# Patient Record
Sex: MALE | Race: WHITE | Employment: FULL TIME | URBAN - METROPOLITAN AREA
[De-identification: names, ages, dates, MRNs, and addresses within clinical notes are randomized per-mention and may not be internally consistent; named-entity substitution may affect disease eponyms.]

---

## 2019-04-13 ENCOUNTER — OFFICE VISIT (OUTPATIENT)
Dept: FAMILY MEDICINE CLINIC | Facility: CLINIC | Age: 49
End: 2019-04-13
Payer: COMMERCIAL

## 2019-04-13 VITALS
HEART RATE: 70 BPM | WEIGHT: 160 LBS | TEMPERATURE: 98 F | HEIGHT: 69.41 IN | SYSTOLIC BLOOD PRESSURE: 118 MMHG | DIASTOLIC BLOOD PRESSURE: 70 MMHG | BODY MASS INDEX: 23.43 KG/M2

## 2019-04-13 DIAGNOSIS — Z12.12 SCREENING FOR COLORECTAL CANCER: ICD-10-CM

## 2019-04-13 DIAGNOSIS — R06.81 APNEA: ICD-10-CM

## 2019-04-13 DIAGNOSIS — Z13.89 SCREENING FOR GENITOURINARY CONDITION: ICD-10-CM

## 2019-04-13 DIAGNOSIS — Z00.00 ROUTINE GENERAL MEDICAL EXAMINATION AT A HEALTH CARE FACILITY: Primary | ICD-10-CM

## 2019-04-13 DIAGNOSIS — R06.83 SNORING: ICD-10-CM

## 2019-04-13 DIAGNOSIS — Z12.11 SCREENING FOR COLORECTAL CANCER: ICD-10-CM

## 2019-04-13 DIAGNOSIS — Z00.00 BLOOD TESTS FOR ROUTINE GENERAL PHYSICAL EXAMINATION: ICD-10-CM

## 2019-04-13 PROCEDURE — 99386 PREV VISIT NEW AGE 40-64: CPT | Performed by: FAMILY MEDICINE

## 2019-04-13 NOTE — PROGRESS NOTES
CHIEF COMPLAINT   Leah Morris is a 57year old male who presents for a complete physical exam.   HPI:   Here for physical.  New patient. Working out twice per week. Snoring with some apnea per wife. Naps at lunch. No hx sleep study.     Wt Reading tender,FROM of the back.   EXTREMITIES: no cyanosis, clubbing or edema  NEURO: Oriented times three,cranial nerves are grossly intact,motor and sensory are grossly intact    ASSESSMENT AND PLAN:   Shadi Arnold is a 57year old male who presents for a comp

## 2019-05-24 ENCOUNTER — TELEPHONE (OUTPATIENT)
Dept: FAMILY MEDICINE CLINIC | Facility: CLINIC | Age: 49
End: 2019-05-24

## 2019-05-24 NOTE — TELEPHONE ENCOUNTER
Pt's spouse came to the office and dropped a form given by his employer in regards to his physical that he completed in April. Pt's spouse states that pt has blood work in the system and he plans on completing this weekend.     She wants to make sure kiana

## 2019-06-06 ENCOUNTER — LABORATORY ENCOUNTER (OUTPATIENT)
Dept: LAB | Age: 49
End: 2019-06-06
Attending: FAMILY MEDICINE
Payer: COMMERCIAL

## 2019-06-06 DIAGNOSIS — Z00.00 BLOOD TESTS FOR ROUTINE GENERAL PHYSICAL EXAMINATION: ICD-10-CM

## 2019-06-06 DIAGNOSIS — E78.00 ELEVATED CHOLESTEROL: ICD-10-CM

## 2019-06-06 DIAGNOSIS — Z13.89 SCREENING FOR GENITOURINARY CONDITION: ICD-10-CM

## 2019-06-06 DIAGNOSIS — D69.1 ABNORMAL PLATELETS (HCC): Primary | ICD-10-CM

## 2019-06-06 PROCEDURE — 81001 URINALYSIS AUTO W/SCOPE: CPT | Performed by: FAMILY MEDICINE

## 2019-06-06 PROCEDURE — 80050 GENERAL HEALTH PANEL: CPT | Performed by: FAMILY MEDICINE

## 2019-06-06 PROCEDURE — 36415 COLL VENOUS BLD VENIPUNCTURE: CPT | Performed by: FAMILY MEDICINE

## 2019-06-06 PROCEDURE — 84153 ASSAY OF PSA TOTAL: CPT | Performed by: FAMILY MEDICINE

## 2019-06-06 PROCEDURE — 87086 URINE CULTURE/COLONY COUNT: CPT | Performed by: FAMILY MEDICINE

## 2019-06-06 PROCEDURE — 80061 LIPID PANEL: CPT | Performed by: FAMILY MEDICINE

## 2019-06-07 ENCOUNTER — TELEPHONE (OUTPATIENT)
Dept: FAMILY MEDICINE CLINIC | Facility: CLINIC | Age: 49
End: 2019-06-07

## 2019-06-07 DIAGNOSIS — R82.90 ABNORMAL URINALYSIS: Primary | ICD-10-CM

## 2019-06-16 ENCOUNTER — HOSPITAL ENCOUNTER (EMERGENCY)
Facility: HOSPITAL | Age: 49
Discharge: HOME | End: 2019-06-16
Attending: EMERGENCY MEDICINE
Payer: COMMERCIAL

## 2019-06-16 VITALS
HEART RATE: 66 BPM | WEIGHT: 170 LBS | DIASTOLIC BLOOD PRESSURE: 82 MMHG | RESPIRATION RATE: 18 BRPM | OXYGEN SATURATION: 97 % | HEIGHT: 70.5 IN | TEMPERATURE: 97 F | SYSTOLIC BLOOD PRESSURE: 121 MMHG | BODY MASS INDEX: 24.07 KG/M2

## 2019-06-16 DIAGNOSIS — S61.212A LACERATION OF RIGHT MIDDLE FINGER WITHOUT FOREIGN BODY WITHOUT DAMAGE TO NAIL, INITIAL ENCOUNTER: Primary | ICD-10-CM

## 2019-06-16 PROCEDURE — 12002 RPR S/N/AX/GEN/TRNK2.6-7.5CM: CPT

## 2019-06-16 PROCEDURE — 99283 EMERGENCY DEPT VISIT LOW MDM: CPT

## 2019-06-16 RX ORDER — AMOXICILLIN AND CLAVULANATE POTASSIUM 875; 125 MG/1; MG/1
1 TABLET, FILM COATED ORAL 2 TIMES DAILY
Qty: 20 TABLET | Refills: 0 | Status: SHIPPED | OUTPATIENT
Start: 2019-06-16 | End: 2019-06-26

## 2019-06-16 NOTE — ED PROVIDER NOTES
Patient Seen in: [facility] Emergency Department    History   Patient presents with:  Laceration Abrasion (integumentary)    Stated Complaint: lac    HPI    Patient is a 62-year-old right-hand-dominant male presenting to the emergency department due that is superficial along the dorsum of the right middle finger no involvement of the nail plate, joints or tendons. There is a slow venous ooze, that is fairly difficult to stop bleeding. No pulsatile bleeding. No foreign body or contamination.   No mas 875-125 MG Oral Tab  Take 1 tablet by mouth 2 (two) times daily for 10 days. , Print Script, Disp-20 tablet, R-0

## 2019-06-28 ENCOUNTER — OFFICE VISIT (OUTPATIENT)
Dept: SLEEP CENTER | Facility: HOSPITAL | Age: 49
End: 2019-06-28
Attending: FAMILY MEDICINE
Payer: COMMERCIAL

## 2019-06-28 DIAGNOSIS — R06.83 SNORING: ICD-10-CM

## 2019-06-28 DIAGNOSIS — R06.81 APNEA: ICD-10-CM

## 2019-06-28 PROCEDURE — 95810 POLYSOM 6/> YRS 4/> PARAM: CPT

## 2019-07-02 NOTE — PROCEDURES
[address]       Accredited by the [facility] ([facility])    PATIENT'S NAME:        Shalini Worley  ATTENDING PHYSICIAN:   Sari Delgado M.D.   REFERRING PHYSICIAN:   Tana Bello awakenings. Sleep-disordered breathing was seen during the study; 41 apneas and hypopneas were tallied. The overall AHI was 6.5 events per hour of sleep. There was a strong supine dominance.   The supine index was 11.8 compared to a non-supine index of for your confidence in the [facility]. If you have any questions, please feel free to contact us at [phone].     Dictated By Aimee Higgins M.D.  d: 07/02/2019 09:41:48  t: 07/02/2019 09:48:26  Job [id number]  NF/    cc: Familia Kumar

## 2019-08-21 ENCOUNTER — MED REC SCAN ONLY (OUTPATIENT)
Dept: FAMILY MEDICINE CLINIC | Facility: CLINIC | Age: 49
End: 2019-08-21

## 2019-10-30 ENCOUNTER — MED REC SCAN ONLY (OUTPATIENT)
Dept: FAMILY MEDICINE CLINIC | Facility: CLINIC | Age: 49
End: 2019-10-30

## 2020-03-20 ENCOUNTER — OFFICE VISIT (OUTPATIENT)
Dept: FAMILY MEDICINE CLINIC | Facility: CLINIC | Age: 50
End: 2020-03-20
Payer: COMMERCIAL

## 2020-03-20 VITALS
DIASTOLIC BLOOD PRESSURE: 80 MMHG | SYSTOLIC BLOOD PRESSURE: 130 MMHG | RESPIRATION RATE: 12 BRPM | BODY MASS INDEX: 23.5 KG/M2 | WEIGHT: 166 LBS | HEIGHT: 70.5 IN | HEART RATE: 72 BPM | TEMPERATURE: 97 F

## 2020-03-20 DIAGNOSIS — G89.29 CHRONIC BILATERAL LOW BACK PAIN WITH BILATERAL SCIATICA: Primary | ICD-10-CM

## 2020-03-20 DIAGNOSIS — M54.41 CHRONIC BILATERAL LOW BACK PAIN WITH BILATERAL SCIATICA: Primary | ICD-10-CM

## 2020-03-20 DIAGNOSIS — M54.42 CHRONIC BILATERAL LOW BACK PAIN WITH BILATERAL SCIATICA: Primary | ICD-10-CM

## 2020-03-20 PROCEDURE — 99214 OFFICE O/P EST MOD 30 MIN: CPT | Performed by: FAMILY MEDICINE

## 2020-03-20 RX ORDER — MELOXICAM 15 MG/1
15 TABLET ORAL DAILY
Qty: 90 TABLET | Refills: 0 | Status: ON HOLD | OUTPATIENT
Start: 2020-03-20 | End: 2020-06-03

## 2020-03-20 NOTE — PROGRESS NOTES
Leah Morris is a 54year old male. CHIEF COMPLAINT   Low back pain  HPI:   About this time last year, was carrying something heavy and felt pain and pop in back. Lasted for awhile and then slowly got better.   Since then has had consistent pain in both (primary encounter diagnosis)    Meds & Refills for this Visit:  Requested Prescriptions     Signed Prescriptions Disp Refills   • Meloxicam 15 MG Oral Tab 90 tablet 0     Sig: Take 1 tablet (15 mg total) by mouth daily.        Imaging & Consults:  MRI SPIN

## 2020-03-25 ENCOUNTER — TELEPHONE (OUTPATIENT)
Dept: FAMILY MEDICINE CLINIC | Facility: CLINIC | Age: 50
End: 2020-03-25

## 2020-03-25 NOTE — TELEPHONE ENCOUNTER
Shanda Holloway  P Emg 11 Front Office             Good afternoon! According to [facility]:     All information pertaining to your request was received and reviewed by a Medical Director.      Reason:   Based on [employer] [facility] Guidelines Secti

## 2020-03-26 NOTE — TELEPHONE ENCOUNTER
MRI being denied. He had PT but because it was more than 3 months ago, it appears insurance won't approve. Please call [facility] PT to see if they are even doing PT right now.   If they aren't doing PT currently due to Covid, I could try him on a prescriptio

## 2020-03-27 NOTE — TELEPHONE ENCOUNTER
Sandie from [place] transferred to me on this. Reports pt called them to inquire and they did not even show record of the MRI order? ?  I advised the rep to contact our referral dept as they are the ones that process any authorizations and offered to s/w pt d

## 2020-03-27 NOTE — TELEPHONE ENCOUNTER
If he takes the meloxicam for 6 weeks, then calls us back with an update (appears it can be by phone rather than an in person visit) and isn't better, I think that we could get insurance to approve.   If they have a high deductible plan, it may be better to

## 2020-03-27 NOTE — TELEPHONE ENCOUNTER
Informed pt's wife, of new instructions, voices understanding. Sophie,pt's wife states insurance will be calling to speak with you about a peer to peer review with Willy.   She wanted you to be prepared to speak with them and mention maybe therapeutic

## 2020-03-27 NOTE — TELEPHONE ENCOUNTER
I am out of the office until Wednesday. Just FYI in the event that they try to schedule a peer to peer before that.

## 2020-03-30 NOTE — TELEPHONE ENCOUNTER
Spoke with Amanda Kitchen at [facility]. [id number] with Dr. Dhiraj Mccoy is set for Wednesday, Apirl 1 at 8:30 am with Adrianna Segura. Dr. Juancarlos London will call our office and ask for [place].

## 2020-03-30 NOTE — TELEPHONE ENCOUNTER
Noted. Copy to lena/PSR/referrals and updated dr stack/on call. He suggests we call shlomo and schedule peer to peer with donte PLATT for 4/1/wednesday, when she returns to office. Rubio Bowens updated-will contact shlomo to schedule.

## 2020-04-01 NOTE — TELEPHONE ENCOUNTER
Called and spoke to Dr. Chani Rivers. He advised that needs to have 6 weeks of provider directed conservative therapy  - anti inflammatory would meet this requirement after which time the patient will need to call us with an update on his symptoms.   If will with

## 2020-04-01 NOTE — TELEPHONE ENCOUNTER
Call to pt's cell reaches identified voice mail. Left vmm req call back to triage nurse tomorrow for outcome of donte's peer to peer review w  from his [employer] today. Provided ofc phone hours.

## 2020-04-03 NOTE — TELEPHONE ENCOUNTER
Call to pt-advised of donte PLATT info below from peer to peer review. Pt confirms started meloxicam 3/20/20 so will call update 5/1-will call sooner is any new/worsening symptoms.   Updates that since starting meloxicam 3/20/2020 for short time after dose

## 2020-05-04 ENCOUNTER — PATIENT MESSAGE (OUTPATIENT)
Dept: FAMILY MEDICINE CLINIC | Facility: CLINIC | Age: 50
End: 2020-05-04

## 2020-05-04 NOTE — TELEPHONE ENCOUNTER
Please send the patient contact information for Dr. Sharri Barillas (pain management) and Dr. Megan Bernstein (physiatry - physical medicine/rehab). I'm not sure which of these are currently seeing new patients during Covid but he can call to check.

## 2020-06-01 ENCOUNTER — APPOINTMENT (OUTPATIENT)
Dept: MRI IMAGING | Facility: HOSPITAL | Age: 50
End: 2020-06-01
Attending: EMERGENCY MEDICINE
Payer: COMMERCIAL

## 2020-06-01 ENCOUNTER — HOSPITAL ENCOUNTER (OUTPATIENT)
Facility: HOSPITAL | Age: 50
Setting detail: OBSERVATION
Discharge: HOME | End: 2020-06-03
Attending: EMERGENCY MEDICINE | Admitting: INTERNAL MEDICINE
Payer: COMMERCIAL

## 2020-06-01 DIAGNOSIS — M54.16 LUMBAR RADICULITIS: ICD-10-CM

## 2020-06-01 DIAGNOSIS — M54.16 LUMBAR RADICULOPATHY: Primary | ICD-10-CM

## 2020-06-01 PROBLEM — M51.16 LUMBAR DISC HERNIATION WITH RADICULOPATHY: Status: ACTIVE | Noted: 2020-06-01

## 2020-06-01 PROCEDURE — 72148 MRI LUMBAR SPINE W/O DYE: CPT | Performed by: EMERGENCY MEDICINE

## 2020-06-01 PROCEDURE — 99220 INITIAL OBSERVATION CARE,LEVL III: CPT | Performed by: INTERNAL MEDICINE

## 2020-06-01 RX ORDER — ACETAMINOPHEN 325 MG/1
650 TABLET ORAL EVERY 4 HOURS PRN
Status: DISCONTINUED | OUTPATIENT
Start: 2020-06-01 | End: 2020-06-02

## 2020-06-01 RX ORDER — ONDANSETRON 2 MG/ML
4 INJECTION INTRAMUSCULAR; INTRAVENOUS EVERY 6 HOURS PRN
Status: DISCONTINUED | OUTPATIENT
Start: 2020-06-01 | End: 2020-06-02

## 2020-06-01 RX ORDER — HYDROCODONE BITARTRATE AND ACETAMINOPHEN 5; 325 MG/1; MG/1
1 TABLET ORAL EVERY 4 HOURS PRN
Status: DISCONTINUED | OUTPATIENT
Start: 2020-06-01 | End: 2020-06-02

## 2020-06-01 RX ORDER — DEXAMETHASONE SODIUM PHOSPHATE 4 MG/ML
4 VIAL (ML) INJECTION EVERY 6 HOURS
Status: DISCONTINUED | OUTPATIENT
Start: 2020-06-02 | End: 2020-06-03

## 2020-06-01 RX ORDER — HYDROMORPHONE HYDROCHLORIDE 1 MG/ML
0.8 INJECTION, SOLUTION INTRAMUSCULAR; INTRAVENOUS; SUBCUTANEOUS EVERY 2 HOUR PRN
Status: DISCONTINUED | OUTPATIENT
Start: 2020-06-01 | End: 2020-06-02

## 2020-06-01 RX ORDER — ONDANSETRON 2 MG/ML
4 INJECTION INTRAMUSCULAR; INTRAVENOUS ONCE
Status: COMPLETED | OUTPATIENT
Start: 2020-06-01 | End: 2020-06-01

## 2020-06-01 RX ORDER — SODIUM CHLORIDE 9 MG/ML
INJECTION, SOLUTION INTRAVENOUS CONTINUOUS
Status: DISCONTINUED | OUTPATIENT
Start: 2020-06-02 | End: 2020-06-03

## 2020-06-01 RX ORDER — HYDROMORPHONE HYDROCHLORIDE 1 MG/ML
0.4 INJECTION, SOLUTION INTRAMUSCULAR; INTRAVENOUS; SUBCUTANEOUS EVERY 2 HOUR PRN
Status: DISCONTINUED | OUTPATIENT
Start: 2020-06-01 | End: 2020-06-02

## 2020-06-01 RX ORDER — MORPHINE SULFATE 4 MG/ML
4 INJECTION, SOLUTION INTRAMUSCULAR; INTRAVENOUS EVERY 30 MIN PRN
Status: DISCONTINUED | OUTPATIENT
Start: 2020-06-01 | End: 2020-06-02

## 2020-06-01 RX ORDER — HYDROMORPHONE HYDROCHLORIDE 1 MG/ML
1 INJECTION, SOLUTION INTRAMUSCULAR; INTRAVENOUS; SUBCUTANEOUS EVERY 30 MIN PRN
Status: DISCONTINUED | OUTPATIENT
Start: 2020-06-01 | End: 2020-06-01

## 2020-06-01 RX ORDER — HYDROCODONE BITARTRATE AND ACETAMINOPHEN 5; 325 MG/1; MG/1
2 TABLET ORAL EVERY 4 HOURS PRN
Status: DISCONTINUED | OUTPATIENT
Start: 2020-06-01 | End: 2020-06-02

## 2020-06-01 RX ORDER — HYDROMORPHONE HYDROCHLORIDE 1 MG/ML
0.2 INJECTION, SOLUTION INTRAMUSCULAR; INTRAVENOUS; SUBCUTANEOUS EVERY 2 HOUR PRN
Status: DISCONTINUED | OUTPATIENT
Start: 2020-06-01 | End: 2020-06-02

## 2020-06-01 NOTE — ED PROVIDER NOTES
Patient Seen in: [facility] Emergency Department      History   Patient presents with:  Back Pain    Stated Complaint: back pain    HPI    this is a pleasant 53-year-old male coming complaints of back pain.   Patient says he was getting up from a sea palpation skin was slightly diaphoretic no focal deficits on neurologic exam.       ED Course     Labs Reviewed   CBC W/ DIFFERENTIAL - Abnormal; Notable for the following components:       Result Value    WBC 16.5 (*)     .0 (*)     Neutrophil Abso

## 2020-06-01 NOTE — ED INITIAL ASSESSMENT (HPI)
Pt states has chronic back pain and sciatica x months states he squatted today and felt a \"pop\"lower left back and felt pain and numbness down to leg, awaiting approval for MRI

## 2020-06-02 ENCOUNTER — TELEPHONE (OUTPATIENT)
Dept: SURGERY | Facility: CLINIC | Age: 50
End: 2020-06-02

## 2020-06-02 PROCEDURE — 99243 OFF/OP CNSLTJ NEW/EST LOW 30: CPT | Performed by: ANESTHESIOLOGY

## 2020-06-02 PROCEDURE — 99225 SUBSEQUENT OBSERVATION CARE: CPT | Performed by: HOSPITALIST

## 2020-06-02 RX ORDER — NAPROXEN 250 MG/1
250 TABLET ORAL 2 TIMES DAILY WITH MEALS
Status: ON HOLD | COMMUNITY
End: 2020-06-03

## 2020-06-02 RX ORDER — MORPHINE SULFATE 4 MG/ML
2 INJECTION, SOLUTION INTRAMUSCULAR; INTRAVENOUS EVERY 2 HOUR PRN
Status: DISCONTINUED | OUTPATIENT
Start: 2020-06-02 | End: 2020-06-02

## 2020-06-02 RX ORDER — HYDROMORPHONE HYDROCHLORIDE 1 MG/ML
0.5 INJECTION, SOLUTION INTRAMUSCULAR; INTRAVENOUS; SUBCUTANEOUS EVERY 2 HOUR PRN
Status: DISCONTINUED | OUTPATIENT
Start: 2020-06-02 | End: 2020-06-02

## 2020-06-02 RX ORDER — HYDROMORPHONE HYDROCHLORIDE 1 MG/ML
1 INJECTION, SOLUTION INTRAMUSCULAR; INTRAVENOUS; SUBCUTANEOUS EVERY 2 HOUR PRN
Status: DISCONTINUED | OUTPATIENT
Start: 2020-06-02 | End: 2020-06-02

## 2020-06-02 RX ORDER — IBUPROFEN 200 MG
400 TABLET ORAL EVERY 6 HOURS PRN
Status: ON HOLD | COMMUNITY
End: 2020-06-03

## 2020-06-02 RX ORDER — GABAPENTIN 300 MG/1
300 CAPSULE ORAL 3 TIMES DAILY
Status: DISCONTINUED | OUTPATIENT
Start: 2020-06-02 | End: 2020-06-03

## 2020-06-02 RX ORDER — KETOROLAC TROMETHAMINE 30 MG/ML
30 INJECTION, SOLUTION INTRAMUSCULAR; INTRAVENOUS EVERY 6 HOURS PRN
Status: DISCONTINUED | OUTPATIENT
Start: 2020-06-02 | End: 2020-06-02

## 2020-06-02 RX ORDER — HYDROCODONE BITARTRATE AND ACETAMINOPHEN 10; 325 MG/1; MG/1
1 TABLET ORAL EVERY 4 HOURS PRN
Status: DISCONTINUED | OUTPATIENT
Start: 2020-06-02 | End: 2020-06-03

## 2020-06-02 RX ORDER — DOCUSATE SODIUM 100 MG/1
100 CAPSULE, LIQUID FILLED ORAL 2 TIMES DAILY
Status: DISCONTINUED | OUTPATIENT
Start: 2020-06-02 | End: 2020-06-03

## 2020-06-02 RX ORDER — MORPHINE SULFATE 4 MG/ML
4 INJECTION, SOLUTION INTRAMUSCULAR; INTRAVENOUS EVERY 2 HOUR PRN
Status: DISCONTINUED | OUTPATIENT
Start: 2020-06-02 | End: 2020-06-02

## 2020-06-02 RX ORDER — ONDANSETRON 2 MG/ML
4 INJECTION INTRAMUSCULAR; INTRAVENOUS EVERY 4 HOURS PRN
Status: DISCONTINUED | OUTPATIENT
Start: 2020-06-02 | End: 2020-06-03

## 2020-06-02 RX ORDER — MORPHINE SULFATE 4 MG/ML
1 INJECTION, SOLUTION INTRAMUSCULAR; INTRAVENOUS EVERY 2 HOUR PRN
Status: DISCONTINUED | OUTPATIENT
Start: 2020-06-02 | End: 2020-06-03

## 2020-06-02 RX ORDER — ACETAMINOPHEN 325 MG/1
325 TABLET ORAL EVERY 6 HOURS PRN
Status: ON HOLD | COMMUNITY
End: 2020-06-03

## 2020-06-02 RX ORDER — MORPHINE SULFATE 4 MG/ML
2 INJECTION, SOLUTION INTRAMUSCULAR; INTRAVENOUS EVERY 2 HOUR PRN
Status: DISCONTINUED | OUTPATIENT
Start: 2020-06-02 | End: 2020-06-03

## 2020-06-02 RX ORDER — CYCLOBENZAPRINE HCL 5 MG
5 TABLET ORAL 3 TIMES DAILY
Status: DISCONTINUED | OUTPATIENT
Start: 2020-06-02 | End: 2020-06-03

## 2020-06-02 RX ORDER — HYDROCODONE BITARTRATE AND ACETAMINOPHEN 10; 325 MG/1; MG/1
2 TABLET ORAL EVERY 4 HOURS PRN
Status: DISCONTINUED | OUTPATIENT
Start: 2020-06-02 | End: 2020-06-03

## 2020-06-02 RX ORDER — KETOROLAC TROMETHAMINE 15 MG/ML
15 INJECTION, SOLUTION INTRAMUSCULAR; INTRAVENOUS EVERY 6 HOURS PRN
Status: DISCONTINUED | OUTPATIENT
Start: 2020-06-02 | End: 2020-06-02

## 2020-06-02 RX ORDER — MORPHINE SULFATE 4 MG/ML
4 INJECTION, SOLUTION INTRAMUSCULAR; INTRAVENOUS EVERY 2 HOUR PRN
Status: DISCONTINUED | OUTPATIENT
Start: 2020-06-02 | End: 2020-06-03

## 2020-06-02 RX ORDER — HYDROMORPHONE HYDROCHLORIDE 1 MG/ML
0.8 INJECTION, SOLUTION INTRAMUSCULAR; INTRAVENOUS; SUBCUTANEOUS EVERY 2 HOUR PRN
Status: DISCONTINUED | OUTPATIENT
Start: 2020-06-02 | End: 2020-06-02

## 2020-06-02 RX ORDER — MORPHINE SULFATE 4 MG/ML
1 INJECTION, SOLUTION INTRAMUSCULAR; INTRAVENOUS EVERY 2 HOUR PRN
Status: DISCONTINUED | OUTPATIENT
Start: 2020-06-02 | End: 2020-06-02

## 2020-06-02 NOTE — PHYSICAL THERAPY NOTE
PT order received for an eval, however pt has significant MRI results and awaiting ortho sx consult. Will assess when medically cleared for activity.

## 2020-06-02 NOTE — PLAN OF CARE
Pt is A&O x4. VSS and afebrile. Sinus rhythm on tele, rate in 70s. O2 sats WNL on room air, lung sounds clear bilaterally.  maintained. C/o left lower back pain rated about 5-6 on 0 to 10 scale. Providing pain meds PRN per [facility] with good relief.  Reports pain and request assistance  - Assess pain using appropriate pain scale  - Administer analgesics based on type and severity of pain and evaluate response  - Implement non-pharmacological measures as appropriate and evaluate response  - Consider cultural an

## 2020-06-02 NOTE — PLAN OF CARE
NURSING ADMISSION NOTE      Patient admitted via Cart  Oriented to room. Safety precautions initiated. Bed in low position. Call light in reach. Pt arrived to unit in stable condition from ED at 2345. A&O x4. VSS and afebrile.  Unable to [place]

## 2020-06-02 NOTE — CONSULTS
Name: Kenney Jiang   : 1970   DOS: 2020     Chief complaint: Low back pain    History of present illness: Kenney Jiang is a 54year old male with a history of low back pain, admitted for acute lumbar radiculopathy.   The patient was wor The patient ambulates with normal gait. HEENT: No gross lesion noted. PEERL. No icterus. Neck and Upper Extremity: Supple. No thyromegaly or lymphadenopathy. Severe tenderness over the cervical paravertebral and trapezius muscles.  Flexion of the neck pauline left lumbar 4–5 transforaminal epidural steroid injection. The risk and benefits were discussed in detail and all questions addressed. Patient will be added on to Dr. Lauralyn Spatz schedule in the afternoon tomorrow.   He can follow-up as an outpatient after epi

## 2020-06-02 NOTE — PROGRESS NOTES
[facility] H[facility]PITAL[facility]  Progress Note     Lorejuma Lazoquincy Patient Status:  Observation    1970 MRN [id number]   [facility] 3NW-A Attending Sarah Hernandez MD   Hosp Day # 0 PCP Pankaj Garcia MD     Chief Complaint: Back pain    S: Pa reviewed in Epic.     Medications:   • gabapentin  300 mg Oral TID   • cyclobenzaprine  5 mg Oral TID   • docusate sodium  100 mg Oral BID   • lidocaine-menthol  1 patch Transdermal Daily   • dexamethasone Sodium Phosphate  4 mg Intravenous Q6H       ASSESS

## 2020-06-02 NOTE — PROGRESS NOTES
Pain Service  Spoke with RN  Notified [facility] regarding consult for left L4 SNRB   Patient received Toradol at [facility] 5 am  Deric Quiroz RN

## 2020-06-02 NOTE — CONSULTS
Patient: Stephan Griffith  Medical Record Number: [id number]  Referring Physician:  No ref.  provider found  PCP: Azar Vega MD      HISTORY OF CHIEF COMPLAINT:    Stephan Griffith is a 54year old male, who comes to ED for worsening back and lef A comprehensive 10 point review of systems was completed. Pertinent positives and negatives noted in the the HPI.      PHYSICAL EXAMIMATION   PHYSICAL EXAMINATION: Shaquille Flores is a 54year old male who is observed sitting in the exam room alert and or MEDICAL DECISION MAKING:     Plan:   [unfilled]      Stephan Nacho is a 54year old with L4 radiculopathy with elements of L5 and S1 also.  L4 hes most significant p

## 2020-06-02 NOTE — H&P (VIEW-ONLY)
Name: Imelda Borrego   : 1970   DOS: 2020     Chief complaint: Low back pain    History of present illness: Imelda Borrego is a 54year old male with a history of low back pain, admitted for acute lumbar radiculopathy.   The patient was wor The patient ambulates with normal gait. HEENT: No gross lesion noted. PEERL. No icterus. Neck and Upper Extremity: Supple. No thyromegaly or lymphadenopathy. Severe tenderness over the cervical paravertebral and trapezius muscles.  Flexion of the neck pauline left lumbar 4–5 transforaminal epidural steroid injection. The risk and benefits were discussed in detail and all questions addressed. Patient will be added on to Dr. Kaylan Almonte schedule in the afternoon tomorrow.   He can follow-up as an outpatient after epi

## 2020-06-02 NOTE — ED NOTES
Attempted to walk patient per MD request. Pt able to get up and Elwyn Guitar slowly to door of room. Pt stated with every step  Pain became worse. Returned back to bed. MD Soha Naranjo notified.

## 2020-06-02 NOTE — H&P
[facility]                                                               History & Physical         Gali Armas Patient Status:  Emergency    1970 MRN [id number]   Location [facility] Attending Tricia Lin lymphadenopathy. No JVD. No carotid bruits. Respiratory: Clear to auscultation bilaterally. No wheezes. No rhonchi. Cardiovascular: S1, S2.  Regular rate and rhythm. No murmurs. Equal pulses   Abdomen: Soft, nontender, nondistended.   Positive bowel so normal 12 mm. L5-S1:  Minimal degenerative disc bulge without significant central or foraminal stenosis. The facet joints unremarkable. PARASPINAL AREA:  Normal with no visible mass.     BONY STRUCTURES:  No fracture, pars defect, significant scolios

## 2020-06-02 NOTE — PLAN OF CARE
Patient is alert and oriented x3  Up to dangle only. Patient is not able to ambulate to bathroom d/t back pain. Uses urinal at bedside.  Neuro checks WNL  Voids in urinal  Patient reports he is not having spikes in pain like he was at admission but still ha Notify MD/LIP if interventions unsuccessful or patient reports new pain  - Anticipate increased pain with activity and pre-medicate as appropriate  Outcome: Progressing

## 2020-06-03 ENCOUNTER — TELEPHONE (OUTPATIENT)
Dept: PAIN CLINIC | Facility: CLINIC | Age: 50
End: 2020-06-03

## 2020-06-03 ENCOUNTER — APPOINTMENT (OUTPATIENT)
Dept: GENERAL RADIOLOGY | Facility: HOSPITAL | Age: 50
End: 2020-06-03
Attending: ANESTHESIOLOGY
Payer: COMMERCIAL

## 2020-06-03 VITALS
RESPIRATION RATE: 15 BRPM | TEMPERATURE: 98 F | DIASTOLIC BLOOD PRESSURE: 90 MMHG | SYSTOLIC BLOOD PRESSURE: 115 MMHG | OXYGEN SATURATION: 98 % | HEART RATE: 85 BPM | BODY MASS INDEX: 22.9 KG/M2 | WEIGHT: 160 LBS | HEIGHT: 70 IN

## 2020-06-03 DIAGNOSIS — M54.16 LUMBAR RADICULITIS: Primary | ICD-10-CM

## 2020-06-03 PROCEDURE — 99217 OBSERVATION CARE DISCHARGE: CPT | Performed by: HOSPITALIST

## 2020-06-03 PROCEDURE — 3E0R3BZ INTRODUCTION OF ANESTHETIC AGENT INTO SPINAL CANAL, PERCUTANEOUS APPROACH: ICD-10-PCS | Performed by: ANESTHESIOLOGY

## 2020-06-03 PROCEDURE — 3E0R33Z INTRODUCTION OF ANTI-INFLAMMATORY INTO SPINAL CANAL, PERCUTANEOUS APPROACH: ICD-10-PCS | Performed by: ANESTHESIOLOGY

## 2020-06-03 RX ORDER — ONDANSETRON 2 MG/ML
4 INJECTION INTRAMUSCULAR; INTRAVENOUS ONCE AS NEEDED
Status: DISCONTINUED | OUTPATIENT
Start: 2020-06-03 | End: 2020-06-03

## 2020-06-03 RX ORDER — SODIUM CHLORIDE, SODIUM LACTATE, POTASSIUM CHLORIDE, CALCIUM CHLORIDE 600; 310; 30; 20 MG/100ML; MG/100ML; MG/100ML; MG/100ML
100 INJECTION, SOLUTION INTRAVENOUS CONTINUOUS
Status: DISCONTINUED | OUTPATIENT
Start: 2020-06-03 | End: 2020-06-03

## 2020-06-03 RX ORDER — ONDANSETRON 2 MG/ML
4 INJECTION INTRAMUSCULAR; INTRAVENOUS ONCE AS NEEDED
Status: DISCONTINUED | OUTPATIENT
Start: 2020-06-03 | End: 2020-06-03 | Stop reason: HOSPADM

## 2020-06-03 RX ORDER — HYDROCODONE BITARTRATE AND ACETAMINOPHEN 10; 325 MG/1; MG/1
1 TABLET ORAL EVERY 4 HOURS PRN
Qty: 30 TABLET | Refills: 0 | Status: SHIPPED | OUTPATIENT
Start: 2020-06-03 | End: 2020-08-27 | Stop reason: ALTCHOICE

## 2020-06-03 RX ORDER — METHYLPREDNISOLONE 4 MG/1
TABLET ORAL
Qty: 21 TABLET | Refills: 0 | Status: SHIPPED | OUTPATIENT
Start: 2020-06-03 | End: 2020-06-15 | Stop reason: ALTCHOICE

## 2020-06-03 RX ORDER — DEXAMETHASONE SODIUM PHOSPHATE 10 MG/ML
INJECTION, SOLUTION INTRAMUSCULAR; INTRAVENOUS AS NEEDED
Status: DISCONTINUED | OUTPATIENT
Start: 2020-06-03 | End: 2020-06-03 | Stop reason: HOSPADM

## 2020-06-03 RX ORDER — LIDOCAINE HYDROCHLORIDE 10 MG/ML
INJECTION, SOLUTION EPIDURAL; INFILTRATION; INTRACAUDAL; PERINEURAL AS NEEDED
Status: DISCONTINUED | OUTPATIENT
Start: 2020-06-03 | End: 2020-06-03 | Stop reason: HOSPADM

## 2020-06-03 RX ORDER — PSEUDOEPHEDRINE HCL 30 MG
100 TABLET ORAL 2 TIMES DAILY
Qty: 60 CAPSULE | Refills: 0 | Status: SHIPPED | OUTPATIENT
Start: 2020-06-03 | End: 2020-08-27 | Stop reason: ALTCHOICE

## 2020-06-03 RX ORDER — GABAPENTIN 300 MG/1
300 CAPSULE ORAL 3 TIMES DAILY
Qty: 90 CAPSULE | Refills: 0 | Status: SHIPPED | OUTPATIENT
Start: 2020-06-03 | End: 2020-07-10

## 2020-06-03 RX ORDER — DIPHENHYDRAMINE HYDROCHLORIDE 50 MG/ML
50 INJECTION INTRAMUSCULAR; INTRAVENOUS ONCE AS NEEDED
Status: DISCONTINUED | OUTPATIENT
Start: 2020-06-03 | End: 2020-06-03

## 2020-06-03 RX ORDER — CYCLOBENZAPRINE HCL 5 MG
5 TABLET ORAL 3 TIMES DAILY PRN
Qty: 30 TABLET | Refills: 0 | Status: SHIPPED | OUTPATIENT
Start: 2020-06-03 | End: 2020-06-15 | Stop reason: ALTCHOICE

## 2020-06-03 NOTE — INTERVAL H&P NOTE
Pre-op Diagnosis: Lumbar radiculitis [M54.16]    The above referenced H&P was reviewed by El Jones MD on 6/3/2020, the patient was examined and no significant changes have occurred in the patient's condition since the H&P was performed.   I discusse

## 2020-06-03 NOTE — PLAN OF CARE
Patient states his pain is mild when he is in bed and resting ,pain is severe when he try to stand on his feet ,most of the pain is in the left leg ,also has numbness and tingling to left leg from calf down to feet and toes ,pain is controlled with norco s

## 2020-06-03 NOTE — PROGRESS NOTES
Chart reviewed  Per spine note, request for selective L4 nerve root block only to delineate symptoms L5 vs S1. I was present for discussion between pain service and patient yesterday.     Initially nerve root block discussed by pain service, but patient

## 2020-06-03 NOTE — DISCHARGE SUMMARY
[facility]  DISCHARGE SUMMARY     Alton Bautista Patient Status:  Observation    1970 MRN [id number]   [facility] 3NW-A Attending No att. providers found   Hosp Day # 0 PCP Muna Salcedo MD     Date of Admission:  Recommendation:  LACE < 29: Low Risk of readmission after discharge from the hospital; Still recommend for TCM follow-up. Procedures during hospitalization:   Left L4 selective nerve root block via L4-L5 transforaminal approach under fluoroscopy.     Dis Ptch  · methylPREDNISolone 4 MG Tbpk         [place]    Follow-up appointment:   Tim Kessler MD   [facility] [id number]    In 2 weeks  As directed    Marshall Parks MD  [facility] [address]

## 2020-06-03 NOTE — OPERATIVE REPORT
[facility]  Operative Report  6/3/2020     Kentrell Bell Patient Status:  Observation    1970 MRN [id number]   [facility] 3NW-A Attending Wade Whitlock MD   Hosp Day # 0 PCP Petra Suárez MD     Indication: Vineet Bullockon is a 4 fluoroscopic guidance. The needle was advanced into the anterior epidural space at this level. The needle position was confirmed under AP and lateral fluoroscopic view.   Following negative aspiration for CSF and blood, approximately 1 cc of Omnipaque 240 w

## 2020-06-03 NOTE — PHYSICAL THERAPY NOTE
PHYSICAL THERAPY QUICK EVALUATION - INPATIENT    Room Number: 303/303-A  Evaluation Date: 6/3/2020  Presenting Problem: (lumbar radiculopathy )  Physician Order: PT Eval and Treat    Problem List  Principal Problem:    Lumbar radiculopathy  Active Proble O2 WALK                  AM-PAC '6-Clicks' INPATIENT SHORT FORM - BASIC MOBILITY  How much difficulty does the patient currently have. ..  -   Turning over in bed (including adjusting bedclothes, sheets and blankets)?: None   -   Sitting down on and low level flossing to help to mobilize sciatic nerve . Recommended OP PT for continued strength and endurance, core stab . Patient End of Session: Up in chair;Needs met;Call light within reach;RN aware of session/findings;Bracing education provided; All p

## 2020-06-08 ENCOUNTER — OFFICE VISIT (OUTPATIENT)
Dept: FAMILY MEDICINE CLINIC | Facility: CLINIC | Age: 50
End: 2020-06-08
Payer: COMMERCIAL

## 2020-06-08 VITALS
HEART RATE: 72 BPM | DIASTOLIC BLOOD PRESSURE: 60 MMHG | BODY MASS INDEX: 23.77 KG/M2 | WEIGHT: 166 LBS | HEIGHT: 70 IN | TEMPERATURE: 98 F | RESPIRATION RATE: 18 BRPM | SYSTOLIC BLOOD PRESSURE: 96 MMHG

## 2020-06-08 DIAGNOSIS — M51.26 LUMBAR DISC HERNIATION: Primary | ICD-10-CM

## 2020-06-08 DIAGNOSIS — R20.0 LEFT LEG NUMBNESS: ICD-10-CM

## 2020-06-08 DIAGNOSIS — R29.898 LEFT LEG WEAKNESS: ICD-10-CM

## 2020-06-08 PROBLEM — J30.9 ALLERGIC RHINITIS: Status: ACTIVE | Noted: 2020-06-08

## 2020-06-08 PROCEDURE — 99214 OFFICE O/P EST MOD 30 MIN: CPT | Performed by: FAMILY MEDICINE

## 2020-06-08 PROCEDURE — 1111F DSCHRG MED/CURRENT MED MERGE: CPT | Performed by: FAMILY MEDICINE

## 2020-06-08 NOTE — PROGRESS NOTES
Ronda Kramer is a 54year old male. CHIEF COMPLAINT   Follow up lumbar disc herniation  HPI:   Admitted for about 36 hours for low back pain/left leg weakness- had injection in the hospital.  Pain level 1-2 in left lower lateral leg.   Worse with walki RRR without murmur  NEURO:  DTRs to bilateral lowe ext normal.  Patient unable to walk on left heel. Left sided weakness with dorsiflexion of foot. Also some weakness with knee flexion.       MRI report:  PATIENT STATED HISTORY: (As transcribed by Shayne Roche

## 2020-06-25 ENCOUNTER — TELEPHONE (OUTPATIENT)
Dept: PAIN CLINIC | Facility: CLINIC | Age: 50
End: 2020-06-25

## 2020-06-25 ENCOUNTER — OFFICE VISIT (OUTPATIENT)
Dept: PAIN CLINIC | Facility: CLINIC | Age: 50
End: 2020-06-25
Payer: COMMERCIAL

## 2020-06-25 VITALS — WEIGHT: 160 LBS | HEIGHT: 70 IN | BODY MASS INDEX: 22.9 KG/M2

## 2020-06-25 DIAGNOSIS — M54.16 LUMBAR RADICULAR PAIN: Primary | ICD-10-CM

## 2020-06-25 PROCEDURE — 99214 OFFICE O/P EST MOD 30 MIN: CPT | Performed by: ANESTHESIOLOGY

## 2020-06-25 NOTE — TELEPHONE ENCOUNTER
Medical clearance needed- no  Implanted cardiac device/SCS/PNS: n/a    Pt seen in OV today by Dr. Yasmin Chicas and recommended for LESI (X 1) with Dr. Eve Stahl. Please begin PA process for procedure(s). Laterality: n/a  Level(s): n/a    Pt informed callback will be given when PA feedback received and procedure date to be scheduled after approval.  All procedural instructions reviewed, procedure line number provided. Pt verbalized understanding and agreeable to plan. Copy of instructions provided.

## 2020-06-25 NOTE — PROGRESS NOTES
Name: Alka Castillo   : 1970   DOS: 2020     Pain Clinic Follow Up Visit:   Alka Castillo is a 54year old male who presents for recheck of his low back pain and left lower extremity pain.   He is status post left L4 selective nerve root compression, Yeoman's and Gaenslen's tests are positive bilaterally. Flexion of the spine makes the pain better, extension and lateral rotation of the spine makes the pain worse.  Straight leg raising test is positive at 15 degrees on the left side and nega

## 2020-06-25 NOTE — PROGRESS NOTES
Last procedure: LEFT L4 SELECTIVE NERVE ROOT BLOCK VIA L4-L5 TRANSFORAMINAL APPROACH UNDER FLUOROSCOPY WITHOUT SEDATION.   Date: 06/03/2020    Percentage of relief obtained: 70%    Duration of relief: patient still at 70%     Patient presents in office toda

## 2020-06-30 NOTE — TELEPHONE ENCOUNTER
BREANA ([id number]) Denied   Case Number: [id number]     Denial Reason:    Reason: Based on [facility] Comprehensive Musculoskeletal Management Guideline  - Epidural Steroid Injections (DIPIKA) (used for pain that travels from your spine to your arms or legs), we cannot approve this request.   Your records do not show that you have failed a trial of at least 6 weeks of treatment given by your doctor. This treatment may include: exercise, therapy, chiropractic care, and/or drugs for swelling and/or muscle relaxers. The requested service is not supported prior to a trial of treatment for at least 6 weeks. Your physician may contact [facility] at [phone] to discuss this case with a physician advisor.  This call may be made prior to the submission of an appeal.

## 2020-07-02 NOTE — TELEPHONE ENCOUNTER
Peer-Peer scheduled for Monday 7/6 at 9:30am with Dr. Surinder Hadley at \[facility]\"".

## 2020-07-06 NOTE — TELEPHONE ENCOUNTER
Pt calling to schedule injections, please call back, pt states \"this needs to be expedited and he will be out of town 07/12-07/23\"

## 2020-07-07 ENCOUNTER — TELEPHONE (OUTPATIENT)
Dept: PAIN CLINIC | Facility: CLINIC | Age: 50
End: 2020-07-07

## 2020-07-07 DIAGNOSIS — M54.16 LUMBAR RADICULITIS: Primary | ICD-10-CM

## 2020-07-07 NOTE — TELEPHONE ENCOUNTER
Peer to Peer Authorized     Prior authorization request completed for: Brittney Lockhart #[id number]     Authorization dates: 07/06/20 - 09/04/20  CPT codes approved: 62133  Number of visits/dates of service approved: 1  Physician: Javy Whitehead     Patient can be scheduled

## 2020-07-07 NOTE — TELEPHONE ENCOUNTER
Dr Thania Isabel completed peer to peer on 7/6/2020.      Alka Sanchez MD  You 1 hour ago (8:39 AM)      Approved    Routing comment

## 2020-07-09 ENCOUNTER — APPOINTMENT (OUTPATIENT)
Dept: GENERAL RADIOLOGY | Facility: HOSPITAL | Age: 50
End: 2020-07-09
Attending: ANESTHESIOLOGY
Payer: COMMERCIAL

## 2020-07-09 ENCOUNTER — HOSPITAL ENCOUNTER (OUTPATIENT)
Facility: HOSPITAL | Age: 50
Setting detail: HOSPITAL OUTPATIENT SURGERY
Discharge: HOME | End: 2020-07-09
Attending: ANESTHESIOLOGY | Admitting: ANESTHESIOLOGY
Payer: COMMERCIAL

## 2020-07-09 VITALS
RESPIRATION RATE: 18 BRPM | SYSTOLIC BLOOD PRESSURE: 116 MMHG | DIASTOLIC BLOOD PRESSURE: 80 MMHG | TEMPERATURE: 97 F | HEART RATE: 61 BPM | OXYGEN SATURATION: 99 %

## 2020-07-09 DIAGNOSIS — M54.16 LUMBAR RADICULITIS: ICD-10-CM

## 2020-07-09 PROCEDURE — 3E0R33Z INTRODUCTION OF ANTI-INFLAMMATORY INTO SPINAL CANAL, PERCUTANEOUS APPROACH: ICD-10-PCS | Performed by: ANESTHESIOLOGY

## 2020-07-09 PROCEDURE — B01B1ZZ FLUOROSCOPY OF SPINAL CORD USING LOW OSMOLAR CONTRAST: ICD-10-PCS | Performed by: ANESTHESIOLOGY

## 2020-07-09 PROCEDURE — 3E0R3BZ INTRODUCTION OF ANESTHETIC AGENT INTO SPINAL CANAL, PERCUTANEOUS APPROACH: ICD-10-PCS | Performed by: ANESTHESIOLOGY

## 2020-07-09 RX ORDER — IBUPROFEN 400 MG/1
400 TABLET ORAL EVERY 6 HOURS PRN
Status: ON HOLD | COMMUNITY
End: 2020-09-18

## 2020-07-09 RX ORDER — ONDANSETRON 2 MG/ML
4 INJECTION INTRAMUSCULAR; INTRAVENOUS ONCE AS NEEDED
Status: DISCONTINUED | OUTPATIENT
Start: 2020-07-09 | End: 2020-07-09

## 2020-07-09 RX ORDER — DEXAMETHASONE SODIUM PHOSPHATE 10 MG/ML
INJECTION, SOLUTION INTRAMUSCULAR; INTRAVENOUS AS NEEDED
Status: DISCONTINUED | OUTPATIENT
Start: 2020-07-09 | End: 2020-07-09

## 2020-07-09 RX ORDER — SODIUM CHLORIDE, SODIUM LACTATE, POTASSIUM CHLORIDE, CALCIUM CHLORIDE 600; 310; 30; 20 MG/100ML; MG/100ML; MG/100ML; MG/100ML
100 INJECTION, SOLUTION INTRAVENOUS CONTINUOUS
Status: DISCONTINUED | OUTPATIENT
Start: 2020-07-09 | End: 2020-07-09

## 2020-07-09 RX ORDER — 0.9 % SODIUM CHLORIDE 0.9 %
VIAL (ML) INJECTION AS NEEDED
Status: DISCONTINUED | OUTPATIENT
Start: 2020-07-09 | End: 2020-07-09

## 2020-07-09 RX ORDER — LIDOCAINE HYDROCHLORIDE 10 MG/ML
INJECTION, SOLUTION EPIDURAL; INFILTRATION; INTRACAUDAL; PERINEURAL AS NEEDED
Status: DISCONTINUED | OUTPATIENT
Start: 2020-07-09 | End: 2020-07-09

## 2020-07-09 RX ORDER — DIPHENHYDRAMINE HYDROCHLORIDE 50 MG/ML
50 INJECTION INTRAMUSCULAR; INTRAVENOUS ONCE AS NEEDED
Status: DISCONTINUED | OUTPATIENT
Start: 2020-07-09 | End: 2020-07-09

## 2020-07-09 NOTE — H&P
History & Physical Examination    Patient Name: Nohemy Calvo  MRN: [id number]  CSN: [id number]  YOB: 1970    Pre-Operative Diagnosis:  Lumbar radiculitis [M54.16]    Present Illness: Patient with lumbar radiculopathy for epidural steroid i with plan of care. [ x ] I have reviewed the History and Physical done within the last 30 days. Any changes noted above.     Mike Ramon MD

## 2020-07-09 NOTE — OPERATIVE REPORT
[facility]  Operative Report  2020     Charu Carlson Patient Status:  Hospital Outpatient Surgery    1970 MRN [id number]   [facility] ENDOSCOPY Attending Dwain Carrillo MD   Hosp Day # 0 PCP MD Sulema Justice complication. The patient's back was cleaned and sterile dressing was applied. The patient was discharged with an instruction to a responsible adult after discharge criteria were met. The patient was advised to contact us should any problems happen.   Bernett Scheuermann

## 2020-07-10 ENCOUNTER — PATIENT MESSAGE (OUTPATIENT)
Dept: FAMILY MEDICINE CLINIC | Facility: CLINIC | Age: 50
End: 2020-07-10

## 2020-07-10 RX ORDER — GABAPENTIN 300 MG/1
300 CAPSULE ORAL 3 TIMES DAILY
Qty: 90 CAPSULE | Refills: 0 | Status: SHIPPED | OUTPATIENT
Start: 2020-07-10 | End: 2020-08-27 | Stop reason: ALTCHOICE

## 2020-07-10 NOTE — TELEPHONE ENCOUNTER
From: Louise Mccord  To: Geovanni Davis PA-C  Sent: 7/10/2020 8:35 AM CDT  Subject: Prescription Question    While I've been seeing some improvements since the herniated disk, I'm still having a lot of shooting pain, and burning sensations in my lo

## 2020-08-05 ENCOUNTER — HOSPITAL ENCOUNTER (OUTPATIENT)
Dept: GENERAL RADIOLOGY | Facility: HOSPITAL | Age: 50
Discharge: HOME | End: 2020-08-05
Attending: NEUROLOGICAL SURGERY
Payer: COMMERCIAL

## 2020-08-05 ENCOUNTER — OFFICE VISIT (OUTPATIENT)
Dept: SURGERY | Facility: CLINIC | Age: 50
End: 2020-08-05
Payer: COMMERCIAL

## 2020-08-05 VITALS
HEART RATE: 77 BPM | HEIGHT: 70.5 IN | WEIGHT: 160 LBS | SYSTOLIC BLOOD PRESSURE: 132 MMHG | DIASTOLIC BLOOD PRESSURE: 80 MMHG | BODY MASS INDEX: 22.65 KG/M2

## 2020-08-05 DIAGNOSIS — M51.16 LUMBAR DISC HERNIATION WITH RADICULOPATHY: Primary | ICD-10-CM

## 2020-08-05 DIAGNOSIS — M51.16 LUMBAR DISC HERNIATION WITH RADICULOPATHY: ICD-10-CM

## 2020-08-05 DIAGNOSIS — M54.59 MECHANICAL LOW BACK PAIN: ICD-10-CM

## 2020-08-05 PROCEDURE — 3079F DIAST BP 80-89 MM HG: CPT | Performed by: NEUROLOGICAL SURGERY

## 2020-08-05 PROCEDURE — 72114 X-RAY EXAM L-S SPINE BENDING: CPT | Performed by: NEUROLOGICAL SURGERY

## 2020-08-05 PROCEDURE — 3075F SYST BP GE 130 - 139MM HG: CPT | Performed by: NEUROLOGICAL SURGERY

## 2020-08-05 PROCEDURE — 3008F BODY MASS INDEX DOCD: CPT | Performed by: NEUROLOGICAL SURGERY

## 2020-08-05 PROCEDURE — 99204 OFFICE O/P NEW MOD 45 MIN: CPT | Performed by: NEUROLOGICAL SURGERY

## 2020-08-05 NOTE — PROGRESS NOTES
Pt is established with pain management. Location of Pain: pain in hip joint region radiating down to toes. Pt states herniated disk L4-L5 and L5-S1 slight bulge from month and a half ago.  Pt states he had injections for that which helped a little but d

## 2020-08-05 NOTE — PROGRESS NOTES
[facility]  Neurological Surgery Clinic Note    Name: Leroy Elena  : 1970  MRN: [id number]  PCP: Eris Cote MD    CHIEF COMPLAINT:  Back and bilateral leg pain.   (back pain = leg pain)    HISTORY OF PRESENT ILLNESS: Jonathan Khalil MD at [facility] 9    splenectomy   • TRANSFORAMINAL LUMBAR EPIDURAL STEROID INJECTION SINGLE LEVEL Left 6/3/2020    Performed by Gela Acevedo MD at [facility] ENDOSCOPY       FAMILY HISTORY:  Family history is un bilaterally. EOMI. Visual fields are full. Face is symmetrical. Tongue is midline. Uvula and palate elevate symmetrically. Shrug shoulders normally bilaterally. The rest of the cranial nerves are grossly intact.     Sensation to light touch is intact up after the x-rays to discuss treatment options (decompression vs ILIF vs TLIF)    Thank you very much for allowing me to participate in the care of Luis Manuel Masterson.     Trace Garay MD, [facility]  Neurological Surgeon  [address]

## 2020-08-14 ENCOUNTER — TELEPHONE (OUTPATIENT)
Dept: SURGERY | Facility: CLINIC | Age: 50
End: 2020-08-14

## 2020-08-14 DIAGNOSIS — M51.16 DISPLACEMENT OF LUMBAR DISC WITH RADICULOPATHY: Primary | ICD-10-CM

## 2020-08-17 NOTE — TELEPHONE ENCOUNTER
Per last office visit note on 08/05, patient to f/u after xrays completed to discuss treatment options (decompression vs ILIF vs TLIF).   Please call patient for a f/u visit for surgical discussion, thank you

## 2020-08-18 ENCOUNTER — MED REC SCAN ONLY (OUTPATIENT)
Dept: FAMILY MEDICINE CLINIC | Facility: CLINIC | Age: 50
End: 2020-08-18

## 2020-08-18 NOTE — TELEPHONE ENCOUNTER
Patient needs to schedule an appointment with Osman Riddle for a surgical discussion. Message sent to  to arrange surgical discussion appointment.

## 2020-08-26 ENCOUNTER — TELEPHONE (OUTPATIENT)
Dept: SURGERY | Facility: CLINIC | Age: 50
End: 2020-08-26

## 2020-08-26 DIAGNOSIS — M51.16 DISPLACEMENT OF LUMBAR DISC WITH RADICULOPATHY: Primary | ICD-10-CM

## 2020-08-26 NOTE — TELEPHONE ENCOUNTER
You are scheduled for left lumbar four five far lateral discectomy on 9/17/20 with . Pre-op instructions discussed with patient and surgical packet provided:    · You will need to contact the Pre-admission department at [phone].  You will resume the medication before you are discharged from the hospital  · Post operative appointment to be made 2 weeks after surgery. Spine class is available online at [email or web address].  Please call the Care Coordinator, Petrona Witt, with any

## 2020-08-26 NOTE — TELEPHONE ENCOUNTER
Called patient due to Dr. Yuni Ibrahim doing emergency surgery today to see if we could get appointment for surgical discussion rescheduled. Patient indicated he doesn't need another appointment it has already been discussed with him what should happen.   He ju

## 2020-08-27 ENCOUNTER — TELEPHONE (OUTPATIENT)
Dept: FAMILY MEDICINE CLINIC | Facility: CLINIC | Age: 50
End: 2020-08-27

## 2020-08-27 DIAGNOSIS — Z01.818 PRE-OP TESTING: Primary | ICD-10-CM

## 2020-08-27 RX ORDER — ACETAMINOPHEN 325 MG/1
1000 TABLET ORAL ONCE
Status: ON HOLD | COMMUNITY
End: 2020-09-18

## 2020-08-27 RX ORDER — NAPROXEN SODIUM 220 MG
TABLET ORAL
Status: ON HOLD | COMMUNITY
End: 2020-09-18

## 2020-08-27 NOTE — TELEPHONE ENCOUNTER
Spoke with Bhumi Shepard from [facility] who is requesting clarification regarding need to have \"T and S\" lab completed. Informed Bhumi Shepard that providers typically do not require lumbar surgery patients to have that specific lab completed unless the PCP requests it.   D

## 2020-08-27 NOTE — TELEPHONE ENCOUNTER
Received request for an H&P, CBC, CMP, Type and Screen, PT/PTT/INR, EKG, CXR, MRSA, MSSA Nasal Swab, COVID-19 testing. PSR;s to schedule pt and advise that orders will be available to schedule tomorrow morning.     Called Neuro, Dr Crystal Hargrove office, spoke

## 2020-08-27 NOTE — TELEPHONE ENCOUNTER
Phone call opened in error - note previously from MA indicating that patient needs preoperative clearance per notification from neurosurgery.

## 2020-09-03 ENCOUNTER — HOSPITAL ENCOUNTER (OUTPATIENT)
Dept: GENERAL RADIOLOGY | Facility: HOSPITAL | Age: 50
Discharge: HOME | End: 2020-09-03
Attending: FAMILY MEDICINE
Payer: COMMERCIAL

## 2020-09-03 DIAGNOSIS — Z01.818 PRE-OP TESTING: ICD-10-CM

## 2020-09-03 PROCEDURE — 71046 X-RAY EXAM CHEST 2 VIEWS: CPT | Performed by: FAMILY MEDICINE

## 2020-09-08 ENCOUNTER — LAB ENCOUNTER (OUTPATIENT)
Dept: LAB | Facility: HOSPITAL | Age: 50
End: 2020-09-08
Attending: FAMILY MEDICINE
Payer: COMMERCIAL

## 2020-09-08 ENCOUNTER — OFFICE VISIT (OUTPATIENT)
Dept: FAMILY MEDICINE CLINIC | Facility: CLINIC | Age: 50
End: 2020-09-08
Payer: COMMERCIAL

## 2020-09-08 ENCOUNTER — HOSPITAL ENCOUNTER (OUTPATIENT)
Dept: CV DIAGNOSTICS | Facility: HOSPITAL | Age: 50
End: 2020-09-08
Attending: FAMILY MEDICINE
Payer: COMMERCIAL

## 2020-09-08 VITALS
TEMPERATURE: 99 F | OXYGEN SATURATION: 97 % | RESPIRATION RATE: 16 BRPM | WEIGHT: 163 LBS | SYSTOLIC BLOOD PRESSURE: 118 MMHG | HEART RATE: 60 BPM | HEIGHT: 70.5 IN | BODY MASS INDEX: 23.08 KG/M2 | DIASTOLIC BLOOD PRESSURE: 74 MMHG

## 2020-09-08 DIAGNOSIS — Z01.818 PRE-OP TESTING: ICD-10-CM

## 2020-09-08 DIAGNOSIS — M51.16 LUMBAR DISC HERNIATION WITH RADICULOPATHY: Primary | ICD-10-CM

## 2020-09-08 DIAGNOSIS — Z01.818 PRE-OPERATIVE CLEARANCE: ICD-10-CM

## 2020-09-08 PROBLEM — G47.30 SLEEP APNEA IN ADULT: Status: ACTIVE | Noted: 2020-09-08

## 2020-09-08 LAB
ALBUMIN SERPL-MCNC: 3.8 G/DL (ref 3.4–5)
ALBUMIN/GLOB SERPL: 1 {RATIO} (ref 1–2)
ALP LIVER SERPL-CCNC: 56 U/L (ref 45–117)
ALT SERPL-CCNC: 25 U/L (ref 16–61)
ANION GAP SERPL CALC-SCNC: 2 MMOL/L (ref 0–18)
APTT PPP: 27.6 SECONDS (ref 25.4–36.1)
AST SERPL-CCNC: 16 U/L (ref 15–37)
ATRIAL RATE: 57 BPM
BASOPHILS # BLD AUTO: 0.06 X10(3) UL (ref 0–0.2)
BASOPHILS NFR BLD AUTO: 0.9 %
BILIRUB SERPL-MCNC: 0.7 MG/DL (ref 0.1–2)
BUN BLD-MCNC: 10 MG/DL (ref 7–18)
BUN/CREAT SERPL: 10.8 (ref 10–20)
CALCIUM BLD-MCNC: 9 MG/DL (ref 8.5–10.1)
CHLORIDE SERPL-SCNC: 106 MMOL/L (ref 98–112)
CO2 SERPL-SCNC: 30 MMOL/L (ref 21–32)
CREAT BLD-MCNC: 0.93 MG/DL (ref 0.7–1.3)
DEPRECATED RDW RBC AUTO: 44.7 FL (ref 35.1–46.3)
EOSINOPHIL # BLD AUTO: 0.05 X10(3) UL (ref 0–0.7)
EOSINOPHIL NFR BLD AUTO: 0.7 %
ERYTHROCYTE [DISTWIDTH] IN BLOOD BY AUTOMATED COUNT: 13.5 % (ref 11–15)
GLOBULIN PLAS-MCNC: 3.7 G/DL (ref 2.8–4.4)
GLUCOSE BLD-MCNC: 99 MG/DL (ref 70–99)
HCT VFR BLD AUTO: 46.1 % (ref 39–53)
HGB BLD-MCNC: 14.9 G/DL (ref 13–17.5)
IMM GRANULOCYTES # BLD AUTO: 0.01 X10(3) UL (ref 0–1)
IMM GRANULOCYTES NFR BLD: 0.1 %
INR BLD: 0.98 (ref 0.89–1.11)
LYMPHOCYTES # BLD AUTO: 1.44 X10(3) UL (ref 1–4)
LYMPHOCYTES NFR BLD AUTO: 21.4 %
M PROTEIN MFR SERPL ELPH: 7.5 G/DL (ref 6.4–8.2)
MCH RBC QN AUTO: 29 PG (ref 26–34)
MCHC RBC AUTO-ENTMCNC: 32.3 G/DL (ref 31–37)
MCV RBC AUTO: 89.9 FL (ref 80–100)
MONOCYTES # BLD AUTO: 0.38 X10(3) UL (ref 0.1–1)
MONOCYTES NFR BLD AUTO: 5.6 %
NEUTROPHILS # BLD AUTO: 4.79 X10 (3) UL (ref 1.5–7.7)
NEUTROPHILS # BLD AUTO: 4.79 X10(3) UL (ref 1.5–7.7)
NEUTROPHILS NFR BLD AUTO: 71.3 %
OSMOLALITY SERPL CALC.SUM OF ELEC: 285 MOSM/KG (ref 275–295)
P AXIS: 62 DEGREES
P-R INTERVAL: 138 MS
PATIENT FASTING Y/N/NP: YES
PLATELET # BLD AUTO: 443 10(3)UL (ref 150–450)
POTASSIUM SERPL-SCNC: 4.3 MMOL/L (ref 3.5–5.1)
PSA SERPL DL<=0.01 NG/ML-MCNC: 13.3 SECONDS (ref 12.4–14.6)
Q-T INTERVAL: 384 MS
QRS DURATION: 80 MS
QTC CALCULATION (BEZET): 373 MS
R AXIS: 71 DEGREES
RBC # BLD AUTO: 5.13 X10(6)UL (ref 4.3–5.7)
SODIUM SERPL-SCNC: 138 MMOL/L (ref 136–145)
T AXIS: 61 DEGREES
VENTRICULAR RATE: 57 BPM
WBC # BLD AUTO: 6.7 X10(3) UL (ref 4–11)

## 2020-09-08 PROCEDURE — 36415 COLL VENOUS BLD VENIPUNCTURE: CPT

## 2020-09-08 PROCEDURE — 93010 ELECTROCARDIOGRAM REPORT: CPT | Performed by: INTERNAL MEDICINE

## 2020-09-08 PROCEDURE — 85025 COMPLETE CBC W/AUTO DIFF WBC: CPT

## 2020-09-08 PROCEDURE — 87081 CULTURE SCREEN ONLY: CPT

## 2020-09-08 PROCEDURE — 80053 COMPREHEN METABOLIC PANEL: CPT

## 2020-09-08 PROCEDURE — 3074F SYST BP LT 130 MM HG: CPT | Performed by: FAMILY MEDICINE

## 2020-09-08 PROCEDURE — 85610 PROTHROMBIN TIME: CPT

## 2020-09-08 PROCEDURE — 3008F BODY MASS INDEX DOCD: CPT | Performed by: FAMILY MEDICINE

## 2020-09-08 PROCEDURE — 85730 THROMBOPLASTIN TIME PARTIAL: CPT

## 2020-09-08 PROCEDURE — 3078F DIAST BP <80 MM HG: CPT | Performed by: FAMILY MEDICINE

## 2020-09-08 PROCEDURE — 93005 ELECTROCARDIOGRAM TRACING: CPT

## 2020-09-08 PROCEDURE — 99242 OFF/OP CONSLTJ NEW/EST SF 20: CPT | Performed by: FAMILY MEDICINE

## 2020-09-08 NOTE — H&P
Kari Villatoro is a 54year old male who presents for a pre-operative physical exam. Patient is to have left lumbar 4/5 far lateral discectomy to be done by Dr. Ana Maria Ayala at [facility] on 9/17/20. HPI:   Pt complains of chronic low back pain.   Started abo currently - no medication  HEMATOLOGIC: denies hx of anemia  ENDOCRINE: denies thyroid history  ALL/ASTHMA: denies hx of asthma  Has oral appliance for sleep apnea.    EXAM:   /74 (BP Location: Left arm, Patient Position: Sitting, Cuff Size: adult)

## 2020-09-08 NOTE — TELEPHONE ENCOUNTER
Per PCP \"Patient is in satisfactory condition and acceptable risk for planned surgery and anesthesia\"

## 2020-09-08 NOTE — PATIENT INSTRUCTIONS
12:00 today - outpatient testing at [facility]. Park in [place]. Enter through big doors and look for outpatient testing just past the volunteer desk.
yes

## 2020-09-09 NOTE — TELEPHONE ENCOUNTER
Prior authorization request completed for:  left lumbar four five far lateral discectomy  Authorization #[id number]  Authorization dates: 9/17/20  CPT codes approved: 21112  Number of visits/dates of service approved: o outpatient  Physician: Dr Flores Pock

## 2020-09-14 ENCOUNTER — APPOINTMENT (OUTPATIENT)
Dept: LAB | Facility: HOSPITAL | Age: 50
End: 2020-09-14
Attending: NEUROLOGICAL SURGERY
Payer: COMMERCIAL

## 2020-09-14 DIAGNOSIS — M54.16 LUMBAR RADICULOPATHY, ACUTE: ICD-10-CM

## 2020-09-16 LAB — SARS-COV-2 RNA RESP QL NAA+PROBE: NOT DETECTED

## 2020-09-17 ENCOUNTER — APPOINTMENT (OUTPATIENT)
Dept: GENERAL RADIOLOGY | Facility: HOSPITAL | Age: 50
End: 2020-09-17
Attending: NEUROLOGICAL SURGERY
Payer: COMMERCIAL

## 2020-09-17 ENCOUNTER — ANESTHESIA EVENT (OUTPATIENT)
Dept: SURGERY | Facility: HOSPITAL | Age: 50
End: 2020-09-17
Payer: COMMERCIAL

## 2020-09-17 ENCOUNTER — HOSPITAL ENCOUNTER (OUTPATIENT)
Facility: HOSPITAL | Age: 50
Discharge: HOME | End: 2020-09-18
Attending: NEUROLOGICAL SURGERY | Admitting: NEUROLOGICAL SURGERY
Payer: COMMERCIAL

## 2020-09-17 ENCOUNTER — ANESTHESIA (OUTPATIENT)
Dept: SURGERY | Facility: HOSPITAL | Age: 50
End: 2020-09-17
Payer: COMMERCIAL

## 2020-09-17 DIAGNOSIS — M51.16 DISPLACEMENT OF LUMBAR DISC WITH RADICULOPATHY: ICD-10-CM

## 2020-09-17 DIAGNOSIS — M54.16 LUMBAR RADICULOPATHY, ACUTE: Primary | ICD-10-CM

## 2020-09-17 PROCEDURE — 99213 OFFICE O/P EST LOW 20 MIN: CPT | Performed by: INTERNAL MEDICINE

## 2020-09-17 PROCEDURE — 76000 FLUOROSCOPY <1 HR PHYS/QHP: CPT | Performed by: NEUROLOGICAL SURGERY

## 2020-09-17 PROCEDURE — 3078F DIAST BP <80 MM HG: CPT | Performed by: INTERNAL MEDICINE

## 2020-09-17 PROCEDURE — 3008F BODY MASS INDEX DOCD: CPT | Performed by: INTERNAL MEDICINE

## 2020-09-17 PROCEDURE — 3074F SYST BP LT 130 MM HG: CPT | Performed by: INTERNAL MEDICINE

## 2020-09-17 PROCEDURE — 0SB20ZZ EXCISION OF LUMBAR VERTEBRAL DISC, OPEN APPROACH: ICD-10-PCS | Performed by: NEUROLOGICAL SURGERY

## 2020-09-17 RX ORDER — GLYCOPYRROLATE 0.2 MG/ML
INJECTION, SOLUTION INTRAMUSCULAR; INTRAVENOUS AS NEEDED
Status: DISCONTINUED | OUTPATIENT
Start: 2020-09-17 | End: 2020-09-17 | Stop reason: SURG

## 2020-09-17 RX ORDER — DOCUSATE SODIUM 100 MG/1
100 CAPSULE, LIQUID FILLED ORAL 2 TIMES DAILY
Status: DISCONTINUED | OUTPATIENT
Start: 2020-09-17 | End: 2020-09-18

## 2020-09-17 RX ORDER — CEFAZOLIN SODIUM/WATER 2 G/20 ML
2 SYRINGE (ML) INTRAVENOUS EVERY 8 HOURS
Status: DISCONTINUED | OUTPATIENT
Start: 2020-09-17 | End: 2020-09-18

## 2020-09-17 RX ORDER — HYDROCODONE BITARTRATE AND ACETAMINOPHEN 5; 325 MG/1; MG/1
2 TABLET ORAL AS NEEDED
Status: COMPLETED | OUTPATIENT
Start: 2020-09-17 | End: 2020-09-17

## 2020-09-17 RX ORDER — SODIUM PHOSPHATE, DIBASIC AND SODIUM PHOSPHATE, MONOBASIC 7; 19 G/133ML; G/133ML
1 ENEMA RECTAL ONCE AS NEEDED
Status: DISCONTINUED | OUTPATIENT
Start: 2020-09-17 | End: 2020-09-18

## 2020-09-17 RX ORDER — KETOROLAC TROMETHAMINE 30 MG/ML
30 INJECTION, SOLUTION INTRAMUSCULAR; INTRAVENOUS EVERY 6 HOURS
Status: DISCONTINUED | OUTPATIENT
Start: 2020-09-17 | End: 2020-09-18

## 2020-09-17 RX ORDER — CEFAZOLIN SODIUM/WATER 2 G/20 ML
SYRINGE (ML) INTRAVENOUS
Status: DISPENSED
Start: 2020-09-17 | End: 2020-09-17

## 2020-09-17 RX ORDER — NALOXONE HYDROCHLORIDE 0.4 MG/ML
80 INJECTION, SOLUTION INTRAMUSCULAR; INTRAVENOUS; SUBCUTANEOUS AS NEEDED
Status: DISCONTINUED | OUTPATIENT
Start: 2020-09-17 | End: 2020-09-17 | Stop reason: HOSPADM

## 2020-09-17 RX ORDER — METOCLOPRAMIDE HYDROCHLORIDE 5 MG/ML
10 INJECTION INTRAMUSCULAR; INTRAVENOUS EVERY 6 HOURS PRN
Status: DISCONTINUED | OUTPATIENT
Start: 2020-09-17 | End: 2020-09-18

## 2020-09-17 RX ORDER — SODIUM CHLORIDE, SODIUM LACTATE, POTASSIUM CHLORIDE, CALCIUM CHLORIDE 600; 310; 30; 20 MG/100ML; MG/100ML; MG/100ML; MG/100ML
INJECTION, SOLUTION INTRAVENOUS CONTINUOUS
Status: DISCONTINUED | OUTPATIENT
Start: 2020-09-17 | End: 2020-09-17 | Stop reason: HOSPADM

## 2020-09-17 RX ORDER — HYDROMORPHONE HYDROCHLORIDE 1 MG/ML
0.4 INJECTION, SOLUTION INTRAMUSCULAR; INTRAVENOUS; SUBCUTANEOUS EVERY 5 MIN PRN
Status: DISCONTINUED | OUTPATIENT
Start: 2020-09-17 | End: 2020-09-17 | Stop reason: HOSPADM

## 2020-09-17 RX ORDER — HYDROCODONE BITARTRATE AND ACETAMINOPHEN 5; 325 MG/1; MG/1
TABLET ORAL
Status: COMPLETED
Start: 2020-09-17 | End: 2020-09-17

## 2020-09-17 RX ORDER — SENNOSIDES 8.6 MG
17.2 TABLET ORAL NIGHTLY
Status: DISCONTINUED | OUTPATIENT
Start: 2020-09-17 | End: 2020-09-18

## 2020-09-17 RX ORDER — HYDROCODONE BITARTRATE AND ACETAMINOPHEN 10; 325 MG/1; MG/1
1 TABLET ORAL EVERY 4 HOURS PRN
Status: DISCONTINUED | OUTPATIENT
Start: 2020-09-17 | End: 2020-09-18

## 2020-09-17 RX ORDER — ACETAMINOPHEN 500 MG
1000 TABLET ORAL ONCE
Status: DISCONTINUED | OUTPATIENT
Start: 2020-09-17 | End: 2020-09-17 | Stop reason: HOSPADM

## 2020-09-17 RX ORDER — PROCHLORPERAZINE EDISYLATE 5 MG/ML
10 INJECTION INTRAMUSCULAR; INTRAVENOUS EVERY 6 HOURS PRN
Status: DISCONTINUED | OUTPATIENT
Start: 2020-09-17 | End: 2020-09-18

## 2020-09-17 RX ORDER — EPHEDRINE SULFATE 50 MG/ML
INJECTION, SOLUTION INTRAVENOUS AS NEEDED
Status: DISCONTINUED | OUTPATIENT
Start: 2020-09-17 | End: 2020-09-17 | Stop reason: SURG

## 2020-09-17 RX ORDER — MORPHINE SULFATE 4 MG/ML
4 INJECTION, SOLUTION INTRAMUSCULAR; INTRAVENOUS EVERY 2 HOUR PRN
Status: DISCONTINUED | OUTPATIENT
Start: 2020-09-17 | End: 2020-09-18

## 2020-09-17 RX ORDER — ONDANSETRON 2 MG/ML
INJECTION INTRAMUSCULAR; INTRAVENOUS AS NEEDED
Status: DISCONTINUED | OUTPATIENT
Start: 2020-09-17 | End: 2020-09-17 | Stop reason: SURG

## 2020-09-17 RX ORDER — BACITRACIN 50000 [USP'U]/1
INJECTION, POWDER, LYOPHILIZED, FOR SOLUTION INTRAMUSCULAR AS NEEDED
Status: DISCONTINUED | OUTPATIENT
Start: 2020-09-17 | End: 2020-09-17 | Stop reason: HOSPADM

## 2020-09-17 RX ORDER — POLYETHYLENE GLYCOL 3350 17 G/17G
17 POWDER, FOR SOLUTION ORAL DAILY PRN
Status: DISCONTINUED | OUTPATIENT
Start: 2020-09-17 | End: 2020-09-18

## 2020-09-17 RX ORDER — BUPIVACAINE HYDROCHLORIDE AND EPINEPHRINE 5; 5 MG/ML; UG/ML
INJECTION, SOLUTION EPIDURAL; INTRACAUDAL; PERINEURAL AS NEEDED
Status: DISCONTINUED | OUTPATIENT
Start: 2020-09-17 | End: 2020-09-17 | Stop reason: HOSPADM

## 2020-09-17 RX ORDER — HYDROCODONE BITARTRATE AND ACETAMINOPHEN 10; 325 MG/1; MG/1
2 TABLET ORAL EVERY 4 HOURS PRN
Status: DISCONTINUED | OUTPATIENT
Start: 2020-09-17 | End: 2020-09-18

## 2020-09-17 RX ORDER — ACETAMINOPHEN 500 MG
1000 TABLET ORAL ONCE AS NEEDED
Status: DISCONTINUED | OUTPATIENT
Start: 2020-09-17 | End: 2020-09-17 | Stop reason: HOSPADM

## 2020-09-17 RX ORDER — HYDROCODONE BITARTRATE AND ACETAMINOPHEN 5; 325 MG/1; MG/1
1 TABLET ORAL AS NEEDED
Status: COMPLETED | OUTPATIENT
Start: 2020-09-17 | End: 2020-09-17

## 2020-09-17 RX ORDER — SODIUM CHLORIDE 0.9 % (FLUSH) 0.9 %
SYRINGE (ML) INJECTION AS NEEDED
Status: DISCONTINUED | OUTPATIENT
Start: 2020-09-17 | End: 2020-09-17 | Stop reason: HOSPADM

## 2020-09-17 RX ORDER — MORPHINE SULFATE 2 MG/ML
1 INJECTION, SOLUTION INTRAMUSCULAR; INTRAVENOUS EVERY 2 HOUR PRN
Status: DISCONTINUED | OUTPATIENT
Start: 2020-09-17 | End: 2020-09-18

## 2020-09-17 RX ORDER — LIDOCAINE HYDROCHLORIDE 10 MG/ML
INJECTION, SOLUTION EPIDURAL; INFILTRATION; INTRACAUDAL; PERINEURAL AS NEEDED
Status: DISCONTINUED | OUTPATIENT
Start: 2020-09-17 | End: 2020-09-17 | Stop reason: SURG

## 2020-09-17 RX ORDER — DIAZEPAM 5 MG/ML
10 INJECTION, SOLUTION INTRAMUSCULAR; INTRAVENOUS EVERY 8 HOURS PRN
Status: DISCONTINUED | OUTPATIENT
Start: 2020-09-17 | End: 2020-09-18

## 2020-09-17 RX ORDER — SODIUM CHLORIDE, SODIUM LACTATE, POTASSIUM CHLORIDE, CALCIUM CHLORIDE 600; 310; 30; 20 MG/100ML; MG/100ML; MG/100ML; MG/100ML
INJECTION, SOLUTION INTRAVENOUS CONTINUOUS
Status: DISCONTINUED | OUTPATIENT
Start: 2020-09-17 | End: 2020-09-18

## 2020-09-17 RX ORDER — MORPHINE SULFATE 0.5 MG/ML
INJECTION, SOLUTION EPIDURAL; INTRATHECAL; INTRAVENOUS AS NEEDED
Status: DISCONTINUED | OUTPATIENT
Start: 2020-09-17 | End: 2020-09-17 | Stop reason: HOSPADM

## 2020-09-17 RX ORDER — MORPHINE SULFATE 2 MG/ML
2 INJECTION, SOLUTION INTRAMUSCULAR; INTRAVENOUS EVERY 2 HOUR PRN
Status: DISCONTINUED | OUTPATIENT
Start: 2020-09-17 | End: 2020-09-18

## 2020-09-17 RX ORDER — ROCURONIUM BROMIDE 10 MG/ML
INJECTION, SOLUTION INTRAVENOUS AS NEEDED
Status: DISCONTINUED | OUTPATIENT
Start: 2020-09-17 | End: 2020-09-17 | Stop reason: SURG

## 2020-09-17 RX ORDER — CEFAZOLIN SODIUM/WATER 2 G/20 ML
2 SYRINGE (ML) INTRAVENOUS ONCE
Status: COMPLETED | OUTPATIENT
Start: 2020-09-17 | End: 2020-09-17

## 2020-09-17 RX ORDER — DIPHENHYDRAMINE HCL 25 MG
25 CAPSULE ORAL EVERY 4 HOURS PRN
Status: DISCONTINUED | OUTPATIENT
Start: 2020-09-17 | End: 2020-09-18

## 2020-09-17 RX ORDER — ONDANSETRON 2 MG/ML
4 INJECTION INTRAMUSCULAR; INTRAVENOUS EVERY 4 HOURS PRN
Status: DISCONTINUED | OUTPATIENT
Start: 2020-09-17 | End: 2020-09-18

## 2020-09-17 RX ORDER — ONDANSETRON 2 MG/ML
4 INJECTION INTRAMUSCULAR; INTRAVENOUS AS NEEDED
Status: DISCONTINUED | OUTPATIENT
Start: 2020-09-17 | End: 2020-09-17 | Stop reason: HOSPADM

## 2020-09-17 RX ORDER — BISACODYL 10 MG
10 SUPPOSITORY, RECTAL RECTAL
Status: DISCONTINUED | OUTPATIENT
Start: 2020-09-17 | End: 2020-09-18

## 2020-09-17 RX ORDER — NEOSTIGMINE METHYLSULFATE 1 MG/ML
INJECTION INTRAVENOUS AS NEEDED
Status: DISCONTINUED | OUTPATIENT
Start: 2020-09-17 | End: 2020-09-17 | Stop reason: SURG

## 2020-09-17 RX ORDER — KETOROLAC TROMETHAMINE 30 MG/ML
INJECTION, SOLUTION INTRAMUSCULAR; INTRAVENOUS
Status: COMPLETED
Start: 2020-09-17 | End: 2020-09-17

## 2020-09-17 RX ORDER — ACETAMINOPHEN 325 MG/1
650 TABLET ORAL EVERY 4 HOURS PRN
Status: DISCONTINUED | OUTPATIENT
Start: 2020-09-17 | End: 2020-09-18

## 2020-09-17 RX ORDER — DEXTROSE, SODIUM CHLORIDE, AND POTASSIUM CHLORIDE 5; .45; .15 G/100ML; G/100ML; G/100ML
INJECTION INTRAVENOUS CONTINUOUS
Status: DISCONTINUED | OUTPATIENT
Start: 2020-09-17 | End: 2020-09-18

## 2020-09-17 RX ORDER — DEXAMETHASONE SODIUM PHOSPHATE 4 MG/ML
VIAL (ML) INJECTION AS NEEDED
Status: DISCONTINUED | OUTPATIENT
Start: 2020-09-17 | End: 2020-09-17 | Stop reason: SURG

## 2020-09-17 RX ORDER — DIPHENHYDRAMINE HYDROCHLORIDE 50 MG/ML
25 INJECTION INTRAMUSCULAR; INTRAVENOUS EVERY 4 HOURS PRN
Status: DISCONTINUED | OUTPATIENT
Start: 2020-09-17 | End: 2020-09-18

## 2020-09-17 RX ORDER — METOCLOPRAMIDE HYDROCHLORIDE 5 MG/ML
10 INJECTION INTRAMUSCULAR; INTRAVENOUS AS NEEDED
Status: DISCONTINUED | OUTPATIENT
Start: 2020-09-17 | End: 2020-09-17 | Stop reason: HOSPADM

## 2020-09-17 RX ADMIN — NEOSTIGMINE METHYLSULFATE 3.5 MG: 1 INJECTION INTRAVENOUS at 13:53:00

## 2020-09-17 RX ADMIN — SODIUM CHLORIDE, SODIUM LACTATE, POTASSIUM CHLORIDE, CALCIUM CHLORIDE: 600; 310; 30; 20 INJECTION, SOLUTION INTRAVENOUS at 14:08:00

## 2020-09-17 RX ADMIN — DEXAMETHASONE SODIUM PHOSPHATE 4 MG: 4 MG/ML VIAL (ML) INJECTION at 12:19:00

## 2020-09-17 RX ADMIN — GLYCOPYRROLATE 0.4 MG: 0.2 INJECTION, SOLUTION INTRAMUSCULAR; INTRAVENOUS at 13:53:00

## 2020-09-17 RX ADMIN — LIDOCAINE HYDROCHLORIDE 50 MG: 10 INJECTION, SOLUTION EPIDURAL; INFILTRATION; INTRACAUDAL; PERINEURAL at 12:09:00

## 2020-09-17 RX ADMIN — ROCURONIUM BROMIDE 50 MG: 10 INJECTION, SOLUTION INTRAVENOUS at 12:09:00

## 2020-09-17 RX ADMIN — SODIUM CHLORIDE, SODIUM LACTATE, POTASSIUM CHLORIDE, CALCIUM CHLORIDE: 600; 310; 30; 20 INJECTION, SOLUTION INTRAVENOUS at 12:05:00

## 2020-09-17 RX ADMIN — ROCURONIUM BROMIDE 20 MG: 10 INJECTION, SOLUTION INTRAVENOUS at 13:02:00

## 2020-09-17 RX ADMIN — EPHEDRINE SULFATE 10 MG: 50 INJECTION, SOLUTION INTRAVENOUS at 13:01:00

## 2020-09-17 RX ADMIN — ONDANSETRON 4 MG: 2 INJECTION INTRAMUSCULAR; INTRAVENOUS at 13:37:00

## 2020-09-17 RX ADMIN — CEFAZOLIN SODIUM/WATER 2 G: 2 G/20 ML SYRINGE (ML) INTRAVENOUS at 12:19:00

## 2020-09-17 NOTE — PLAN OF CARE
Post op plan of care d/w patient and spouse at bedside. Rn notified Dr. Bird Zaragoza for new consult for post op medical management. Denies pain, Eating. Voiding freely. Resting in bed, warm blanket given. Back dermabond clean and dry. Will monitor.      Problem: P Problem: PAIN - ADULT  Goal: Verbalizes/displays adequate comfort level or patient's stated pain goal  Description  INTERVENTIONS:  - Encourage pt to monitor pain and request assistance  - Assess pain using appropriate pain scale  - Administer analgesics

## 2020-09-17 NOTE — CONSULTS
[facility]  Dalton Nguyen [address] Patient Status:  Outpatient in a Bed    1970 MRN [id number]   [facility] 3SW-A Attending Lexi Mejia MD   Hosp Day # 0 PCP Sandra Morales MD     Reason for consult: medical man Oral Tab, Take by mouth., Disp: , Rfl:   ibuprofen 400 MG Oral Tab, Take 400 mg by mouth every 6 (six) hours as needed for Pain., Disp: , Rfl:         Review of Systems:   A comprehensive 14 point review of systems was completed.     Pertinent positives and discussed with patient, LAY Valentin,   9/17/2020

## 2020-09-17 NOTE — ANESTHESIA PROCEDURE NOTES
Airway  Urgency: elective      General Information and Staff    Patient location during procedure: OR  Anesthesiologist: Tacos Bobo DO  Performed: anesthesiologist     Indications and Patient Condition  Indications for airway management: anesthesia  Sedat

## 2020-09-17 NOTE — ANESTHESIA PREPROCEDURE EVALUATION
PRE-OP EVALUATION    Patient Name: Ewa Raymundo    Pre-op Diagnosis: Displacement of lumbar disc with radiculopathy [M51.16]    Procedure(s):  left lumbar four five far lateral discectomy    Surgeon(s) and Role:     Matthew Sosa MD - Primary    Pr ENDOSCOPY     Social History    Tobacco Use      Smoking status: Never Smoker      Smokeless tobacco: Never Used    Alcohol use: Not Currently      Drug use: Unknown     Available pre-op labs reviewed.   Lab Results   Component Value Date    WBC 6.7 09/08/2

## 2020-09-17 NOTE — ANESTHESIA POSTPROCEDURE EVALUATION
[address] [facility] Patient Status:  Outpatient in a Bed   Age/Gender 54year old male MRN [id number]   [facility] SURGERY Attending Katharine Mariscal MD   [facility] Day # 0 PCP Lauryn Nevarez MD       Anesthesia Post-op Note    P

## 2020-09-17 NOTE — OPERATIVE REPORT
[facility]  Operative Note    Ronda [place]   Patient Status:  Outpatient in a Bed    1970 MRN [id number]   [facility] SURGERY Attending Len Guevara MD   PCP Jimena Bright MD Date of Surgery 2020     PREOPERATIVE S1. This was confirmed under fluoroscopic guidance. We used a series of dilators to dilate to the working channel size. We then placed the retractor system over the dilators and held it into place using the retractor arm affixed to the bed.   We removed the interrupted fashion. The skin edges were approximated using 4-0 Vicryl in a running subcuticular fashion. The wound was covered with Dermabond and  no dressing was applied. All sponge, instrument and needle counts were correct at the end of the case.   E

## 2020-09-17 NOTE — H&P
[facility]  Neurological Surgery History and Physical    Anabelle Kyle, 54year old male Patient Status:  Outpatient in a Bed    1970 MRN [id number]   Location [address] [place] [address] Attending Lito Colon MD   [facility]       PAST SURGICAL HISTORY:        Past Surgical History:   Procedure Laterality Date   • LUMBAR INTERLAMINAR EPIDURAL INJECTION N/A 7/9/2020     Performed by Yesenia Garces MD at [facility]   • TRANSFOR to touch. No mottling. NEUROLOGICAL:  This patient is alert and orientated x 3. Speech and comprehension is normal. Able to follow simple commands. Pupils equally round and reactive to light. 3+ brisk bilaterally. EOMI. Visual fields are full.   Burak Fay nucleus (diskectomy). Decompressing the nerve root relieves the sciatic pain in the leg. The procedure takes about an hour. Most individuals can return to normal or light limited activities within a day or two.  Athletic activities can be resumed in one mon

## 2020-09-18 VITALS
TEMPERATURE: 99 F | SYSTOLIC BLOOD PRESSURE: 109 MMHG | BODY MASS INDEX: 23.13 KG/M2 | RESPIRATION RATE: 16 BRPM | HEART RATE: 62 BPM | HEIGHT: 70.5 IN | DIASTOLIC BLOOD PRESSURE: 70 MMHG | WEIGHT: 163.38 LBS | OXYGEN SATURATION: 96 %

## 2020-09-18 PROCEDURE — 99212 OFFICE O/P EST SF 10 MIN: CPT | Performed by: HOSPITALIST

## 2020-09-18 PROCEDURE — 99024 POSTOP FOLLOW-UP VISIT: CPT | Performed by: PHYSICIAN ASSISTANT

## 2020-09-18 RX ORDER — CYCLOBENZAPRINE HCL 10 MG
10 TABLET ORAL 3 TIMES DAILY PRN
Qty: 30 TABLET | Refills: 0 | Status: SHIPPED | OUTPATIENT
Start: 2020-09-18 | End: 2020-11-11

## 2020-09-18 RX ORDER — HYDROCODONE BITARTRATE AND ACETAMINOPHEN 10; 325 MG/1; MG/1
1 TABLET ORAL EVERY 8 HOURS PRN
Qty: 30 TABLET | Refills: 0 | Status: SHIPPED | OUTPATIENT
Start: 2020-09-18 | End: 2020-11-11

## 2020-09-18 NOTE — PROGRESS NOTES
[facility]  Progress Note     Lei Ferguson Patient Status:  Outpatient in a Bed    1970 MRN [id number]   [facility] 3SW-A Attending Ugo Robles MD   Hosp Day # 0 PCP Nieves Marrero MD     Chief Complaint: L surger questions      Juan J Harrison MD

## 2020-09-18 NOTE — PLAN OF CARE
Patient received still in bed, appeared to be comfortable. Rating post op pain at a 1, reported having mild discomfort to surgical site. Surgical site with derma bond, KUSHAL, no signs of infection noted.   Patient reported still with some mild tingling to t

## 2020-09-18 NOTE — OCCUPATIONAL THERAPY NOTE
OCCUPATIONAL THERAPY QUICK EVALUATION - INPATIENT    Room Number: 383/383-A  Evaluation Date: 9/18/2020     Type of Evaluation: Initial  Presenting Problem: L4-5 facetectomy    Physician Order: IP Consult to Occupational Therapy  Reason for Therapy:  ADL/I 1  Location: back  Management Techniques: Activity promotion; Body mechanics; Relaxation;Repositioning    COGNITION      RANGE OF MOTION AND STRENGTH ASSESSMENT  Upper extremity ROM is within functional limits     Upper extremity strength is within functiona aware of session/findings; All patient questions and concerns addressed    ASSESSMENT     Patient is a 54year old male admitted on 9/17/2020 for L4-5 facetectomy. Complete medical history and occupational profile noted above.  Functional outcome measures co

## 2020-09-18 NOTE — PROGRESS NOTES
NURSING DISCHARGE NOTE    Discharged Home via Wheelchair. Accompanied by Spouse  Belongings Taken by patient/family. Discharge instructions discussed with patient and wife, both verbalized understanding.

## 2020-09-18 NOTE — PLAN OF CARE
Pain to surgical site minimal. Declined need for oral prn med. Pt moving well, ambulated in hallway last night. Pre-op pain to BLE much improved, no leg weakness, n/t to lt leg still present but minimal. No drainage noted to lower back incision.  PT/OT eval

## 2020-09-18 NOTE — PHYSICAL THERAPY NOTE
PHYSICAL THERAPY QUICK EVALUATION - INPATIENT    Room Number: 383/383-A  Evaluation Date: 9/18/2020  Presenting Problem: L L4-5 far lateral discectomy 9/17/20  Physician Order: PT Eval and Treat    History related to current admission:   Pt is 53 yo male limits     NEUROLOGICAL FINDINGS  Neurological Findings: Sensation           Sensation: (intact to light touch B LE's)       ACTIVITY TOLERANCE                         O2 WALK                  AM-PAC '6-Clicks' INPATIENT SHORT FORM - BASIC MOBILITY  How mu precautions with activities. Pt appropriate for d/c to home from PT standpoint. RN aware. Patient End of Session: Up in chair;Needs met;Call light within reach;RN aware of session/findings; All patient questions and concerns addressed    ASSESSMENT   Pa

## 2020-09-18 NOTE — PROGRESS NOTES
[facility]  Neurosurgery Progress Note  2020    Stephan Griffith Patient Status:  Outpatient in a Bed    1970 MRN [id number]   [facility] 3SW-A Attending Rhiannon Treadwell MD   Hosp Day # 0 PCP Azar Vega MD     SUBJECT

## 2020-09-18 NOTE — DISCHARGE SUMMARY
[facility]  Discharge Summary    Stephan Griffith Patient Status:  Outpatient in a Bed    1970 MRN [id number]   [facility] 3SW-A Attending No att. providers found   Hosp Day # 0 PCP Azar Vega MD       Admit date:  20

## 2020-09-30 ENCOUNTER — OFFICE VISIT (OUTPATIENT)
Dept: SURGERY | Facility: CLINIC | Age: 50
End: 2020-09-30
Payer: COMMERCIAL

## 2020-09-30 VITALS — HEART RATE: 64 BPM | SYSTOLIC BLOOD PRESSURE: 126 MMHG | DIASTOLIC BLOOD PRESSURE: 70 MMHG

## 2020-09-30 DIAGNOSIS — M51.16 DISPLACEMENT OF LUMBAR DISC WITH RADICULOPATHY: Primary | ICD-10-CM

## 2020-09-30 DIAGNOSIS — G57.01 PIRIFORMIS SYNDROME OF RIGHT SIDE: ICD-10-CM

## 2020-09-30 PROCEDURE — 3078F DIAST BP <80 MM HG: CPT | Performed by: NEUROLOGICAL SURGERY

## 2020-09-30 PROCEDURE — 3074F SYST BP LT 130 MM HG: CPT | Performed by: NEUROLOGICAL SURGERY

## 2020-09-30 PROCEDURE — 99024 POSTOP FOLLOW-UP VISIT: CPT | Performed by: NEUROLOGICAL SURGERY

## 2020-09-30 NOTE — PROGRESS NOTES
[facility]  Neurological Surgery Follow Up Clinic Note    Wesley Paz 52year old, male    1970 MRN [id number]   PCP Iline Felty, MD Allergies No Known Allergies     SUBJECTIVE:  Greta Mckeon is s/p left far left L4-5 discec Piriformis syndrome is a neuromuscular disorder that occurs when the sciatic nerve is compressed or otherwise irritated by the piriformis muscle. The pain is often initiated by sitting and walking for a longer period.   The syndrome may be due to QUARTERMAIN Overuse injury resulting in piriformis syndrome can result from activities performed in the sitting position that involves strenuous use of the legs as in rowing/sculling and bicycling.   Runners, bicyclists and other athletes engaging in forward-moving act The affected nerve in piriformis syndrome is the sciatic nerve. Collectively the tibial and common fibular nerve makes up the general term sciatic nerve.   The sciatic nerve originates from the L4- S2 nerve roots and goes to the front of the sacrum, passing Symptomatic relief of muscle and nerve pain can be obtained by non-steroidal anti-inflammatory drugs and/or muscle relaxants.  Conservative treatment usually begins with stretching exercises, myofascial release, massage, and avoidance of contributory [place]

## 2020-09-30 NOTE — PATIENT INSTRUCTIONS
Refill policies:    • Allow 2-3 business days for refills; controlled substances may take longer.   • Contact your pharmacy at least 5 days prior to running out of medication and have them send an electronic request or submit request through the “request re Depending on your insurance carrier, approval may take 3-10 days. It is highly recommended patients contact their insurance carrier directly to determine coverage.   If test is done without insurance authorization, patient may be responsible for the entire Price Per Unit Or Per Cc In $ (Use Numbers Only, No Special Characters Or $): 12

## 2020-10-09 ENCOUNTER — TELEPHONE (OUTPATIENT)
Dept: SURGERY | Facility: CLINIC | Age: 50
End: 2020-10-09

## 2020-10-20 ENCOUNTER — TELEPHONE (OUTPATIENT)
Dept: SURGERY | Facility: CLINIC | Age: 50
End: 2020-10-20

## 2020-10-20 NOTE — TELEPHONE ENCOUNTER
Rcvd fax from [facility] of [address] dated 10/19/20, Endorsed to provider for signature.

## 2020-10-22 NOTE — TELEPHONE ENCOUNTER
Faxed signed forms to [facility] @ [phone]. [facility] received[facility]

## 2020-10-27 ENCOUNTER — TELEPHONE (OUTPATIENT)
Dept: SURGERY | Facility: CLINIC | Age: 50
End: 2020-10-27

## 2020-10-27 NOTE — TELEPHONE ENCOUNTER
Attempted to call Danielle Sun, however, her phone was forwarded to another therapist who took my number and will have Danielle Sun call me back

## 2020-10-28 ENCOUNTER — OFFICE VISIT (OUTPATIENT)
Dept: SURGERY | Facility: CLINIC | Age: 50
End: 2020-10-28
Payer: COMMERCIAL

## 2020-10-28 ENCOUNTER — HOSPITAL ENCOUNTER (OUTPATIENT)
Dept: GENERAL RADIOLOGY | Facility: HOSPITAL | Age: 50
Discharge: HOME | End: 2020-10-28
Attending: NEUROLOGICAL SURGERY
Payer: COMMERCIAL

## 2020-10-28 ENCOUNTER — PATIENT MESSAGE (OUTPATIENT)
Dept: FAMILY MEDICINE CLINIC | Facility: CLINIC | Age: 50
End: 2020-10-28

## 2020-10-28 VITALS — HEART RATE: 73 BPM | SYSTOLIC BLOOD PRESSURE: 104 MMHG | DIASTOLIC BLOOD PRESSURE: 72 MMHG | RESPIRATION RATE: 16 BRPM

## 2020-10-28 DIAGNOSIS — M25.552 HIP PAIN, BILATERAL: ICD-10-CM

## 2020-10-28 DIAGNOSIS — M25.551 HIP PAIN, BILATERAL: ICD-10-CM

## 2020-10-28 DIAGNOSIS — G57.01 PIRIFORMIS SYNDROME OF RIGHT SIDE: ICD-10-CM

## 2020-10-28 DIAGNOSIS — M51.16 DISPLACEMENT OF LUMBAR DISC WITH RADICULOPATHY: Primary | ICD-10-CM

## 2020-10-28 PROCEDURE — 73523 X-RAY EXAM HIPS BI 5/> VIEWS: CPT | Performed by: NEUROLOGICAL SURGERY

## 2020-10-28 PROCEDURE — 99024 POSTOP FOLLOW-UP VISIT: CPT | Performed by: NEUROLOGICAL SURGERY

## 2020-10-28 PROCEDURE — 3078F DIAST BP <80 MM HG: CPT | Performed by: NEUROLOGICAL SURGERY

## 2020-10-28 PROCEDURE — 3074F SYST BP LT 130 MM HG: CPT | Performed by: NEUROLOGICAL SURGERY

## 2020-10-28 RX ORDER — PREDNISONE 20 MG/1
TABLET ORAL
Qty: 22 TABLET | Refills: 0 | Status: SHIPPED | OUTPATIENT
Start: 2020-10-28 | End: 2020-11-11

## 2020-10-28 NOTE — PROGRESS NOTES
[facility]  Neurological Surgery Follow Up Clinic Note    Wanda Boyd 52year old, male    1970 MRN [id number]   PCP Christina Ghotra MD Allergies No Known Allergies     SUBJECTIVE:  Deborrah Divers is s/p left far left L4-5 discec syndrome. It has not responded to medications or physical therapy. I am going to give him a referral for the pain clinic for possible injections in this area. 3. For the current pain I am going to give him a prednisone taper over 14 days.     4.   If h

## 2020-10-28 NOTE — TELEPHONE ENCOUNTER
From: Charu Carlson  To: Levar Orta PA-C  Sent: 10/28/2020 2:41 PM CDT  Subject: Non-Urgent Medical Question    Dr Medina Sites -     I met with Dr Yuni Ibrahim today to follow up in the \"sciatic\" pain I continue to deal with on my right side.  He is s

## 2020-10-28 NOTE — PROGRESS NOTES
Pt here for post op visit. Pt c/o bilateral buttocks pain with shooting pain down right leg. 7/10 while standing.

## 2020-10-29 NOTE — TELEPHONE ENCOUNTER
Signed forms faxed back to [facility] @ [phone]. Confirmation received. Sent to scan.

## 2020-11-11 ENCOUNTER — HOSPITAL ENCOUNTER (OUTPATIENT)
Age: 50
Discharge: HOME | End: 2020-11-11
Payer: COMMERCIAL

## 2020-11-11 VITALS
HEIGHT: 71 IN | WEIGHT: 160 LBS | BODY MASS INDEX: 22.4 KG/M2 | DIASTOLIC BLOOD PRESSURE: 87 MMHG | RESPIRATION RATE: 16 BRPM | HEART RATE: 92 BPM | TEMPERATURE: 98 F | OXYGEN SATURATION: 96 % | SYSTOLIC BLOOD PRESSURE: 132 MMHG

## 2020-11-11 DIAGNOSIS — S61.411A LACERATION OF RIGHT HAND, FOREIGN BODY PRESENCE UNSPECIFIED, INITIAL ENCOUNTER: Primary | ICD-10-CM

## 2020-11-11 PROCEDURE — 99213 OFFICE O/P EST LOW 20 MIN: CPT | Performed by: PHYSICIAN ASSISTANT

## 2020-11-11 NOTE — ED PROVIDER NOTES
Patient Seen in: Immediate [address]      History   Patient presents with:  Laceration/Abrasion    Stated Complaint: hand laceration x1 day    HPI    CHIEF COMPLAINT: Left hand laceration    HISTORY OF PRESENT ILLNESS: Patient is a pleasant 4 Smoking status: Never Smoker      Smokeless tobacco: Never Used    Alcohol use: Not Currently    Drug use: Never             Review of Systems    Positive for stated complaint: hand laceration x1 day  Other systems are as noted in HPI.   Constitutional Patient was screened and evaluated during this visit. I determined, within reasonable clinical confidence and prior to discharge, that an emergency medical condition was not or was no longer present.   There was no indication for further evaluation, treat

## 2020-11-11 NOTE — ED INITIAL ASSESSMENT (HPI)
The patient is here for evaluation of a laceration to the left hand that occurred yesterday around 1430 when he was working with a utility knife, it slipped, and he cut the hand. Denies any numbness, tingling, and has full ROM.    Last tetanus was 5/2017

## 2020-12-17 NOTE — PAT NURSING NOTE
Called pt to schedule for Covid testing . Per pt, he is OOT and won't be able to be back until late Sunday 12/27 . LM with Ragena Blaze to work something out with pt before the procedure .

## 2020-12-19 ENCOUNTER — OFFICE VISIT (OUTPATIENT)
Dept: FAMILY MEDICINE CLINIC | Facility: CLINIC | Age: 50
End: 2020-12-19
Payer: COMMERCIAL

## 2020-12-19 VITALS
SYSTOLIC BLOOD PRESSURE: 120 MMHG | DIASTOLIC BLOOD PRESSURE: 78 MMHG | HEART RATE: 64 BPM | RESPIRATION RATE: 12 BRPM | BODY MASS INDEX: 22.4 KG/M2 | HEIGHT: 71 IN | WEIGHT: 160 LBS

## 2020-12-19 DIAGNOSIS — M54.16 LUMBAR BACK PAIN WITH RADICULOPATHY AFFECTING LOWER EXTREMITY: ICD-10-CM

## 2020-12-19 DIAGNOSIS — Z01.818 PRE-OP TESTING: ICD-10-CM

## 2020-12-19 DIAGNOSIS — Z01.818 PREOPERATIVE CLEARANCE: Primary | ICD-10-CM

## 2020-12-19 PROBLEM — Z90.81 POST-SPLENECTOMY: Status: ACTIVE | Noted: 2020-12-19

## 2020-12-19 PROCEDURE — 3078F DIAST BP <80 MM HG: CPT | Performed by: FAMILY MEDICINE

## 2020-12-19 PROCEDURE — 3074F SYST BP LT 130 MM HG: CPT | Performed by: FAMILY MEDICINE

## 2020-12-19 PROCEDURE — 3008F BODY MASS INDEX DOCD: CPT | Performed by: FAMILY MEDICINE

## 2020-12-19 PROCEDURE — 99242 OFF/OP CONSLTJ NEW/EST SF 20: CPT | Performed by: FAMILY MEDICINE

## 2020-12-19 NOTE — PATIENT INSTRUCTIONS
Check with Dr. Ting Spencer to ensure they are ok with chest xray and EKG done in September. Call surgeon - let him know you have no spleen and no recent Pneumovax. Does he want you to get a Pneumovax before surgery or wait until after surgery.

## 2020-12-19 NOTE — H&P
Alton Bautista is a 52year old male who presents for a pre-operative physical exam. Patient is to have right L4-5 hemilaminotomy with Dr. Drew Morel to be done at [facility] on 12/28/20. HPI:   Pt complains of ongoing back pain.   Surgery anemia  ENDOCRINE: denies thyroid history  ALL/ASTHMA: denies hx of asthma    EXAM:   /78   Pulse 64   Resp 12   Ht 5' 11\" (1.803 m)   Wt 160 lb (72.6 kg)   BMI 22.32 kg/m²   GENERAL: well developed, well nourished,in no apparent distress  SKIN: no

## 2020-12-21 ENCOUNTER — LAB ENCOUNTER (OUTPATIENT)
Dept: LAB | Facility: HOSPITAL | Age: 50
End: 2020-12-21
Attending: FAMILY MEDICINE
Payer: COMMERCIAL

## 2020-12-21 ENCOUNTER — TELEPHONE (OUTPATIENT)
Dept: FAMILY MEDICINE CLINIC | Facility: CLINIC | Age: 50
End: 2020-12-21

## 2020-12-21 DIAGNOSIS — Z01.818 PRE-OP TESTING: ICD-10-CM

## 2020-12-21 PROCEDURE — 81001 URINALYSIS AUTO W/SCOPE: CPT

## 2020-12-21 PROCEDURE — 87081 CULTURE SCREEN ONLY: CPT

## 2020-12-21 PROCEDURE — 85610 PROTHROMBIN TIME: CPT

## 2020-12-21 PROCEDURE — 80053 COMPREHEN METABOLIC PANEL: CPT

## 2020-12-21 PROCEDURE — 85025 COMPLETE CBC W/AUTO DIFF WBC: CPT

## 2020-12-21 PROCEDURE — 36415 COLL VENOUS BLD VENIPUNCTURE: CPT

## 2020-12-21 PROCEDURE — 85730 THROMBOPLASTIN TIME PARTIAL: CPT

## 2020-12-28 ENCOUNTER — APPOINTMENT (OUTPATIENT)
Dept: GENERAL RADIOLOGY | Facility: HOSPITAL | Age: 50
End: 2020-12-28
Attending: ORTHOPAEDIC SURGERY
Payer: COMMERCIAL

## 2020-12-28 ENCOUNTER — HOSPITAL ENCOUNTER (OUTPATIENT)
Facility: HOSPITAL | Age: 50
Setting detail: HOSPITAL OUTPATIENT SURGERY
Discharge: HOME | End: 2020-12-28
Attending: ORTHOPAEDIC SURGERY | Admitting: ORTHOPAEDIC SURGERY
Payer: COMMERCIAL

## 2020-12-28 ENCOUNTER — ANESTHESIA (OUTPATIENT)
Dept: SURGERY | Facility: HOSPITAL | Age: 50
End: 2020-12-28
Payer: COMMERCIAL

## 2020-12-28 ENCOUNTER — ANESTHESIA EVENT (OUTPATIENT)
Dept: SURGERY | Facility: HOSPITAL | Age: 50
End: 2020-12-28
Payer: COMMERCIAL

## 2020-12-28 VITALS
SYSTOLIC BLOOD PRESSURE: 132 MMHG | HEIGHT: 71 IN | OXYGEN SATURATION: 98 % | DIASTOLIC BLOOD PRESSURE: 80 MMHG | RESPIRATION RATE: 18 BRPM | BODY MASS INDEX: 23.05 KG/M2 | TEMPERATURE: 98 F | WEIGHT: 164.63 LBS | HEART RATE: 62 BPM

## 2020-12-28 DIAGNOSIS — Z01.818 PREOP TESTING: Primary | ICD-10-CM

## 2020-12-28 PROCEDURE — 00NY0ZZ RELEASE LUMBAR SPINAL CORD, OPEN APPROACH: ICD-10-PCS | Performed by: ORTHOPAEDIC SURGERY

## 2020-12-28 PROCEDURE — 76000 FLUOROSCOPY <1 HR PHYS/QHP: CPT | Performed by: ORTHOPAEDIC SURGERY

## 2020-12-28 PROCEDURE — 01NB0ZZ RELEASE LUMBAR NERVE, OPEN APPROACH: ICD-10-PCS | Performed by: ORTHOPAEDIC SURGERY

## 2020-12-28 RX ORDER — PROCHLORPERAZINE EDISYLATE 5 MG/ML
5 INJECTION INTRAMUSCULAR; INTRAVENOUS ONCE AS NEEDED
Status: DISCONTINUED | OUTPATIENT
Start: 2020-12-28 | End: 2020-12-28

## 2020-12-28 RX ORDER — EPHEDRINE SULFATE 50 MG/ML
INJECTION, SOLUTION INTRAVENOUS AS NEEDED
Status: DISCONTINUED | OUTPATIENT
Start: 2020-12-28 | End: 2020-12-28 | Stop reason: SURG

## 2020-12-28 RX ORDER — MORPHINE SULFATE 10 MG/ML
6 INJECTION, SOLUTION INTRAMUSCULAR; INTRAVENOUS EVERY 10 MIN PRN
Status: DISCONTINUED | OUTPATIENT
Start: 2020-12-28 | End: 2020-12-28

## 2020-12-28 RX ORDER — ONDANSETRON 2 MG/ML
INJECTION INTRAMUSCULAR; INTRAVENOUS AS NEEDED
Status: DISCONTINUED | OUTPATIENT
Start: 2020-12-28 | End: 2020-12-28 | Stop reason: SURG

## 2020-12-28 RX ORDER — HALOPERIDOL 5 MG/ML
0.25 INJECTION INTRAMUSCULAR ONCE AS NEEDED
Status: DISCONTINUED | OUTPATIENT
Start: 2020-12-28 | End: 2020-12-28

## 2020-12-28 RX ORDER — LIDOCAINE HYDROCHLORIDE 10 MG/ML
INJECTION, SOLUTION EPIDURAL; INFILTRATION; INTRACAUDAL; PERINEURAL AS NEEDED
Status: DISCONTINUED | OUTPATIENT
Start: 2020-12-28 | End: 2020-12-28 | Stop reason: SURG

## 2020-12-28 RX ORDER — ONDANSETRON 2 MG/ML
4 INJECTION INTRAMUSCULAR; INTRAVENOUS ONCE AS NEEDED
Status: DISCONTINUED | OUTPATIENT
Start: 2020-12-28 | End: 2020-12-28

## 2020-12-28 RX ORDER — HYDROMORPHONE HYDROCHLORIDE 1 MG/ML
0.4 INJECTION, SOLUTION INTRAMUSCULAR; INTRAVENOUS; SUBCUTANEOUS EVERY 5 MIN PRN
Status: DISCONTINUED | OUTPATIENT
Start: 2020-12-28 | End: 2020-12-28

## 2020-12-28 RX ORDER — NALOXONE HYDROCHLORIDE 0.4 MG/ML
80 INJECTION, SOLUTION INTRAMUSCULAR; INTRAVENOUS; SUBCUTANEOUS AS NEEDED
Status: DISCONTINUED | OUTPATIENT
Start: 2020-12-28 | End: 2020-12-28

## 2020-12-28 RX ORDER — SODIUM CHLORIDE, SODIUM LACTATE, POTASSIUM CHLORIDE, CALCIUM CHLORIDE 600; 310; 30; 20 MG/100ML; MG/100ML; MG/100ML; MG/100ML
INJECTION, SOLUTION INTRAVENOUS CONTINUOUS
Status: DISCONTINUED | OUTPATIENT
Start: 2020-12-28 | End: 2020-12-28

## 2020-12-28 RX ORDER — MORPHINE SULFATE 4 MG/ML
4 INJECTION, SOLUTION INTRAMUSCULAR; INTRAVENOUS EVERY 10 MIN PRN
Status: DISCONTINUED | OUTPATIENT
Start: 2020-12-28 | End: 2020-12-28

## 2020-12-28 RX ORDER — HYDROCODONE BITARTRATE AND ACETAMINOPHEN 5; 325 MG/1; MG/1
2 TABLET ORAL AS NEEDED
Status: DISCONTINUED | OUTPATIENT
Start: 2020-12-28 | End: 2020-12-28

## 2020-12-28 RX ORDER — CEFAZOLIN SODIUM/WATER 2 G/20 ML
2 SYRINGE (ML) INTRAVENOUS ONCE
Status: COMPLETED | OUTPATIENT
Start: 2020-12-28 | End: 2020-12-28

## 2020-12-28 RX ORDER — HYDROCODONE BITARTRATE AND ACETAMINOPHEN 5; 325 MG/1; MG/1
1 TABLET ORAL AS NEEDED
Status: DISCONTINUED | OUTPATIENT
Start: 2020-12-28 | End: 2020-12-28

## 2020-12-28 RX ORDER — ACETAMINOPHEN 500 MG
1000 TABLET ORAL ONCE
Status: COMPLETED | OUTPATIENT
Start: 2020-12-28 | End: 2020-12-28

## 2020-12-28 RX ORDER — ROCURONIUM BROMIDE 10 MG/ML
INJECTION, SOLUTION INTRAVENOUS AS NEEDED
Status: DISCONTINUED | OUTPATIENT
Start: 2020-12-28 | End: 2020-12-28 | Stop reason: SURG

## 2020-12-28 RX ORDER — DEXAMETHASONE SODIUM PHOSPHATE 4 MG/ML
VIAL (ML) INJECTION AS NEEDED
Status: DISCONTINUED | OUTPATIENT
Start: 2020-12-28 | End: 2020-12-28 | Stop reason: SURG

## 2020-12-28 RX ORDER — GLYCOPYRROLATE 0.2 MG/ML
INJECTION, SOLUTION INTRAMUSCULAR; INTRAVENOUS AS NEEDED
Status: DISCONTINUED | OUTPATIENT
Start: 2020-12-28 | End: 2020-12-28 | Stop reason: SURG

## 2020-12-28 RX ORDER — MIDAZOLAM HYDROCHLORIDE 1 MG/ML
INJECTION INTRAMUSCULAR; INTRAVENOUS AS NEEDED
Status: DISCONTINUED | OUTPATIENT
Start: 2020-12-28 | End: 2020-12-28 | Stop reason: SURG

## 2020-12-28 RX ORDER — HYDROMORPHONE HYDROCHLORIDE 1 MG/ML
0.6 INJECTION, SOLUTION INTRAMUSCULAR; INTRAVENOUS; SUBCUTANEOUS EVERY 5 MIN PRN
Status: DISCONTINUED | OUTPATIENT
Start: 2020-12-28 | End: 2020-12-28

## 2020-12-28 RX ORDER — HYDROMORPHONE HYDROCHLORIDE 1 MG/ML
0.2 INJECTION, SOLUTION INTRAMUSCULAR; INTRAVENOUS; SUBCUTANEOUS EVERY 5 MIN PRN
Status: DISCONTINUED | OUTPATIENT
Start: 2020-12-28 | End: 2020-12-28

## 2020-12-28 RX ORDER — MORPHINE SULFATE 4 MG/ML
2 INJECTION, SOLUTION INTRAMUSCULAR; INTRAVENOUS EVERY 10 MIN PRN
Status: DISCONTINUED | OUTPATIENT
Start: 2020-12-28 | End: 2020-12-28

## 2020-12-28 RX ADMIN — CEFAZOLIN SODIUM/WATER 2 G: 2 G/20 ML SYRINGE (ML) INTRAVENOUS at 11:24:00

## 2020-12-28 RX ADMIN — ROCURONIUM BROMIDE 40 MG: 10 INJECTION, SOLUTION INTRAVENOUS at 11:19:00

## 2020-12-28 RX ADMIN — MIDAZOLAM HYDROCHLORIDE 2 MG: 1 INJECTION INTRAMUSCULAR; INTRAVENOUS at 11:19:00

## 2020-12-28 RX ADMIN — SODIUM CHLORIDE, SODIUM LACTATE, POTASSIUM CHLORIDE, CALCIUM CHLORIDE: 600; 310; 30; 20 INJECTION, SOLUTION INTRAVENOUS at 12:00:00

## 2020-12-28 RX ADMIN — LIDOCAINE HYDROCHLORIDE 50 MG: 10 INJECTION, SOLUTION EPIDURAL; INFILTRATION; INTRACAUDAL; PERINEURAL at 11:19:00

## 2020-12-28 RX ADMIN — DEXAMETHASONE SODIUM PHOSPHATE 4 MG: 4 MG/ML VIAL (ML) INJECTION at 11:19:00

## 2020-12-28 RX ADMIN — DEXAMETHASONE SODIUM PHOSPHATE 6 MG: 4 MG/ML VIAL (ML) INJECTION at 12:19:00

## 2020-12-28 RX ADMIN — EPHEDRINE SULFATE 5 MG: 50 INJECTION, SOLUTION INTRAVENOUS at 11:53:00

## 2020-12-28 RX ADMIN — GLYCOPYRROLATE 0.2 MG: 0.2 INJECTION, SOLUTION INTRAMUSCULAR; INTRAVENOUS at 11:19:00

## 2020-12-28 RX ADMIN — ONDANSETRON 4 MG: 2 INJECTION INTRAMUSCULAR; INTRAVENOUS at 11:19:00

## 2020-12-28 NOTE — ANESTHESIA PREPROCEDURE EVALUATION
Anesthesia PreOp Note    HPI:     Kenney Jiang is a 52year old male who presents for preoperative consultation requested by: Aure Vernon MD    Date of Surgery: 12/28/2020    Procedure(s):  LUMBAR LAMINECTOMY 1 LEVEL  Indication: radiculopathy    R medications prior to admission.       •  lactated ringers infusion, , Intravenous, Continuous, Mary Bravo MD, Last Rate: 20 mL/hr at 12/28/20 1389    •  ceFAZolin sodium (ANCEF/KEFZOL) 2 GM/20ML premix IV syringe 2 g, 2 g, Intravenous, Once, Veronica Daniels  Service: Not Asked        Blood Transfusions: Not Asked        Occupational Exposure: Not Asked        Hobby Hazards: Not Asked        Sleep Concern: Not Asked        Back Care: Not Asked        Bike Helmet: Not Asked        Self-Exams: Not Asked anesthetic plan, benefits, risks including possible dental damage if relevant, major complications, and any alternative forms of anesthetic management. All of the patient's questions were answered to the best of my ability.  The patient desires the anesth

## 2020-12-28 NOTE — ANESTHESIA PROCEDURE NOTES
Airway  Date/Time: 12/28/2020 11:21 AM  Urgency: Elective    Airway not difficult    General Information and Staff    Patient location during procedure: OR  Anesthesiologist: Vidal Matthews MD  Resident/CRNA: Ashley Metcalf CRNA  Performed: CRNA     Ind

## 2020-12-28 NOTE — ANESTHESIA POSTPROCEDURE EVALUATION
Patient: Marianne Howard    Procedure Summary     Date: 12/28/20 Room / Location: [address] [place] [address] MAIN OR 02 / 300 [facility] MAIN OR    Anesthesia Start: 7281 Anesthesia Stop:     Procedure: LUMBAR LAMINECTOMY 1 LEVEL (Right Back) Diagnosis: (radiculopathy)    Surgeons: Nino Armando,

## 2020-12-28 NOTE — H&P
H&P update    No active problems    NKDA    HPI: 51 YO male with recurrent low back pain with current right sided radiculopathy S/P left sided microdiscectomy on 9/17/2020.     Objective: well appearing, no acute distress, alert and oriented x3, 4/5 EHL/ant

## 2020-12-30 NOTE — OPERATIVE REPORT
PATIENT  Solitario Carlson    DATE OF SURGERY  12/30/20    SURGEON   Margo Gamble MD    ASSISTANT  GIULIA Smith    PREOPERATIVE DIAGNOSIS   1. Right sided lateral recess stenosis RECURRENT L4-5  2. History of L4-5 diskectomy with outside surgeon  3.  Rig incision was made just off the   midline overlying the L4-5 interlaminar window. The incision   continued through fascia with Bovie dissection. Sequential   dilators were advanced into the caudal edge of the L4 lamina   under fluoroscopy.  A tubular retract

## 2020-12-31 ENCOUNTER — HOSPITAL ENCOUNTER (OUTPATIENT)
Age: 50
Discharge: HOME | End: 2020-12-31
Payer: COMMERCIAL

## 2020-12-31 VITALS
SYSTOLIC BLOOD PRESSURE: 121 MMHG | OXYGEN SATURATION: 96 % | WEIGHT: 160 LBS | HEART RATE: 80 BPM | RESPIRATION RATE: 18 BRPM | BODY MASS INDEX: 22.4 KG/M2 | DIASTOLIC BLOOD PRESSURE: 81 MMHG | HEIGHT: 71 IN | TEMPERATURE: 98 F

## 2020-12-31 DIAGNOSIS — Z20.822 EXPOSURE TO COVID-19 VIRUS: Primary | ICD-10-CM

## 2020-12-31 LAB — AMB EXT COVID-19 RESULT: DETECTED

## 2020-12-31 PROCEDURE — 99213 OFFICE O/P EST LOW 20 MIN: CPT | Performed by: NURSE PRACTITIONER

## 2020-12-31 RX ORDER — HYDROCODONE BITARTRATE AND ACETAMINOPHEN 10; 325 MG/1; MG/1
1 TABLET ORAL EVERY 6 HOURS PRN
COMMUNITY
End: 2021-06-04 | Stop reason: ALTCHOICE

## 2020-12-31 NOTE — ED PROVIDER NOTES
Patient Seen in: Immediate [address]      History   Patient presents with:  Testing    Stated Complaint: covid testing family of 7    HPI  Patient is 61-year-old male past medical history of sleep apnea presents for COVID testing.   Family mem 121/81   Pulse 80   Resp 18   Temp 98.3 °F (36.8 °C)   Temp src    SpO2 96 %   O2 Device None (Room air)       Current:/81   Pulse 80   Temp 98.3 °F (36.8 °C)   Resp 18   Ht 180.3 cm (5' 11\")   Wt 72.6 kg   SpO2 96%   BMI 22.32 kg/m²         Physica

## 2020-12-31 NOTE — ED INITIAL ASSESSMENT (HPI)
Pt here for COVID19 testing after exposure to family x 1 week ago who tested positive for COVID-19 over the past 24 hrs. Pt denies having any s/s.

## 2021-01-02 LAB — SARS-COV-2 BY PCR: DETECTED

## 2021-01-04 ENCOUNTER — HOSPITAL ENCOUNTER (EMERGENCY)
Facility: HOSPITAL | Age: 51
Discharge: HOME | End: 2021-01-04
Attending: EMERGENCY MEDICINE
Payer: COMMERCIAL

## 2021-01-04 VITALS
RESPIRATION RATE: 14 BRPM | SYSTOLIC BLOOD PRESSURE: 140 MMHG | HEIGHT: 71 IN | DIASTOLIC BLOOD PRESSURE: 100 MMHG | HEART RATE: 84 BPM | OXYGEN SATURATION: 97 % | WEIGHT: 160 LBS | BODY MASS INDEX: 22.4 KG/M2 | TEMPERATURE: 98 F

## 2021-01-04 DIAGNOSIS — F32.A DEPRESSION, UNSPECIFIED DEPRESSION TYPE: Primary | ICD-10-CM

## 2021-01-04 PROCEDURE — 99284 EMERGENCY DEPT VISIT MOD MDM: CPT

## 2021-01-04 PROCEDURE — 99285 EMERGENCY DEPT VISIT HI MDM: CPT

## 2021-01-04 NOTE — ED INITIAL ASSESSMENT (HPI)
Patient is COVID + 12/31 with chills and fever at onset. Denies covid symptoms now. Pt here for eval of suicidal thoughts. Tearful. Reports increased stress and marital issues. No plan or attempt.

## 2021-01-05 NOTE — ED PROVIDER NOTES
Patient Seen in: [facility] Emergency Department      History   Patient presents with:  Emmanuelle-BILLY Coleyid    Stated Complaint: SI    HPI/Subjective:   HPI    Patient is a pleasant 43-year-old male who arrives for evaluation with complaints of suicidal i HPI.  Constitutional and vital signs reviewed. All other systems reviewed and negative except as noted above.     Physical Exam     ED Triage Vitals [01/04/21 1521]   /89   Pulse 79   Resp 16   Temp 97.9 °F (36.6 °C)   Temp src Temporal   SpO2 97 subsequently discharged without incident.                  Disposition and Plan     Clinical Impression:  Depression, unspecified depression type  (primary encounter diagnosis)    Disposition:  Discharge  1/4/2021  9:33 pm    Follow-up:  Radha Arellano

## 2021-01-05 NOTE — BH LEVEL OF CARE ASSESSMENT
Crisis Evaluation Assessment    Kentrell Bell YOB: 1970   Age 52year old MRN [id number]   Location [address] [facility] Attending Cr Sheppard MD      Patient's legal sex: male  Patient identifies as: male  Patient's do anything, or prepared to do anything to end your life? (lifetime): No     Score -  OV: 2- Low Risk   Is your experience of thoughts of dying by suicide: Frightening  Protective Factors: \"that's what I'm trying to figure out. \"  Past Suicidal Ideation antidepressants while going through a divorce but none currently. Pt reported having a marriage counselor. Pt denied having an individual therapist or psychiatrist. Pt denied any history of hospitalization. Pt denied any history of IOP/PHP.  Pt reported kiana completed via telephone due to pt's covid positive status. Pt reported that he has been having suicidal ideation throughout the day. He reported difficulty focusing on anything else.  Pt reported he typically yuli with stress by talking to his wife however

## 2021-01-07 ENCOUNTER — TELEMEDICINE (OUTPATIENT)
Dept: FAMILY MEDICINE CLINIC | Facility: CLINIC | Age: 51
End: 2021-01-07
Payer: COMMERCIAL

## 2021-01-07 DIAGNOSIS — F32.A DEPRESSION, UNSPECIFIED DEPRESSION TYPE: Primary | ICD-10-CM

## 2021-01-07 DIAGNOSIS — U07.1 COVID-19: ICD-10-CM

## 2021-01-07 PROCEDURE — 99214 OFFICE O/P EST MOD 30 MIN: CPT | Performed by: FAMILY MEDICINE

## 2021-01-07 RX ORDER — BUPROPION HYDROCHLORIDE 150 MG/1
150 TABLET ORAL DAILY
Qty: 30 TABLET | Refills: 1 | Status: SHIPPED | OUTPATIENT
Start: 2021-01-07 | End: 2021-02-02

## 2021-01-07 NOTE — PROGRESS NOTES
Glenna Marroquin is a 52year old male. CHIEF COMPLAINT   Depression and Covid  HPI:   This visit is conducted using Telemedicine with live, interactive video and audio.  Patient understands and accepts financial responsibility for any deductible, co-insur glasses      Social History:  Social History    Tobacco Use      Smoking status: Never Smoker      Smokeless tobacco: Never Used    Alcohol use: Not Currently    Drug use: Never       REVIEW OF SYSTEMS:   GENERAL HEALTH: as above    EXAM:   There were no v

## 2021-02-02 RX ORDER — BUPROPION HYDROCHLORIDE 150 MG/1
TABLET ORAL
Qty: 30 TABLET | Refills: 1 | Status: SHIPPED | OUTPATIENT
Start: 2021-02-02 | End: 2021-03-02

## 2021-03-02 ENCOUNTER — TELEMEDICINE (OUTPATIENT)
Dept: FAMILY MEDICINE CLINIC | Facility: CLINIC | Age: 51
End: 2021-03-02

## 2021-03-02 DIAGNOSIS — F32.A DEPRESSION, UNSPECIFIED DEPRESSION TYPE: Primary | ICD-10-CM

## 2021-03-02 PROCEDURE — 99214 OFFICE O/P EST MOD 30 MIN: CPT | Performed by: FAMILY MEDICINE

## 2021-03-02 RX ORDER — BUPROPION HYDROCHLORIDE 300 MG/1
300 TABLET ORAL DAILY
Qty: 30 TABLET | Refills: 1 | Status: SHIPPED | OUTPATIENT
Start: 2021-03-02 | End: 2021-04-28

## 2021-03-02 RX ORDER — BUPROPION HYDROCHLORIDE 150 MG/1
TABLET ORAL
Qty: 30 TABLET | Refills: 1 | OUTPATIENT
Start: 2021-03-02

## 2021-03-02 NOTE — PROGRESS NOTES
Marianne Howard is a 52year old male.   CHIEF COMPLAINT   Follow up on depression  HPI:   This visit is conducted using Telemedicine with live, interactive video and audio. Patient understands and accepts financial responsibility for any deductible, co-in developed, well nourished,in no apparent distress  SKIN: no rashes,no suspicious lesions  PSYCH:  Normal affect. Normal mood.    10:08-10:32 - video visit - 24 minutes  5:31-5:39 - charting - 8 minutes  Total time today:  32 minutes  ASSESSMENT AND PLAN:

## 2021-03-24 RX ORDER — BUPROPION HYDROCHLORIDE 300 MG/1
TABLET ORAL
Qty: 30 TABLET | Refills: 1 | OUTPATIENT
Start: 2021-03-24

## 2021-04-05 RX ORDER — BUPROPION HYDROCHLORIDE 300 MG/1
300 TABLET ORAL DAILY
Qty: 30 TABLET | Refills: 1 | OUTPATIENT
Start: 2021-04-05

## 2021-04-12 ENCOUNTER — HOSPITAL ENCOUNTER (OUTPATIENT)
Age: 51
Discharge: ANOTHER HOSPITAL | End: 2021-04-12
Payer: COMMERCIAL

## 2021-04-12 ENCOUNTER — HOSPITAL ENCOUNTER (EMERGENCY)
Facility: HOSPITAL | Age: 51
Discharge: LEFT BEFORE DISCHARGE | End: 2021-04-12
Payer: COMMERCIAL

## 2021-04-12 VITALS
SYSTOLIC BLOOD PRESSURE: 161 MMHG | TEMPERATURE: 98 F | HEART RATE: 61 BPM | DIASTOLIC BLOOD PRESSURE: 98 MMHG | WEIGHT: 170 LBS | RESPIRATION RATE: 22 BRPM | HEIGHT: 70 IN | OXYGEN SATURATION: 98 % | BODY MASS INDEX: 24.34 KG/M2

## 2021-04-12 VITALS
HEIGHT: 70 IN | TEMPERATURE: 98 F | RESPIRATION RATE: 20 BRPM | DIASTOLIC BLOOD PRESSURE: 92 MMHG | HEART RATE: 74 BPM | BODY MASS INDEX: 22.9 KG/M2 | OXYGEN SATURATION: 97 % | SYSTOLIC BLOOD PRESSURE: 147 MMHG | WEIGHT: 160 LBS

## 2021-04-12 DIAGNOSIS — R10.9 ABDOMINAL PAIN, ACUTE: Primary | ICD-10-CM

## 2021-04-12 DIAGNOSIS — R14.0 BLOATING: ICD-10-CM

## 2021-04-12 PROCEDURE — 99214 OFFICE O/P EST MOD 30 MIN: CPT | Performed by: PHYSICIAN ASSISTANT

## 2021-04-12 NOTE — ED INITIAL ASSESSMENT (HPI)
Patient with generalized abdominal pain since last night. Patient states it came out of no where and is worsening. Per patient \"it feels like my stomach is bloated. It feels like there is a traffic jam and a lot of gurgling. \"

## 2021-04-12 NOTE — ED INITIAL ASSESSMENT (HPI)
Woke w severe abd pain, bloating @ 1130 last noc. Normal BM yesterday. Not belching or passing gas. Describes as tightness to mid abd. Denies UTI s/s. No relief w miralx last noc.
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
## 2021-04-14 NOTE — ED NOTES
Follow up call to check on patient. Patient seen @ IC on 4/12/21 sent to ED for for severe abd pain but signed out AMA after having BM. Message left to call IC as courtesy follow-up.

## 2021-04-18 ENCOUNTER — IMMUNIZATION (OUTPATIENT)
Dept: LAB | Facility: HOSPITAL | Age: 51
End: 2021-04-18
Attending: EMERGENCY MEDICINE
Payer: COMMERCIAL

## 2021-04-18 DIAGNOSIS — Z23 NEED FOR VACCINATION: Primary | ICD-10-CM

## 2021-04-18 PROCEDURE — 0011A SARSCOV2 VAC 100MCG/0.5ML IM: CPT

## 2021-04-28 RX ORDER — BUPROPION HYDROCHLORIDE 300 MG/1
TABLET ORAL
Qty: 30 TABLET | Refills: 1 | Status: SHIPPED | OUTPATIENT
Start: 2021-04-28 | End: 2021-05-24

## 2021-04-30 NOTE — PROGRESS NOTES
HPI/Subjective: Leroy Elena is a 52year old male who presents for Follow - Up     Patient is here for follow-up after his recent testing for ADHD.   Testing did bring out that he likely he has adult ADHD along with his previously known depression/an Attention deficit disorder (ADD) in adult    -  Primary    Relevant Medications    Amphetamine-Dextroamphet ER (ADDERALL XR) 5 MG Oral Capsule SR 24 Hr      Patient should update me approximately 10 to 14 days after starting the Adderall  Time: 30 minutes

## 2021-05-06 ENCOUNTER — TELEPHONE (OUTPATIENT)
Dept: FAMILY MEDICINE CLINIC | Facility: CLINIC | Age: 51
End: 2021-05-06

## 2021-05-06 NOTE — TELEPHONE ENCOUNTER
Called [facility] and spoke with Delfina Brown. He advise to call [facility] back and provide them with a code that will not trigger PA ([id number]). Caleed to [facility] and used the code and per Pharmacist it went through.   Per [facility] per pt's insurance prefers the br

## 2021-05-06 NOTE — TELEPHONE ENCOUNTER
Pt states that he called his insurance [employer] in [facility] to the prior authorization for Amphetamine-Dextroamphet ER (ADDERALL XR) 5 MG Oral Capsule SR 24 Hr.     Pt states that when he called his insurance, he was transferred to [facility] which told him

## 2021-05-11 ENCOUNTER — MED REC SCAN ONLY (OUTPATIENT)
Dept: FAMILY MEDICINE CLINIC | Facility: CLINIC | Age: 51
End: 2021-05-11

## 2021-05-17 ENCOUNTER — IMMUNIZATION (OUTPATIENT)
Dept: LAB | Facility: HOSPITAL | Age: 51
End: 2021-05-17
Attending: EMERGENCY MEDICINE
Payer: COMMERCIAL

## 2021-05-17 DIAGNOSIS — Z23 NEED FOR VACCINATION: Primary | ICD-10-CM

## 2021-05-17 PROCEDURE — 0012A SARSCOV2 VAC 100MCG/0.5ML IM: CPT

## 2021-05-24 RX ORDER — BUPROPION HYDROCHLORIDE 300 MG/1
TABLET ORAL
Qty: 30 TABLET | Refills: 1 | Status: SHIPPED | OUTPATIENT
Start: 2021-05-24 | End: 2021-06-21

## 2021-05-28 NOTE — TELEPHONE ENCOUNTER
From: Kentrell Bell  To: Petra Suárez MD  Sent: 5/28/2021 1:40 PM CDT  Subject: Prescription Question    Hi Dr. Dionna Rosario been on the Adderall (5mg) for a few weeks now and I think I feel some mild improvement in focus, concentration, and

## 2021-06-04 NOTE — PROGRESS NOTES
HPI/Subjective: Leroy Elena is a 52year old male who presents for ADD and Depression     Patient is here for follow-up of his ADHD.   His recent testing did bring out that he likely he has adult ADHD along with his previously known depression/anxiet

## 2021-06-14 ENCOUNTER — PATIENT MESSAGE (OUTPATIENT)
Dept: FAMILY MEDICINE CLINIC | Facility: CLINIC | Age: 51
End: 2021-06-14

## 2021-06-16 RX ORDER — DEXTROAMPHETAMINE SACCHARATE, AMPHETAMINE ASPARTATE MONOHYDRATE, DEXTROAMPHETAMINE SULFATE AND AMPHETAMINE SULFATE 3.75; 3.75; 3.75; 3.75 MG/1; MG/1; MG/1; MG/1
15 CAPSULE, EXTENDED RELEASE ORAL EVERY MORNING
Qty: 30 CAPSULE | Refills: 0 | Status: SHIPPED | OUTPATIENT
Start: 2021-06-16 | End: 2021-07-16

## 2021-06-16 NOTE — TELEPHONE ENCOUNTER
Script pended for 15mg XR for approval    Last office visit on 6/4/2021  Next office visit due on or around 9/4/2021 for folllow up  No future appointments.

## 2021-06-16 NOTE — TELEPHONE ENCOUNTER
From: Nohemy Calvo  To: Aarti Oconnor MD  Sent: 6/14/2021 11:43 AM CDT  Subject: Prescription Question    Hi Dr. Juli Vera,    As suggested in our last visit I have combined my Aderall 5mg and 10mg over the last week to see if I get better results

## 2021-06-18 ENCOUNTER — PATIENT MESSAGE (OUTPATIENT)
Dept: FAMILY MEDICINE CLINIC | Facility: CLINIC | Age: 51
End: 2021-06-18

## 2021-06-21 RX ORDER — BUPROPION HYDROCHLORIDE 300 MG/1
TABLET ORAL
Qty: 30 TABLET | Refills: 0 | Status: SHIPPED | OUTPATIENT
Start: 2021-06-21 | End: 2021-07-09

## 2021-06-21 NOTE — TELEPHONE ENCOUNTER
From: Ronda Kramer  To: Jimena Bright MD  Sent: 6/18/2021 1:12 PM CDT  Subject: Visit Yasmin Syed Led -     I think it was at our first visit that you recommended a therapist experienced in ADD/ADHD patients.  I can't remembe

## 2021-06-21 NOTE — TELEPHONE ENCOUNTER
Last vs 3.2 for depression. No notes seen to return.   Saw Dr Jolynn Hayden 6.4 for add  His notes:  Patient will return in 3 months

## 2021-07-09 RX ORDER — BUPROPION HYDROCHLORIDE 300 MG/1
300 TABLET ORAL DAILY
Qty: 90 TABLET | Refills: 0 | Status: SHIPPED | OUTPATIENT
Start: 2021-07-09 | End: 2021-12-30

## 2021-07-14 RX ORDER — BUPROPION HYDROCHLORIDE 300 MG/1
TABLET ORAL
Qty: 30 TABLET | Refills: 0 | OUTPATIENT
Start: 2021-07-14

## 2021-08-26 ENCOUNTER — PATIENT MESSAGE (OUTPATIENT)
Dept: FAMILY MEDICINE CLINIC | Facility: CLINIC | Age: 51
End: 2021-08-26

## 2021-08-27 NOTE — TELEPHONE ENCOUNTER
From: Anabelle Kyle  To: Missy Feliciano MD  Sent: 8/26/2021 12:17 PM CDT  Subject: Prescription Question    Hi Dr. Viva Seip -     I've been at the 25mg dose of Adderall for a few weeks with moderate improvement in task-oriented mental endurance and

## 2021-08-30 RX ORDER — DEXTROAMPHETAMINE SACCHARATE, AMPHETAMINE ASPARTATE MONOHYDRATE, DEXTROAMPHETAMINE SULFATE AND AMPHETAMINE SULFATE 7.5; 7.5; 7.5; 7.5 MG/1; MG/1; MG/1; MG/1
30 CAPSULE, EXTENDED RELEASE ORAL EVERY MORNING
Qty: 30 CAPSULE | Refills: 0 | Status: SHIPPED | OUTPATIENT
Start: 2021-08-30 | End: 2021-09-29

## 2021-10-25 ENCOUNTER — PATIENT MESSAGE (OUTPATIENT)
Dept: FAMILY MEDICINE CLINIC | Facility: CLINIC | Age: 51
End: 2021-10-25

## 2021-10-26 RX ORDER — DEXTROAMPHETAMINE SACCHARATE, AMPHETAMINE ASPARTATE MONOHYDRATE, DEXTROAMPHETAMINE SULFATE AND AMPHETAMINE SULFATE 7.5; 7.5; 7.5; 7.5 MG/1; MG/1; MG/1; MG/1
30 CAPSULE, EXTENDED RELEASE ORAL DAILY
Qty: 30 CAPSULE | Refills: 0 | Status: SHIPPED | OUTPATIENT
Start: 2021-12-27 | End: 2022-01-26

## 2021-10-26 RX ORDER — DEXTROAMPHETAMINE SACCHARATE, AMPHETAMINE ASPARTATE MONOHYDRATE, DEXTROAMPHETAMINE SULFATE AND AMPHETAMINE SULFATE 7.5; 7.5; 7.5; 7.5 MG/1; MG/1; MG/1; MG/1
30 CAPSULE, EXTENDED RELEASE ORAL DAILY
Qty: 30 CAPSULE | Refills: 0 | Status: SHIPPED | OUTPATIENT
Start: 2021-10-26 | End: 2021-11-25

## 2021-10-26 RX ORDER — DEXTROAMPHETAMINE SACCHARATE, AMPHETAMINE ASPARTATE MONOHYDRATE, DEXTROAMPHETAMINE SULFATE AND AMPHETAMINE SULFATE 7.5; 7.5; 7.5; 7.5 MG/1; MG/1; MG/1; MG/1
30 CAPSULE, EXTENDED RELEASE ORAL DAILY
Qty: 30 CAPSULE | Refills: 0 | Status: SHIPPED | OUTPATIENT
Start: 2021-11-26 | End: 2021-12-26

## 2021-10-26 NOTE — TELEPHONE ENCOUNTER
Last refill 8/30/2021-looks like increased to this last my chart message, you said however at last visit see you in Sept.and he still has no appointment  Quantity 30  LOV 6/4

## 2021-10-26 NOTE — TELEPHONE ENCOUNTER
From: Alka Castillo  To: Amado Love MD  Sent: 10/25/2021 9:22 AM CDT  Subject: Prescription renewal    Hello -     I need to have another prescription sent in to my pharmacy ([facility], [address]) for 30mg Adderall.  I ran out this past weeken

## 2021-12-30 RX ORDER — BUPROPION HYDROCHLORIDE 300 MG/1
300 TABLET ORAL DAILY
Qty: 90 TABLET | Refills: 0 | Status: SHIPPED | OUTPATIENT
Start: 2021-12-30

## 2022-01-04 ENCOUNTER — IMMUNIZATION (OUTPATIENT)
Dept: LAB | Facility: HOSPITAL | Age: 52
End: 2022-01-04
Attending: EMERGENCY MEDICINE
Payer: COMMERCIAL

## 2022-01-04 DIAGNOSIS — Z23 NEED FOR VACCINATION: Primary | ICD-10-CM

## 2022-01-04 PROCEDURE — 0064A SARSCOV2 VAC 50MCG/0.25ML IM: CPT

## 2022-02-10 ENCOUNTER — PATIENT MESSAGE (OUTPATIENT)
Dept: FAMILY MEDICINE CLINIC | Facility: CLINIC | Age: 52
End: 2022-02-10

## 2022-02-10 RX ORDER — DEXTROAMPHETAMINE SACCHARATE, AMPHETAMINE ASPARTATE MONOHYDRATE, DEXTROAMPHETAMINE SULFATE AND AMPHETAMINE SULFATE 7.5; 7.5; 7.5; 7.5 MG/1; MG/1; MG/1; MG/1
30 CAPSULE, EXTENDED RELEASE ORAL DAILY
Qty: 30 CAPSULE | Refills: 0 | Status: SHIPPED | OUTPATIENT
Start: 2022-03-13 | End: 2022-04-12

## 2022-02-10 RX ORDER — DEXTROAMPHETAMINE SACCHARATE, AMPHETAMINE ASPARTATE MONOHYDRATE, DEXTROAMPHETAMINE SULFATE AND AMPHETAMINE SULFATE 7.5; 7.5; 7.5; 7.5 MG/1; MG/1; MG/1; MG/1
30 CAPSULE, EXTENDED RELEASE ORAL DAILY
Qty: 30 CAPSULE | Refills: 0 | Status: SHIPPED | OUTPATIENT
Start: 2022-04-13 | End: 2022-05-13

## 2022-02-10 RX ORDER — DEXTROAMPHETAMINE SACCHARATE, AMPHETAMINE ASPARTATE MONOHYDRATE, DEXTROAMPHETAMINE SULFATE AND AMPHETAMINE SULFATE 7.5; 7.5; 7.5; 7.5 MG/1; MG/1; MG/1; MG/1
30 CAPSULE, EXTENDED RELEASE ORAL DAILY
Qty: 30 CAPSULE | Refills: 0 | Status: SHIPPED | OUTPATIENT
Start: 2022-02-10 | End: 2022-03-12

## 2022-02-10 NOTE — TELEPHONE ENCOUNTER
From: Crystal Rodriguez  To: Milton Lira MD  Sent: 2/10/2022 1:54 PM CST  Subject: Prescription Renewal    Hello,    At my last visit Dr. Vicente Lockett said he was going to call in 3 refills of 30 MG ADDERALL XR to my pharmacy. [facility] has not yet received the prescription and I'll be out of Adderall this weekend. Please send in the refill request as soon as possible.     Thanks

## 2022-02-10 NOTE — TELEPHONE ENCOUNTER
Last OV 1/31/2022 for ADD    Last refill 12/27/2021 #30 without refills. Please see pended RXs and approve if appropriate. Thank you.

## 2022-02-28 RX ORDER — BUPROPION HYDROCHLORIDE 300 MG/1
TABLET ORAL
Qty: 90 TABLET | Refills: 0 | Status: SHIPPED | OUTPATIENT
Start: 2022-02-28

## 2022-02-28 NOTE — TELEPHONE ENCOUNTER
LOV: 1/31/22 (ADHD)  Last Refill:  12/3021, #90, 0 RF   Next OV: N/A    Please authorize if acceptable. Thank you!

## 2022-03-08 ENCOUNTER — OFFICE VISIT (OUTPATIENT)
Dept: FAMILY MEDICINE CLINIC | Facility: CLINIC | Age: 52
End: 2022-03-08
Payer: COMMERCIAL

## 2022-03-08 VITALS
TEMPERATURE: 98 F | HEART RATE: 84 BPM | SYSTOLIC BLOOD PRESSURE: 122 MMHG | HEIGHT: 70 IN | WEIGHT: 162 LBS | RESPIRATION RATE: 16 BRPM | DIASTOLIC BLOOD PRESSURE: 80 MMHG | BODY MASS INDEX: 23.19 KG/M2

## 2022-03-08 DIAGNOSIS — G43.109 MIGRAINE WITH AURA AND WITHOUT STATUS MIGRAINOSUS, NOT INTRACTABLE: Primary | ICD-10-CM

## 2022-03-08 PROCEDURE — 3008F BODY MASS INDEX DOCD: CPT | Performed by: FAMILY MEDICINE

## 2022-03-08 PROCEDURE — 3074F SYST BP LT 130 MM HG: CPT | Performed by: FAMILY MEDICINE

## 2022-03-08 PROCEDURE — 99214 OFFICE O/P EST MOD 30 MIN: CPT | Performed by: FAMILY MEDICINE

## 2022-03-08 PROCEDURE — 3079F DIAST BP 80-89 MM HG: CPT | Performed by: FAMILY MEDICINE

## 2022-03-08 RX ORDER — ELETRIPTAN HYDROBROMIDE 40 MG/1
40 TABLET, FILM COATED ORAL AS NEEDED
Qty: 9 TABLET | Refills: 1 | Status: SHIPPED | OUTPATIENT
Start: 2022-03-08

## 2022-04-27 RX ORDER — ELETRIPTAN HYDROBROMIDE 40 MG/1
40 TABLET, FILM COATED ORAL AS NEEDED
Qty: 6 TABLET | Refills: 0 | Status: SHIPPED | OUTPATIENT
Start: 2022-04-27

## 2022-05-03 ENCOUNTER — PATIENT MESSAGE (OUTPATIENT)
Dept: FAMILY MEDICINE CLINIC | Facility: CLINIC | Age: 52
End: 2022-05-03

## 2022-05-12 RX ORDER — DEXTROAMPHETAMINE SACCHARATE, AMPHETAMINE ASPARTATE MONOHYDRATE, DEXTROAMPHETAMINE SULFATE AND AMPHETAMINE SULFATE 7.5; 7.5; 7.5; 7.5 MG/1; MG/1; MG/1; MG/1
30 CAPSULE, EXTENDED RELEASE ORAL DAILY
Qty: 30 CAPSULE | Refills: 0 | Status: SHIPPED | OUTPATIENT
Start: 2022-07-13 | End: 2022-08-12

## 2022-05-12 RX ORDER — DEXTROAMPHETAMINE SACCHARATE, AMPHETAMINE ASPARTATE MONOHYDRATE, DEXTROAMPHETAMINE SULFATE AND AMPHETAMINE SULFATE 7.5; 7.5; 7.5; 7.5 MG/1; MG/1; MG/1; MG/1
30 CAPSULE, EXTENDED RELEASE ORAL DAILY
Qty: 30 CAPSULE | Refills: 0 | Status: SHIPPED | OUTPATIENT
Start: 2022-05-12 | End: 2022-06-11

## 2022-05-12 RX ORDER — DEXTROAMPHETAMINE SACCHARATE, AMPHETAMINE ASPARTATE MONOHYDRATE, DEXTROAMPHETAMINE SULFATE AND AMPHETAMINE SULFATE 7.5; 7.5; 7.5; 7.5 MG/1; MG/1; MG/1; MG/1
30 CAPSULE, EXTENDED RELEASE ORAL DAILY
Qty: 30 CAPSULE | Refills: 0 | Status: SHIPPED | OUTPATIENT
Start: 2022-06-12 | End: 2022-07-12

## 2022-05-12 NOTE — TELEPHONE ENCOUNTER
From: Crystal Rodriguez  To: Milton Lira MD  Sent: 5/3/2022 11:57 AM CDT  Subject: Adderall refill    Dr. Vicente Lockett -     My pharmacy, [facility] has not had Adderall in stock for the past 3 weeks and I ran out 2 weeks ago. They say that there is a back order on the medication and don't know when they will get their next shipment. Is there another option for filling the prescription?

## 2022-05-12 NOTE — TELEPHONE ENCOUNTER
LOV: 03/08/2022--acute     NOV: 05/23/2022--follow up     LF: 04/13/2022      Order pended for Adderall XR 30mg daily, 90-day supply to [facility], [place], [address]

## 2022-05-23 ENCOUNTER — OFFICE VISIT (OUTPATIENT)
Dept: FAMILY MEDICINE CLINIC | Facility: CLINIC | Age: 52
End: 2022-05-23
Payer: COMMERCIAL

## 2022-05-23 VITALS
TEMPERATURE: 98 F | HEIGHT: 70 IN | RESPIRATION RATE: 15 BRPM | DIASTOLIC BLOOD PRESSURE: 76 MMHG | WEIGHT: 164 LBS | HEART RATE: 84 BPM | SYSTOLIC BLOOD PRESSURE: 118 MMHG | BODY MASS INDEX: 23.48 KG/M2

## 2022-05-23 DIAGNOSIS — Z12.12 SCREENING FOR COLORECTAL CANCER: ICD-10-CM

## 2022-05-23 DIAGNOSIS — Z12.5 SCREENING FOR PROSTATE CANCER: ICD-10-CM

## 2022-05-23 DIAGNOSIS — Z12.11 SCREENING FOR COLORECTAL CANCER: ICD-10-CM

## 2022-05-23 DIAGNOSIS — Z13.220 SCREENING FOR LIPID DISORDERS: ICD-10-CM

## 2022-05-23 DIAGNOSIS — G43.109 MIGRAINE WITH AURA AND WITHOUT STATUS MIGRAINOSUS, NOT INTRACTABLE: Primary | ICD-10-CM

## 2022-05-23 DIAGNOSIS — Z13.1 SCREENING FOR DIABETES MELLITUS: ICD-10-CM

## 2022-05-23 DIAGNOSIS — Z13.0 SCREENING FOR DEFICIENCY ANEMIA: ICD-10-CM

## 2022-05-23 PROCEDURE — 3078F DIAST BP <80 MM HG: CPT | Performed by: FAMILY MEDICINE

## 2022-05-23 PROCEDURE — 3008F BODY MASS INDEX DOCD: CPT | Performed by: FAMILY MEDICINE

## 2022-05-23 PROCEDURE — 99214 OFFICE O/P EST MOD 30 MIN: CPT | Performed by: FAMILY MEDICINE

## 2022-05-23 PROCEDURE — 3074F SYST BP LT 130 MM HG: CPT | Performed by: FAMILY MEDICINE

## 2022-05-23 RX ORDER — TOPIRAMATE 25 MG/1
TABLET ORAL
Qty: 70 TABLET | Refills: 0 | Status: SHIPPED | OUTPATIENT
Start: 2022-05-23

## 2022-05-25 RX ORDER — TOPIRAMATE 25 MG/1
TABLET ORAL
Qty: 70 TABLET | Refills: 0 | OUTPATIENT
Start: 2022-05-25

## 2022-06-03 RX ORDER — ELETRIPTAN HYDROBROMIDE 40 MG/1
40 TABLET, FILM COATED ORAL AS NEEDED
Qty: 6 TABLET | Refills: 0 | Status: SHIPPED | OUTPATIENT
Start: 2022-06-03

## 2022-06-03 NOTE — TELEPHONE ENCOUNTER
LOV: 5/23/22 (follow up on migraines)   Last Refill: 4/27/22, #6, 0 RF  Next OV: n/a    Please authorize if acceptable. Thank you!

## 2022-06-08 ENCOUNTER — PATIENT MESSAGE (OUTPATIENT)
Dept: FAMILY MEDICINE CLINIC | Facility: CLINIC | Age: 52
End: 2022-06-08

## 2022-06-09 ENCOUNTER — PATIENT MESSAGE (OUTPATIENT)
Dept: FAMILY MEDICINE CLINIC | Facility: CLINIC | Age: 52
End: 2022-06-09

## 2022-06-09 NOTE — TELEPHONE ENCOUNTER
How did he feel when he was taking 25 mg twice daily? If he felt ok at the lower dose, we could leave him at 25 mg twice daily for 3 months to see if this helps with the headaches.

## 2022-06-09 NOTE — TELEPHONE ENCOUNTER
If he is comfortable with this, we could have him decrease to 25mg once daily for a month to see if this will help migraines. Even at lower dosages, can help with migraines for some folks. Other option would be to change medication.

## 2022-06-29 RX ORDER — DEXTROAMPHETAMINE SACCHARATE, AMPHETAMINE ASPARTATE MONOHYDRATE, DEXTROAMPHETAMINE SULFATE AND AMPHETAMINE SULFATE 7.5; 7.5; 7.5; 7.5 MG/1; MG/1; MG/1; MG/1
30 CAPSULE, EXTENDED RELEASE ORAL DAILY
Qty: 30 CAPSULE | Refills: 0 | Status: CANCELLED | OUTPATIENT
Start: 2022-06-29 | End: 2022-07-29

## 2022-06-29 RX ORDER — ELETRIPTAN HYDROBROMIDE 40 MG/1
40 TABLET, FILM COATED ORAL AS NEEDED
Qty: 6 TABLET | Refills: 0 | Status: SHIPPED | OUTPATIENT
Start: 2022-06-29

## 2022-07-11 ENCOUNTER — PATIENT MESSAGE (OUTPATIENT)
Dept: FAMILY MEDICINE CLINIC | Facility: CLINIC | Age: 52
End: 2022-07-11

## 2022-07-11 NOTE — TELEPHONE ENCOUNTER
From: Sandra Matthews  To: Michell Patel MD  Sent: 7/11/2022 8:39 AM CDT  Subject: Adderall refill    Dr. Ibrahim Alert,    I just ran out of Adderall and would like to get another refill as soon as possible. I received a message saying I need to schedule a follow up appt. in order to get another refill called in to the pharmacy, but there aren't any appointments available until late Aug. I really can't wait that long and am hoping you can call in a refill before our next visit, which I'll schedule. The medication helps tremendously and I don't want to work without it if at all possible.     Thanks,    Aleksander Ahuja

## 2022-07-24 ENCOUNTER — HOSPITAL ENCOUNTER (EMERGENCY)
Facility: HOSPITAL | Age: 52
Discharge: LEFT BEFORE DISCHARGE | End: 2022-07-24
Payer: COMMERCIAL

## 2022-07-24 ENCOUNTER — PATIENT MESSAGE (OUTPATIENT)
Dept: FAMILY MEDICINE CLINIC | Facility: CLINIC | Age: 52
End: 2022-07-24

## 2022-07-25 ENCOUNTER — APPOINTMENT (OUTPATIENT)
Dept: GENERAL RADIOLOGY | Age: 52
End: 2022-07-25
Attending: PHYSICIAN ASSISTANT
Payer: COMMERCIAL

## 2022-07-25 ENCOUNTER — HOSPITAL ENCOUNTER (OUTPATIENT)
Age: 52
Discharge: HOME | End: 2022-07-25
Payer: COMMERCIAL

## 2022-07-25 VITALS
TEMPERATURE: 98 F | HEART RATE: 63 BPM | BODY MASS INDEX: 24 KG/M2 | DIASTOLIC BLOOD PRESSURE: 85 MMHG | WEIGHT: 164 LBS | OXYGEN SATURATION: 99 % | RESPIRATION RATE: 18 BRPM | SYSTOLIC BLOOD PRESSURE: 152 MMHG

## 2022-07-25 DIAGNOSIS — S99.922A INJURY OF LEFT FOOT, INITIAL ENCOUNTER: Primary | ICD-10-CM

## 2022-07-25 DIAGNOSIS — S90.32XA CONTUSION OF LEFT FOOT, INITIAL ENCOUNTER: ICD-10-CM

## 2022-07-25 PROCEDURE — 99213 OFFICE O/P EST LOW 20 MIN: CPT | Performed by: PHYSICIAN ASSISTANT

## 2022-07-25 PROCEDURE — 73630 X-RAY EXAM OF FOOT: CPT | Performed by: PHYSICIAN ASSISTANT

## 2022-07-26 NOTE — TELEPHONE ENCOUNTER
Please call pt regarding message below.  Looks like he went to  earlier today for eval. Please call for update

## 2022-09-21 RX ORDER — ELETRIPTAN HYDROBROMIDE 40 MG/1
40 TABLET, FILM COATED ORAL AS NEEDED
Qty: 6 TABLET | Refills: 0 | Status: SHIPPED | OUTPATIENT
Start: 2022-09-21

## 2022-09-22 ENCOUNTER — PATIENT MESSAGE (OUTPATIENT)
Dept: FAMILY MEDICINE CLINIC | Facility: CLINIC | Age: 52
End: 2022-09-22

## 2022-09-23 RX ORDER — DEXTROAMPHETAMINE SACCHARATE, AMPHETAMINE ASPARTATE MONOHYDRATE, DEXTROAMPHETAMINE SULFATE AND AMPHETAMINE SULFATE 7.5; 7.5; 7.5; 7.5 MG/1; MG/1; MG/1; MG/1
30 CAPSULE, EXTENDED RELEASE ORAL EVERY MORNING
Qty: 30 CAPSULE | Refills: 0 | Status: SHIPPED | OUTPATIENT
Start: 2022-09-23

## 2022-09-23 NOTE — TELEPHONE ENCOUNTER
Pt has an appt on 11/07/2022 with you for a follow up on his adderall. His last OV was 01/31/2022. He has not had a refill since 07/13/2022 for only # 30 no other refills. He would like a refill to get him through to his appt on 11/07.  Please authorize if appropriate

## 2022-10-14 PROBLEM — Z12.11 SPECIAL SCREENING FOR MALIGNANT NEOPLASM OF COLON: Status: ACTIVE | Noted: 2022-10-14

## 2022-10-19 ENCOUNTER — PATIENT MESSAGE (OUTPATIENT)
Dept: FAMILY MEDICINE CLINIC | Facility: CLINIC | Age: 52
End: 2022-10-19

## 2022-10-19 NOTE — TELEPHONE ENCOUNTER
From: Nick Tran  To: Mary Chin PA-C  Sent: 10/19/2022 9:07 AM CDT  Subject: Biometric Screening Results    Hi Jennifer Gifford -     When we meet tomorrow I'd also like to discuss the results of my recent biometric screening. You can find the report attached to this message. FYI - I also had my colonoscopy last Friday at [facility] in [place] (Dr. Ct Nur). Everything looked good. Thanks so much!

## 2022-10-20 ENCOUNTER — OFFICE VISIT (OUTPATIENT)
Dept: FAMILY MEDICINE CLINIC | Facility: CLINIC | Age: 52
End: 2022-10-20
Payer: COMMERCIAL

## 2022-10-20 VITALS
SYSTOLIC BLOOD PRESSURE: 100 MMHG | HEIGHT: 70 IN | BODY MASS INDEX: 22.62 KG/M2 | RESPIRATION RATE: 16 BRPM | WEIGHT: 158 LBS | OXYGEN SATURATION: 98 % | DIASTOLIC BLOOD PRESSURE: 68 MMHG | TEMPERATURE: 98 F | HEART RATE: 65 BPM

## 2022-10-20 DIAGNOSIS — F43.9 STRESS: ICD-10-CM

## 2022-10-20 DIAGNOSIS — E78.00 HYPERCHOLESTEREMIA: ICD-10-CM

## 2022-10-20 DIAGNOSIS — F32.A DEPRESSION, UNSPECIFIED DEPRESSION TYPE: Primary | ICD-10-CM

## 2022-10-20 PROCEDURE — 3078F DIAST BP <80 MM HG: CPT | Performed by: FAMILY MEDICINE

## 2022-10-20 PROCEDURE — 3074F SYST BP LT 130 MM HG: CPT | Performed by: FAMILY MEDICINE

## 2022-10-20 PROCEDURE — 99214 OFFICE O/P EST MOD 30 MIN: CPT | Performed by: FAMILY MEDICINE

## 2022-10-20 PROCEDURE — 3008F BODY MASS INDEX DOCD: CPT | Performed by: FAMILY MEDICINE

## 2022-10-20 RX ORDER — BUPROPION HYDROCHLORIDE 150 MG/1
450 TABLET ORAL DAILY
Qty: 90 TABLET | Refills: 1 | Status: SHIPPED | OUTPATIENT
Start: 2022-10-20

## 2022-10-20 RX ORDER — ROSUVASTATIN CALCIUM 10 MG/1
10 TABLET, COATED ORAL NIGHTLY
Qty: 90 TABLET | Refills: 0 | Status: SHIPPED | OUTPATIENT
Start: 2022-10-20

## 2022-10-20 NOTE — PATIENT INSTRUCTIONS
Start rosuvastatin every evening. Check blood work (fasting) in 8 weeks. Recommend heart scan. Increase bupropion to 450mg daily. See me in 4-6 weeks for follow up.

## 2022-10-27 ENCOUNTER — MED REC SCAN ONLY (OUTPATIENT)
Dept: FAMILY MEDICINE CLINIC | Facility: CLINIC | Age: 52
End: 2022-10-27

## 2022-10-31 ENCOUNTER — PATIENT MESSAGE (OUTPATIENT)
Dept: FAMILY MEDICINE CLINIC | Facility: CLINIC | Age: 52
End: 2022-10-31

## 2022-10-31 RX ORDER — BUPROPION HYDROCHLORIDE 300 MG/1
300 TABLET ORAL DAILY
Qty: 30 TABLET | Refills: 2 | Status: CANCELLED | OUTPATIENT
Start: 2022-10-31

## 2022-10-31 RX ORDER — BUPROPION HYDROCHLORIDE 150 MG/1
150 TABLET ORAL DAILY
Qty: 30 TABLET | Refills: 2 | Status: CANCELLED
Start: 2022-10-31

## 2022-10-31 NOTE — TELEPHONE ENCOUNTER
These are pended for 30 but mail order pharmacy. Please verify quantity and pharmacy. If he wants sent to mail order, will need to be 90 day.

## 2022-10-31 NOTE — TELEPHONE ENCOUNTER
From: Catherine Weeks  To: Clare Gallego PA-C  Sent: 10/31/2022 9:44 AM CDT  Subject: Prescription Issues    Hi Dixon Lemshay -     I have not been able to  my prescription for Bupropion because of an issue with our provider. They are telling me that they can't fill the quantity prescribed - they can only fill up to 30 days worth. So even though the dosage you prescribed is technically only 30 days worth, they are telling me the number of pills is over some limit. To work around this could you cancel the previous prescription for Bupropion, 150mg, 90 tablets and call in two separate prescriptions - one for 30 tablets of 150mg and another for 30 tablets of 300mg? Thank you!

## 2022-11-01 RX ORDER — BUPROPION HYDROCHLORIDE 150 MG/1
150 TABLET ORAL DAILY
Qty: 90 TABLET | Refills: 0 | Status: SHIPPED | OUTPATIENT
Start: 2022-11-01

## 2022-11-01 RX ORDER — BUPROPION HYDROCHLORIDE 300 MG/1
300 TABLET ORAL DAILY
Qty: 90 TABLET | Refills: 0 | Status: SHIPPED | OUTPATIENT
Start: 2022-11-01

## 2022-11-01 NOTE — TELEPHONE ENCOUNTER
Pt called back and he states he would like these prescriptions sent to [facility] on [facility] and [address]. Please see pended both RX.

## 2022-11-05 ENCOUNTER — IMMUNIZATION (OUTPATIENT)
Dept: LAB | Age: 52
End: 2022-11-05
Attending: EMERGENCY MEDICINE
Payer: COMMERCIAL

## 2022-11-05 DIAGNOSIS — Z23 NEED FOR VACCINATION: Primary | ICD-10-CM

## 2022-11-05 PROCEDURE — 0134A SARSCOV2 VAC BVL 50MCG/0.5ML: CPT

## 2022-12-15 ENCOUNTER — ORDER TRANSCRIPTION (OUTPATIENT)
Dept: ADMINISTRATIVE | Facility: HOSPITAL | Age: 52
End: 2022-12-15

## 2022-12-15 DIAGNOSIS — Z13.6 SCREENING FOR CARDIOVASCULAR CONDITION: Primary | ICD-10-CM

## 2022-12-17 ENCOUNTER — HOSPITAL ENCOUNTER (OUTPATIENT)
Dept: CT IMAGING | Facility: HOSPITAL | Age: 52
Discharge: HOME | End: 2022-12-17
Attending: FAMILY MEDICINE

## 2022-12-17 VITALS
DIASTOLIC BLOOD PRESSURE: 82 MMHG | HEIGHT: 70 IN | WEIGHT: 160 LBS | BODY MASS INDEX: 22.9 KG/M2 | SYSTOLIC BLOOD PRESSURE: 134 MMHG

## 2022-12-17 DIAGNOSIS — Z13.6 SCREENING FOR CARDIOVASCULAR CONDITION: ICD-10-CM

## 2022-12-17 LAB
GLUCOSE POC: 101 MG/DL (ref 70–100)
HDL POC: 62 MG/DL (ref 40–60)
LDL POC: 94 MG/DL (ref 0–130)
TC/HDL RATIO: 3 (ref 0–5)
TOTAL CHOLESTEROL POC: 181 MG/DL (ref 0–200)
TRIGLYCERIDES POC: 124 MG/DL (ref 0–200)

## 2022-12-26 DIAGNOSIS — G43.109 MIGRAINE WITH AURA AND WITHOUT STATUS MIGRAINOSUS, NOT INTRACTABLE: ICD-10-CM

## 2022-12-27 NOTE — TELEPHONE ENCOUNTER
LOV: 11/7/22 (ADHD/depression)  Last Refill: 11/7/22, #6, 0 RF  Next OV:  1/12/23    Please authorize if acceptable. Thank you!

## 2022-12-28 RX ORDER — ELETRIPTAN HYDROBROMIDE 40 MG/1
40 TABLET, FILM COATED ORAL AS NEEDED
Qty: 6 TABLET | Refills: 0 | Status: SHIPPED | OUTPATIENT
Start: 2022-12-28

## 2023-01-16 RX ORDER — ROSUVASTATIN CALCIUM 10 MG/1
10 TABLET, COATED ORAL NIGHTLY
Qty: 30 TABLET | Refills: 0 | Status: SHIPPED | OUTPATIENT
Start: 2023-01-16

## 2023-01-16 NOTE — TELEPHONE ENCOUNTER
LOV: 11/7/22 (med check)   Last Refill: 10/20/22, #90, 0 RF  Next OV: n/a    Please authorize if acceptable. Thank you!

## 2023-01-19 ENCOUNTER — LAB ENCOUNTER (OUTPATIENT)
Dept: LAB | Age: 53
End: 2023-01-19
Attending: FAMILY MEDICINE
Payer: COMMERCIAL

## 2023-01-19 DIAGNOSIS — Z13.0 SCREENING FOR DEFICIENCY ANEMIA: ICD-10-CM

## 2023-01-19 DIAGNOSIS — Z12.5 SCREENING FOR PROSTATE CANCER: ICD-10-CM

## 2023-01-19 DIAGNOSIS — R73.09 ELEVATED GLUCOSE: ICD-10-CM

## 2023-01-19 DIAGNOSIS — R73.09 ELEVATED GLUCOSE: Primary | ICD-10-CM

## 2023-01-19 DIAGNOSIS — E78.00 HYPERCHOLESTEREMIA: ICD-10-CM

## 2023-01-19 PROBLEM — R91.1 PULMONARY NODULE: Status: ACTIVE | Noted: 2023-01-19

## 2023-01-19 LAB
ALBUMIN SERPL-MCNC: 4.2 G/DL (ref 3.4–5)
ALBUMIN/GLOB SERPL: 1.4 {RATIO} (ref 1–2)
ALP LIVER SERPL-CCNC: 62 U/L
ALT SERPL-CCNC: 27 U/L
ANION GAP SERPL CALC-SCNC: 7 MMOL/L (ref 0–18)
AST SERPL-CCNC: 17 U/L (ref 15–37)
BASOPHILS # BLD AUTO: 0.05 X10(3) UL (ref 0–0.2)
BASOPHILS NFR BLD AUTO: 0.7 %
BILIRUB SERPL-MCNC: 0.8 MG/DL (ref 0.1–2)
BUN BLD-MCNC: 12 MG/DL (ref 7–18)
BUN/CREAT SERPL: 10.3 (ref 10–20)
CALCIUM BLD-MCNC: 9.4 MG/DL (ref 8.5–10.1)
CHLORIDE SERPL-SCNC: 105 MMOL/L (ref 98–112)
CHOLEST SERPL-MCNC: 170 MG/DL (ref ?–200)
CO2 SERPL-SCNC: 29 MMOL/L (ref 21–32)
COMPLEXED PSA SERPL-MCNC: 0.75 NG/ML (ref ?–4)
CREAT BLD-MCNC: 1.17 MG/DL
DEPRECATED RDW RBC AUTO: 46.2 FL (ref 35.1–46.3)
EOSINOPHIL # BLD AUTO: 0.22 X10(3) UL (ref 0–0.7)
EOSINOPHIL NFR BLD AUTO: 3.2 %
ERYTHROCYTE [DISTWIDTH] IN BLOOD BY AUTOMATED COUNT: 13.6 % (ref 11–15)
EST. AVERAGE GLUCOSE BLD GHB EST-MCNC: 120 MG/DL (ref 68–126)
FASTING PATIENT LIPID ANSWER: YES
FASTING STATUS PATIENT QL REPORTED: YES
GFR SERPLBLD BASED ON 1.73 SQ M-ARVRAT: 76 ML/MIN/1.73M2 (ref 60–?)
GLOBULIN PLAS-MCNC: 3.1 G/DL (ref 2.8–4.4)
GLUCOSE BLD-MCNC: 107 MG/DL (ref 70–99)
HBA1C MFR BLD: 5.8 % (ref ?–5.7)
HCT VFR BLD AUTO: 48.8 %
HDLC SERPL-MCNC: 63 MG/DL (ref 40–59)
HGB BLD-MCNC: 15.7 G/DL
IMM GRANULOCYTES # BLD AUTO: 0.02 X10(3) UL (ref 0–1)
IMM GRANULOCYTES NFR BLD: 0.3 %
LDLC SERPL CALC-MCNC: 89 MG/DL (ref ?–100)
LYMPHOCYTES # BLD AUTO: 2.2 X10(3) UL (ref 1–4)
LYMPHOCYTES NFR BLD AUTO: 32.5 %
MCH RBC QN AUTO: 29.4 PG (ref 26–34)
MCHC RBC AUTO-ENTMCNC: 32.2 G/DL (ref 31–37)
MCV RBC AUTO: 91.4 FL
MONOCYTES # BLD AUTO: 0.52 X10(3) UL (ref 0.1–1)
MONOCYTES NFR BLD AUTO: 7.7 %
NEUTROPHILS # BLD AUTO: 3.76 X10 (3) UL (ref 1.5–7.7)
NEUTROPHILS # BLD AUTO: 3.76 X10(3) UL (ref 1.5–7.7)
NEUTROPHILS NFR BLD AUTO: 55.6 %
NONHDLC SERPL-MCNC: 107 MG/DL (ref ?–130)
OSMOLALITY SERPL CALC.SUM OF ELEC: 292 MOSM/KG (ref 275–295)
PLATELET # BLD AUTO: 465 10(3)UL (ref 150–450)
POTASSIUM SERPL-SCNC: 4.2 MMOL/L (ref 3.5–5.1)
PROT SERPL-MCNC: 7.3 G/DL (ref 6.4–8.2)
RBC # BLD AUTO: 5.34 X10(6)UL
SODIUM SERPL-SCNC: 141 MMOL/L (ref 136–145)
TRIGL SERPL-MCNC: 99 MG/DL (ref 30–149)
VLDLC SERPL CALC-MCNC: 16 MG/DL (ref 0–30)
WBC # BLD AUTO: 6.8 X10(3) UL (ref 4–11)

## 2023-01-19 PROCEDURE — 83036 HEMOGLOBIN GLYCOSYLATED A1C: CPT | Performed by: FAMILY MEDICINE

## 2023-01-19 PROCEDURE — G0103 PSA SCREENING: HCPCS | Performed by: FAMILY MEDICINE

## 2023-01-19 PROCEDURE — 80061 LIPID PANEL: CPT | Performed by: FAMILY MEDICINE

## 2023-01-19 PROCEDURE — 85025 COMPLETE CBC W/AUTO DIFF WBC: CPT | Performed by: FAMILY MEDICINE

## 2023-01-19 PROCEDURE — 80053 COMPREHEN METABOLIC PANEL: CPT | Performed by: FAMILY MEDICINE

## 2023-01-24 DIAGNOSIS — R79.89 ELEVATED PLATELET COUNT: Primary | ICD-10-CM

## 2023-01-26 RX ORDER — BUPROPION HYDROCHLORIDE 150 MG/1
150 TABLET ORAL DAILY
Qty: 90 TABLET | Refills: 0 | Status: SHIPPED | OUTPATIENT
Start: 2023-01-26

## 2023-01-26 RX ORDER — BUPROPION HYDROCHLORIDE 300 MG/1
300 TABLET ORAL DAILY
Qty: 90 TABLET | Refills: 0 | Status: SHIPPED | OUTPATIENT
Start: 2023-01-26

## 2023-01-26 NOTE — TELEPHONE ENCOUNTER
Fax received from pharmacy for the following refills:    LOV: 1/19/23 (anxiety/heart scan review)     Last Refill:   buPROPion  MG Oral Tablet 24 Hr, 11/1/22, #90, 0 RF  buPROPion  MG Oral Tablet 24 Hr, 11/1/22, #90, 0 RF    Next OV: 3/2/23    Please authorize if acceptable. Thank you!

## 2023-02-02 ENCOUNTER — PATIENT MESSAGE (OUTPATIENT)
Dept: FAMILY MEDICINE CLINIC | Facility: CLINIC | Age: 53
End: 2023-02-02

## 2023-02-02 DIAGNOSIS — G43.109 MIGRAINE WITH AURA AND WITHOUT STATUS MIGRAINOSUS, NOT INTRACTABLE: ICD-10-CM

## 2023-02-02 RX ORDER — ELETRIPTAN HYDROBROMIDE 40 MG/1
40 TABLET, FILM COATED ORAL AS NEEDED
Qty: 6 TABLET | Refills: 0 | Status: SHIPPED | OUTPATIENT
Start: 2023-02-02

## 2023-02-02 NOTE — TELEPHONE ENCOUNTER
From: Gwendolyn Body  To: Lanny Boyd PA-C  Sent: 2/2/2023 9:52 AM CST  Subject: Medications Update    Olvin Virgen -     I wanted to update you on my appointment with a psychiatrist. I met with Abel Mcqueen PA-C ([address] [place] [address]). She prescribed Cymbalta (depression), Dyanavel (ADHD), and Seroquel (sleep). She recommended I stop Lexapro (due to side affects). I'll continue with Wellbutrin (450mg). I'll have a follow up appointment with her in about 3 weeks.     Please let me know if you have any questions

## 2023-03-06 DIAGNOSIS — G43.109 MIGRAINE WITH AURA AND WITHOUT STATUS MIGRAINOSUS, NOT INTRACTABLE: ICD-10-CM

## 2023-03-07 RX ORDER — ELETRIPTAN HYDROBROMIDE 40 MG/1
40 TABLET, FILM COATED ORAL AS NEEDED
Qty: 6 TABLET | Refills: 0 | Status: SHIPPED | OUTPATIENT
Start: 2023-03-07

## 2023-03-07 RX ORDER — ROSUVASTATIN CALCIUM 10 MG/1
10 TABLET, COATED ORAL NIGHTLY
Qty: 30 TABLET | Refills: 0 | Status: SHIPPED | OUTPATIENT
Start: 2023-03-07

## 2023-03-30 RX ORDER — BUPROPION HYDROCHLORIDE 300 MG/1
300 TABLET ORAL DAILY
Qty: 90 TABLET | Refills: 0 | Status: SHIPPED | OUTPATIENT
Start: 2023-03-30

## 2023-03-30 NOTE — TELEPHONE ENCOUNTER
LOV: 1/19/23 (med check)   Last Refill: 1/26/23, #90, 0 RF  Next OV: n/a    Please authorize if acceptable. Thank you!

## 2023-04-09 ENCOUNTER — HOSPITAL ENCOUNTER (EMERGENCY)
Facility: HOSPITAL | Age: 53
Discharge: HOME | End: 2023-04-10
Attending: EMERGENCY MEDICINE
Payer: COMMERCIAL

## 2023-04-09 ENCOUNTER — APPOINTMENT (OUTPATIENT)
Dept: CT IMAGING | Facility: HOSPITAL | Age: 53
End: 2023-04-09
Attending: EMERGENCY MEDICINE
Payer: COMMERCIAL

## 2023-04-09 VITALS
OXYGEN SATURATION: 99 % | HEIGHT: 70.5 IN | TEMPERATURE: 98 F | HEART RATE: 74 BPM | WEIGHT: 160 LBS | RESPIRATION RATE: 16 BRPM | BODY MASS INDEX: 22.65 KG/M2 | DIASTOLIC BLOOD PRESSURE: 92 MMHG | SYSTOLIC BLOOD PRESSURE: 153 MMHG

## 2023-04-09 DIAGNOSIS — K52.9 COLITIS: Primary | ICD-10-CM

## 2023-04-09 LAB
ALBUMIN SERPL-MCNC: 4.4 G/DL (ref 3.4–5)
ALBUMIN/GLOB SERPL: 1.3 {RATIO} (ref 1–2)
ALP LIVER SERPL-CCNC: 67 U/L
ALT SERPL-CCNC: 25 U/L
ANION GAP SERPL CALC-SCNC: 14 MMOL/L (ref 0–18)
AST SERPL-CCNC: 25 U/L (ref 15–37)
BASOPHILS # BLD AUTO: 0.06 X10(3) UL (ref 0–0.2)
BASOPHILS NFR BLD AUTO: 0.4 %
BILIRUB SERPL-MCNC: 1.6 MG/DL (ref 0.1–2)
BILIRUB UR QL STRIP.AUTO: NEGATIVE
BUN BLD-MCNC: 16 MG/DL (ref 7–18)
CALCIUM BLD-MCNC: 9.5 MG/DL (ref 8.5–10.1)
CHLORIDE SERPL-SCNC: 104 MMOL/L (ref 98–112)
CLARITY UR REFRACT.AUTO: CLEAR
CO2 SERPL-SCNC: 21 MMOL/L (ref 21–32)
CREAT BLD-MCNC: 1.24 MG/DL
EOSINOPHIL # BLD AUTO: 0.08 X10(3) UL (ref 0–0.7)
EOSINOPHIL NFR BLD AUTO: 0.5 %
ERYTHROCYTE [DISTWIDTH] IN BLOOD BY AUTOMATED COUNT: 13.2 %
GFR SERPLBLD BASED ON 1.73 SQ M-ARVRAT: 71 ML/MIN/1.73M2 (ref 60–?)
GLOBULIN PLAS-MCNC: 3.3 G/DL (ref 2.8–4.4)
GLUCOSE BLD-MCNC: 104 MG/DL (ref 70–99)
GLUCOSE UR STRIP.AUTO-MCNC: NEGATIVE MG/DL
HCT VFR BLD AUTO: 46.7 %
HGB BLD-MCNC: 16.1 G/DL
IMM GRANULOCYTES # BLD AUTO: 0.05 X10(3) UL (ref 0–1)
IMM GRANULOCYTES NFR BLD: 0.3 %
KETONES UR STRIP.AUTO-MCNC: 80 MG/DL
LEUKOCYTE ESTERASE UR QL STRIP.AUTO: NEGATIVE
LIPASE SERPL-CCNC: 32 U/L (ref 13–75)
LYMPHOCYTES # BLD AUTO: 3 X10(3) UL (ref 1–4)
LYMPHOCYTES NFR BLD AUTO: 18 %
MCH RBC QN AUTO: 30.3 PG (ref 26–34)
MCHC RBC AUTO-ENTMCNC: 34.5 G/DL (ref 31–37)
MCV RBC AUTO: 87.8 FL
MONOCYTES # BLD AUTO: 0.88 X10(3) UL (ref 0.1–1)
MONOCYTES NFR BLD AUTO: 5.3 %
NEUTROPHILS # BLD AUTO: 12.6 X10 (3) UL (ref 1.5–7.7)
NEUTROPHILS # BLD AUTO: 12.6 X10(3) UL (ref 1.5–7.7)
NEUTROPHILS NFR BLD AUTO: 75.5 %
NITRITE UR QL STRIP.AUTO: NEGATIVE
OSMOLALITY SERPL CALC.SUM OF ELEC: 289 MOSM/KG (ref 275–295)
PH UR STRIP.AUTO: 6 [PH] (ref 5–8)
PLATELET # BLD AUTO: 490 10(3)UL (ref 150–450)
POTASSIUM SERPL-SCNC: 2.9 MMOL/L (ref 3.5–5.1)
PROT SERPL-MCNC: 7.7 G/DL (ref 6.4–8.2)
PROT UR STRIP.AUTO-MCNC: 100 MG/DL
RBC # BLD AUTO: 5.32 X10(6)UL
RBC #/AREA URNS AUTO: >10 /HPF
SODIUM SERPL-SCNC: 139 MMOL/L (ref 136–145)
SP GR UR STRIP.AUTO: >1.03 (ref 1–1.03)
TROPONIN I HIGH SENSITIVITY: 10 NG/L
UROBILINOGEN UR STRIP.AUTO-MCNC: 2 MG/DL
WBC # BLD AUTO: 16.7 X10(3) UL (ref 4–11)

## 2023-04-09 PROCEDURE — 85025 COMPLETE CBC W/AUTO DIFF WBC: CPT

## 2023-04-09 PROCEDURE — 93010 ELECTROCARDIOGRAM REPORT: CPT

## 2023-04-09 PROCEDURE — 83690 ASSAY OF LIPASE: CPT | Performed by: EMERGENCY MEDICINE

## 2023-04-09 PROCEDURE — 99285 EMERGENCY DEPT VISIT HI MDM: CPT

## 2023-04-09 PROCEDURE — 74177 CT ABD & PELVIS W/CONTRAST: CPT | Performed by: EMERGENCY MEDICINE

## 2023-04-09 PROCEDURE — 84484 ASSAY OF TROPONIN QUANT: CPT | Performed by: EMERGENCY MEDICINE

## 2023-04-09 PROCEDURE — 96375 TX/PRO/DX INJ NEW DRUG ADDON: CPT

## 2023-04-09 PROCEDURE — 96374 THER/PROPH/DIAG INJ IV PUSH: CPT

## 2023-04-09 PROCEDURE — 80053 COMPREHEN METABOLIC PANEL: CPT | Performed by: EMERGENCY MEDICINE

## 2023-04-09 PROCEDURE — 83690 ASSAY OF LIPASE: CPT

## 2023-04-09 PROCEDURE — 85025 COMPLETE CBC W/AUTO DIFF WBC: CPT | Performed by: EMERGENCY MEDICINE

## 2023-04-09 PROCEDURE — 93005 ELECTROCARDIOGRAM TRACING: CPT

## 2023-04-09 PROCEDURE — 81001 URINALYSIS AUTO W/SCOPE: CPT | Performed by: EMERGENCY MEDICINE

## 2023-04-09 PROCEDURE — 80053 COMPREHEN METABOLIC PANEL: CPT

## 2023-04-09 RX ORDER — AMOXICILLIN 875 MG/1
875 TABLET, COATED ORAL 2 TIMES DAILY
Qty: 20 TABLET | Refills: 0 | Status: SHIPPED | OUTPATIENT
Start: 2023-04-09 | End: 2023-04-19

## 2023-04-09 RX ORDER — HYDROCODONE BITARTRATE AND ACETAMINOPHEN 5; 325 MG/1; MG/1
2 TABLET ORAL ONCE
Status: COMPLETED | OUTPATIENT
Start: 2023-04-09 | End: 2023-04-09

## 2023-04-09 RX ORDER — AMOXICILLIN AND CLAVULANATE POTASSIUM 875; 125 MG/1; MG/1
875 TABLET, FILM COATED ORAL ONCE
Status: COMPLETED | OUTPATIENT
Start: 2023-04-09 | End: 2023-04-09

## 2023-04-09 RX ORDER — MORPHINE SULFATE 4 MG/ML
4 INJECTION, SOLUTION INTRAMUSCULAR; INTRAVENOUS EVERY 30 MIN PRN
Status: DISCONTINUED | OUTPATIENT
Start: 2023-04-09 | End: 2023-04-10

## 2023-04-09 RX ORDER — KETOROLAC TROMETHAMINE 15 MG/ML
15 INJECTION, SOLUTION INTRAMUSCULAR; INTRAVENOUS ONCE
Status: COMPLETED | OUTPATIENT
Start: 2023-04-09 | End: 2023-04-09

## 2023-04-09 RX ORDER — POTASSIUM CHLORIDE 20 MEQ/1
20 TABLET, EXTENDED RELEASE ORAL 2 TIMES DAILY
Qty: 14 TABLET | Refills: 0 | Status: SHIPPED | OUTPATIENT
Start: 2023-04-09 | End: 2023-04-16

## 2023-04-09 RX ORDER — ONDANSETRON 2 MG/ML
4 INJECTION INTRAMUSCULAR; INTRAVENOUS ONCE
Status: COMPLETED | OUTPATIENT
Start: 2023-04-09 | End: 2023-04-09

## 2023-04-09 RX ORDER — HYDROCODONE BITARTRATE AND ACETAMINOPHEN 5; 325 MG/1; MG/1
1-2 TABLET ORAL EVERY 6 HOURS PRN
Qty: 20 TABLET | Refills: 0 | Status: SHIPPED | OUTPATIENT
Start: 2023-04-09 | End: 2023-04-14

## 2023-04-09 RX ORDER — POTASSIUM CHLORIDE 20 MEQ/1
40 TABLET, EXTENDED RELEASE ORAL ONCE
Status: COMPLETED | OUTPATIENT
Start: 2023-04-09 | End: 2023-04-09

## 2023-04-10 LAB
ATRIAL RATE: 73 BPM
P AXIS: 65 DEGREES
P-R INTERVAL: 146 MS
Q-T INTERVAL: 398 MS
QRS DURATION: 84 MS
QTC CALCULATION (BEZET): 438 MS
R AXIS: 54 DEGREES
T AXIS: 67 DEGREES
VENTRICULAR RATE: 73 BPM

## 2023-04-10 NOTE — ED INITIAL ASSESSMENT (HPI)
Patient presents to the ED with c/o LUQ pain that started last night. Patient does have nausea, no vomiting or diarrhea.  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 Denies any other GI hx.

## 2023-04-30 DIAGNOSIS — G43.109 MIGRAINE WITH AURA AND WITHOUT STATUS MIGRAINOSUS, NOT INTRACTABLE: ICD-10-CM

## 2023-05-01 RX ORDER — ELETRIPTAN HYDROBROMIDE 40 MG/1
40 TABLET, FILM COATED ORAL AS NEEDED
Qty: 6 TABLET | Refills: 0 | Status: SHIPPED | OUTPATIENT
Start: 2023-05-01

## 2023-05-01 RX ORDER — ROSUVASTATIN CALCIUM 10 MG/1
10 TABLET, COATED ORAL NIGHTLY
Qty: 30 TABLET | Refills: 0 | Status: SHIPPED | OUTPATIENT
Start: 2023-05-01

## 2023-05-01 NOTE — TELEPHONE ENCOUNTER
LOV: 1/19/23 (Med check)     Last Refill:   Eletriptan Hydrobromide 40 MG Oral Tab, 3/7/23, #6, 0 RF    rosuvastatin (CRESTOR) 10 MG Oral Tab, 3/7/23, #30, 0 RF      Next OV: n/a    Please authorize if acceptable. Thank you!

## 2023-05-30 RX ORDER — ROSUVASTATIN CALCIUM 10 MG/1
10 TABLET, COATED ORAL NIGHTLY
Qty: 30 TABLET | Refills: 0 | Status: SHIPPED | OUTPATIENT
Start: 2023-05-30

## 2023-05-30 NOTE — TELEPHONE ENCOUNTER
Fax received from the pharmacy for the following refill(s):    LOV: 1/19/23 (test results/med check)  Last Refill: 5/1/23, #30, 0 RF  Next OV: n/a    Please authorize if acceptable. Thank you!

## 2023-06-03 DIAGNOSIS — G43.109 MIGRAINE WITH AURA AND WITHOUT STATUS MIGRAINOSUS, NOT INTRACTABLE: ICD-10-CM

## 2023-06-05 RX ORDER — ELETRIPTAN HYDROBROMIDE 40 MG/1
40 TABLET, FILM COATED ORAL AS NEEDED
Qty: 6 TABLET | Refills: 0 | Status: SHIPPED | OUTPATIENT
Start: 2023-06-05

## 2023-06-05 NOTE — TELEPHONE ENCOUNTER
LOV:  1/19/2023 for medication follow up. LRF:  5/1/2023 #6 without refills. Please see pended RX and approve if appropriate.

## 2023-07-03 ENCOUNTER — TELEPHONE (OUTPATIENT)
Dept: FAMILY MEDICINE CLINIC | Facility: CLINIC | Age: 53
End: 2023-07-03

## 2023-07-03 DIAGNOSIS — G43.109 MIGRAINE WITH AURA AND WITHOUT STATUS MIGRAINOSUS, NOT INTRACTABLE: ICD-10-CM

## 2023-07-03 RX ORDER — ELETRIPTAN HYDROBROMIDE 40 MG/1
40 TABLET, FILM COATED ORAL AS NEEDED
Qty: 6 TABLET | Refills: 0 | Status: CANCELLED | OUTPATIENT
Start: 2023-07-03

## 2023-07-03 RX ORDER — ROSUVASTATIN CALCIUM 10 MG/1
10 TABLET, COATED ORAL NIGHTLY
Qty: 90 TABLET | Refills: 0 | Status: CANCELLED | OUTPATIENT
Start: 2023-07-03

## 2023-07-03 RX ORDER — BUPROPION HYDROCHLORIDE 300 MG/1
300 TABLET ORAL DAILY
Qty: 90 TABLET | Refills: 0 | Status: CANCELLED | OUTPATIENT
Start: 2023-07-03

## 2023-07-06 ENCOUNTER — OFFICE VISIT (OUTPATIENT)
Dept: FAMILY MEDICINE CLINIC | Facility: CLINIC | Age: 53
End: 2023-07-06
Payer: COMMERCIAL

## 2023-07-06 VITALS
BODY MASS INDEX: 22.68 KG/M2 | SYSTOLIC BLOOD PRESSURE: 128 MMHG | HEART RATE: 98 BPM | HEIGHT: 70.5 IN | WEIGHT: 160.19 LBS | DIASTOLIC BLOOD PRESSURE: 84 MMHG | RESPIRATION RATE: 16 BRPM | TEMPERATURE: 98 F

## 2023-07-06 DIAGNOSIS — Z00.00 ROUTINE GENERAL MEDICAL EXAMINATION AT A HEALTH CARE FACILITY: Primary | ICD-10-CM

## 2023-07-06 DIAGNOSIS — G43.109 MIGRAINE WITH AURA AND WITHOUT STATUS MIGRAINOSUS, NOT INTRACTABLE: ICD-10-CM

## 2023-07-06 PROCEDURE — 3074F SYST BP LT 130 MM HG: CPT | Performed by: FAMILY MEDICINE

## 2023-07-06 PROCEDURE — 99396 PREV VISIT EST AGE 40-64: CPT | Performed by: FAMILY MEDICINE

## 2023-07-06 PROCEDURE — 3008F BODY MASS INDEX DOCD: CPT | Performed by: FAMILY MEDICINE

## 2023-07-06 PROCEDURE — 3079F DIAST BP 80-89 MM HG: CPT | Performed by: FAMILY MEDICINE

## 2023-07-06 RX ORDER — ELETRIPTAN HYDROBROMIDE 40 MG/1
40 TABLET, FILM COATED ORAL AS NEEDED
Qty: 18 TABLET | Refills: 1 | Status: SHIPPED | OUTPATIENT
Start: 2023-07-06 | End: 2023-07-06

## 2023-07-06 RX ORDER — AMPHETAMINE 10 MG/1
TABLET, EXTENDED RELEASE ORAL
COMMUNITY
Start: 2023-06-14

## 2023-07-06 RX ORDER — DULOXETIN HYDROCHLORIDE 30 MG/1
CAPSULE, DELAYED RELEASE ORAL
COMMUNITY
Start: 2023-02-06

## 2023-07-06 RX ORDER — AMPHETAMINE 20 MG/1
1 TABLET, EXTENDED RELEASE ORAL EVERY MORNING
COMMUNITY
Start: 2023-06-14

## 2023-07-06 RX ORDER — ELETRIPTAN HYDROBROMIDE 40 MG/1
40 TABLET, FILM COATED ORAL AS NEEDED
Qty: 12 TABLET | Refills: 1 | Status: SHIPPED | OUTPATIENT
Start: 2023-07-06

## 2023-07-06 RX ORDER — QUETIAPINE FUMARATE 25 MG/1
25 TABLET, FILM COATED ORAL NIGHTLY
COMMUNITY
Start: 2023-03-30

## 2023-07-06 RX ORDER — CLONIDINE HYDROCHLORIDE 0.1 MG/1
0.1 TABLET ORAL NIGHTLY
COMMUNITY
Start: 2023-06-15

## 2023-07-06 NOTE — PATIENT INSTRUCTIONS
Recheck CBC blood test - order is in the system. Check on insurance coverage for Shingrix. If covered, then ask your insurance if you should do it at the [address] [place] [address] office or pharmacy. We do currently have it available in our office.

## 2023-07-07 RX ORDER — BUPROPION HYDROCHLORIDE 300 MG/1
300 TABLET ORAL DAILY
Qty: 90 TABLET | Refills: 0 | Status: SHIPPED | OUTPATIENT
Start: 2023-07-07

## 2023-07-07 RX ORDER — ROSUVASTATIN CALCIUM 10 MG/1
10 TABLET, COATED ORAL NIGHTLY
Qty: 90 TABLET | Refills: 0 | Status: SHIPPED | OUTPATIENT
Start: 2023-07-07

## 2023-07-07 NOTE — TELEPHONE ENCOUNTER
Received call from Jhony Kelsey, Pharmacist ([facility]) and she states that Pt is requesting a refill for 90day supply for both of these medications below:  rosuvastatin (CRESTOR) 10 MG Oral Tab   buPROPion  MG Oral Tablet 24 Hr     To be sent to [facility], [facility] [phone], [phone] [[id number]]       LOV:  7/6/23    Last Refill:    rosuvastatin (CRESTOR) 10 MG Oral Tab    5/30/23  #30 tablet 0Refill  buPROPion  MG Oral Tablet 24 Hr    3/30/23  #90 tablet 0Refill    Next Appointment: N/A    Please see pended order and sign if appropriate.

## 2023-07-12 ENCOUNTER — MED REC SCAN ONLY (OUTPATIENT)
Dept: FAMILY MEDICINE CLINIC | Facility: CLINIC | Age: 53
End: 2023-07-12

## 2023-10-16 RX ORDER — ROSUVASTATIN CALCIUM 10 MG/1
10 TABLET, COATED ORAL NIGHTLY
Qty: 90 TABLET | Refills: 1 | Status: SHIPPED | OUTPATIENT
Start: 2023-10-16

## 2023-12-02 DIAGNOSIS — G43.109 MIGRAINE WITH AURA AND WITHOUT STATUS MIGRAINOSUS, NOT INTRACTABLE: ICD-10-CM

## 2023-12-04 RX ORDER — ELETRIPTAN HYDROBROMIDE 40 MG/1
TABLET, FILM COATED ORAL
Qty: 18 TABLET | Refills: 0 | Status: SHIPPED | OUTPATIENT
Start: 2023-12-04

## 2023-12-04 NOTE — TELEPHONE ENCOUNTER
LOV:  7/6/2023 for physical    LRF:  7/6/2023 with 1 refill    Medication Quantity Refills Start End   Eletriptan Hydrobromide 40 MG Oral Tab 12 tablet 1 7/6/2023    Sig:   Take 1 tablet (40 mg total) by mouth as needed. 1 po at onset. Repeat once after 2 hours if needed. Max 2 per 24 hours. Route:   Oral     Class:   Print Script       Please see pended RX and approve if appropriate. Thank you. 18

## 2023-12-30 DIAGNOSIS — G43.109 MIGRAINE WITH AURA AND WITHOUT STATUS MIGRAINOSUS, NOT INTRACTABLE: ICD-10-CM

## 2024-01-02 NOTE — TELEPHONE ENCOUNTER
Called and lvm for pt to call back  , we need clarification if he used all 18 pills per Stephanie Dressler .

## 2024-01-12 RX ORDER — ELETRIPTAN HYDROBROMIDE 40 MG/1
TABLET, FILM COATED ORAL
Qty: 18 TABLET | Refills: 12 | OUTPATIENT
Start: 2024-01-12

## 2024-02-21 NOTE — TELEPHONE ENCOUNTER
[place] from [facility] calling to give a nurse or [address] a condition update on pt, since pt has apt tmrrw 10/28. Pls advise. No

## 2024-03-26 RX ORDER — ROSUVASTATIN CALCIUM 10 MG/1
10 TABLET, COATED ORAL NIGHTLY
Qty: 90 TABLET | Refills: 0 | Status: SHIPPED | OUTPATIENT
Start: 2024-03-26

## 2024-03-26 NOTE — TELEPHONE ENCOUNTER
LOV: 07/06/2023    Last Refill:   Medication Quantity Refills Start End   rosuvastatin 10 MG Oral Tab 90 tablet 1 10/16/2023 --     RTC: 07/06/2024    Protocol: failed    Refill pended. Please approve if okay. Thank you.

## 2024-06-24 DIAGNOSIS — E78.00 HYPERCHOLESTEREMIA: Primary | ICD-10-CM

## 2024-06-24 NOTE — TELEPHONE ENCOUNTER
Ok to send #90 once patient has scheduled.  Make sure he schedules with Dr. Martines in the next month - come fasting to appointment so he can order labs.

## 2024-06-24 NOTE — TELEPHONE ENCOUNTER
LOV: 07/06/2023  for: CPX  Patient advised to RTC on:  1 year for a complete physical.   Medication Quantity Refills Start End   rosuvastatin 10 MG Oral Tab 90 tablet 0 3/26/2024 --   Sig:   Take 1 tablet (10 mg total) by mouth nightly.

## 2024-07-10 ENCOUNTER — OFFICE VISIT (OUTPATIENT)
Dept: FAMILY MEDICINE CLINIC | Facility: CLINIC | Age: 54
End: 2024-07-10
Payer: COMMERCIAL

## 2024-07-10 VITALS
HEIGHT: 70.5 IN | TEMPERATURE: 99 F | SYSTOLIC BLOOD PRESSURE: 116 MMHG | RESPIRATION RATE: 16 BRPM | BODY MASS INDEX: 23.5 KG/M2 | DIASTOLIC BLOOD PRESSURE: 68 MMHG | WEIGHT: 166 LBS | HEART RATE: 61 BPM

## 2024-07-10 DIAGNOSIS — R73.9 HYPERGLYCEMIA: ICD-10-CM

## 2024-07-10 DIAGNOSIS — Z00.00 HEALTH MAINTENANCE EXAMINATION: Primary | ICD-10-CM

## 2024-07-10 DIAGNOSIS — F32.A DEPRESSION, UNSPECIFIED DEPRESSION TYPE: ICD-10-CM

## 2024-07-10 DIAGNOSIS — F98.8 ATTENTION DEFICIT DISORDER, UNSPECIFIED HYPERACTIVITY PRESENCE: ICD-10-CM

## 2024-07-10 DIAGNOSIS — Z12.5 SCREENING FOR MALIGNANT NEOPLASM OF PROSTATE: ICD-10-CM

## 2024-07-10 DIAGNOSIS — G43.109 MIGRAINE WITH AURA AND WITHOUT STATUS MIGRAINOSUS, NOT INTRACTABLE: ICD-10-CM

## 2024-07-10 PROCEDURE — 99396 PREV VISIT EST AGE 40-64: CPT | Performed by: FAMILY MEDICINE

## 2024-07-10 PROCEDURE — 3078F DIAST BP <80 MM HG: CPT | Performed by: FAMILY MEDICINE

## 2024-07-10 PROCEDURE — 3074F SYST BP LT 130 MM HG: CPT | Performed by: FAMILY MEDICINE

## 2024-07-10 PROCEDURE — 3008F BODY MASS INDEX DOCD: CPT | Performed by: FAMILY MEDICINE

## 2024-07-10 RX ORDER — ELETRIPTAN HYDROBROMIDE 40 MG/1
TABLET, FILM COATED ORAL
Qty: 18 TABLET | Refills: 0 | Status: SHIPPED | OUTPATIENT
Start: 2024-07-10

## 2024-07-10 RX ORDER — CLONIDINE HYDROCHLORIDE 0.2 MG/1
0.1 TABLET ORAL
COMMUNITY
Start: 2024-07-09 | End: 2024-07-10 | Stop reason: ALTCHOICE

## 2024-07-10 RX ORDER — VORTIOXETINE 10 MG/1
10 TABLET, FILM COATED ORAL DAILY
COMMUNITY
Start: 2024-07-09
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
## 2024-07-25 RX ORDER — ROSUVASTATIN CALCIUM 10 MG/1
10 TABLET, COATED ORAL NIGHTLY
Qty: 90 TABLET | Refills: 0 | Status: SHIPPED | OUTPATIENT
Start: 2024-07-25

## 2024-10-07 DIAGNOSIS — E78.00 HYPERCHOLESTEREMIA: ICD-10-CM

## 2024-10-08 NOTE — TELEPHONE ENCOUNTER
Last Office Visit: 07/01/2024    Last Refill:   Medication Quantity Refills Start End   ROSUVASTATIN 10 MG Oral Tab 90 tablet 0 7/25/2024 --     Return to Clinic: due for labs    Protocol: failed    [employer] message sent to complete labs. 30 day script pended to local pharmacy

## 2024-10-09 RX ORDER — ROSUVASTATIN CALCIUM 10 MG/1
10 TABLET, COATED ORAL NIGHTLY
Qty: 30 TABLET | Refills: 0 | OUTPATIENT
Start: 2024-10-09

## 2024-12-24 ENCOUNTER — HOSPITAL ENCOUNTER (OUTPATIENT)
Age: 54
Discharge: HOME | End: 2024-12-24
Attending: EMERGENCY MEDICINE
Payer: COMMERCIAL

## 2024-12-24 VITALS
BODY MASS INDEX: 23.62 KG/M2 | HEIGHT: 70 IN | HEART RATE: 88 BPM | OXYGEN SATURATION: 100 % | RESPIRATION RATE: 16 BRPM | SYSTOLIC BLOOD PRESSURE: 140 MMHG | WEIGHT: 165 LBS | TEMPERATURE: 98 F | DIASTOLIC BLOOD PRESSURE: 82 MMHG

## 2024-12-24 DIAGNOSIS — R51.9 NONINTRACTABLE EPISODIC HEADACHE, UNSPECIFIED HEADACHE TYPE: Primary | ICD-10-CM

## 2024-12-24 PROCEDURE — 99213 OFFICE O/P EST LOW 20 MIN: CPT

## 2024-12-24 PROCEDURE — 96374 THER/PROPH/DIAG INJ IV PUSH: CPT

## 2024-12-24 PROCEDURE — 96375 TX/PRO/DX INJ NEW DRUG ADDON: CPT

## 2024-12-24 PROCEDURE — 99214 OFFICE O/P EST MOD 30 MIN: CPT

## 2024-12-24 RX ORDER — ONDANSETRON 2 MG/ML
4 INJECTION INTRAMUSCULAR; INTRAVENOUS ONCE
Status: COMPLETED | OUTPATIENT
Start: 2024-12-24 | End: 2024-12-24

## 2024-12-24 RX ORDER — BUTALBITAL, ACETAMINOPHEN AND CAFFEINE 50; 325; 40 MG/1; MG/1; MG/1
1-2 TABLET ORAL EVERY 6 HOURS PRN
Qty: 10 TABLET | Refills: 0 | Status: SHIPPED | OUTPATIENT
Start: 2024-12-24 | End: 2024-12-29

## 2024-12-24 RX ORDER — KETOROLAC TROMETHAMINE 30 MG/ML
30 INJECTION, SOLUTION INTRAMUSCULAR; INTRAVENOUS ONCE
Status: COMPLETED | OUTPATIENT
Start: 2024-12-24 | End: 2024-12-24

## 2024-12-24 RX ORDER — DIPHENHYDRAMINE HYDROCHLORIDE 50 MG/ML
25 INJECTION INTRAMUSCULAR; INTRAVENOUS ONCE
Status: COMPLETED | OUTPATIENT
Start: 2024-12-24 | End: 2024-12-24

## 2024-12-24 NOTE — DISCHARGE INSTRUCTIONS
Follow-up with your primary care doctor  Take Fioricet as needed for headache every 4-6 hours  Return if any worsening symptoms or new concern
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
## 2025-04-04 DIAGNOSIS — E78.00 HYPERCHOLESTEREMIA: ICD-10-CM

## 2025-04-07 RX ORDER — ROSUVASTATIN CALCIUM 10 MG/1
10 TABLET, COATED ORAL NIGHTLY
Qty: 30 TABLET | Refills: 0 | OUTPATIENT
Start: 2025-04-07

## 2025-04-07 NOTE — TELEPHONE ENCOUNTER
Last Office Visit: 07/10/2024  Last Refill:   Medication Quantity Refills Start End   ROSUVASTATIN 10 MG Oral Tab 90 tablet 0 7/25/2024 --     Return to Clinic:     Protocol: failed    Message sent for patient to complete labs. 30 day pended.

## 2025-04-12 ENCOUNTER — HOSPITAL ENCOUNTER (INPATIENT)
Facility: HOSPITAL | Age: 55
LOS: 1 days | Discharge: HOME | End: 2025-04-15
Attending: EMERGENCY MEDICINE | Admitting: INTERNAL MEDICINE
Payer: COMMERCIAL

## 2025-04-12 ENCOUNTER — APPOINTMENT (OUTPATIENT)
Dept: MRI IMAGING | Facility: HOSPITAL | Age: 55
End: 2025-04-12
Attending: EMERGENCY MEDICINE
Payer: COMMERCIAL

## 2025-04-12 ENCOUNTER — APPOINTMENT (OUTPATIENT)
Dept: GENERAL RADIOLOGY | Facility: HOSPITAL | Age: 55
End: 2025-04-12
Attending: EMERGENCY MEDICINE
Payer: COMMERCIAL

## 2025-04-12 DIAGNOSIS — M54.9 ACUTE BACK PAIN, UNSPECIFIED BACK LOCATION, UNSPECIFIED BACK PAIN LATERALITY: Primary | ICD-10-CM

## 2025-04-12 DIAGNOSIS — M51.26 LUMBAR DISC HERNIATION: ICD-10-CM

## 2025-04-12 PROCEDURE — 72110 X-RAY EXAM L-2 SPINE 4/>VWS: CPT | Performed by: EMERGENCY MEDICINE

## 2025-04-12 PROCEDURE — 72148 MRI LUMBAR SPINE W/O DYE: CPT | Performed by: EMERGENCY MEDICINE

## 2025-04-12 RX ORDER — HYDROMORPHONE HYDROCHLORIDE 1 MG/ML
0.5 INJECTION, SOLUTION INTRAMUSCULAR; INTRAVENOUS; SUBCUTANEOUS EVERY 30 MIN PRN
Refills: 0 | Status: DISCONTINUED | OUTPATIENT
Start: 2025-04-12 | End: 2025-04-13

## 2025-04-12 RX ORDER — HYDROCODONE BITARTRATE AND ACETAMINOPHEN 5; 325 MG/1; MG/1
1-2 TABLET ORAL EVERY 6 HOURS PRN
Qty: 12 TABLET | Refills: 0 | Status: SHIPPED | OUTPATIENT
Start: 2025-04-12 | End: 2025-04-23 | Stop reason: CLARIF

## 2025-04-12 RX ORDER — HYDROCODONE BITARTRATE AND ACETAMINOPHEN 5; 325 MG/1; MG/1
2 TABLET ORAL ONCE
Refills: 0 | Status: COMPLETED | OUTPATIENT
Start: 2025-04-12 | End: 2025-04-12

## 2025-04-12 RX ORDER — METHYLPREDNISOLONE 4 MG/1
TABLET ORAL
Qty: 1 EACH | Refills: 0 | Status: SHIPPED | OUTPATIENT
Start: 2025-04-12 | End: 2025-04-22

## 2025-04-12 RX ORDER — METHOCARBAMOL 500 MG/1
500 TABLET, FILM COATED ORAL 4 TIMES DAILY
Qty: 15 TABLET | Refills: 0 | Status: ON HOLD | OUTPATIENT
Start: 2025-04-12

## 2025-04-12 RX ORDER — PREDNISONE 20 MG/1
40 TABLET ORAL ONCE
Status: COMPLETED | OUTPATIENT
Start: 2025-04-12 | End: 2025-04-12

## 2025-04-12 RX ORDER — DIAZEPAM 5 MG/1
5 TABLET ORAL ONCE
Status: COMPLETED | OUTPATIENT
Start: 2025-04-12 | End: 2025-04-12

## 2025-04-12 NOTE — ED INITIAL ASSESSMENT (HPI)
Pt states that he injured his back 1.5wks ago after pulling something heavy. Pt states that he hear a \"pop\" at that time but was able to manage the pain at home. Pt states pain worsened over the past 2 days prohibiting him for ambulating . C/o pain 10/10

## 2025-04-13 PROBLEM — M54.9 ACUTE BACK PAIN, UNSPECIFIED BACK LOCATION, UNSPECIFIED BACK PAIN LATERALITY: Status: ACTIVE | Noted: 2025-04-13

## 2025-04-13 PROBLEM — M51.26 LUMBAR DISC HERNIATION: Status: ACTIVE | Noted: 2025-04-13

## 2025-04-13 LAB
ALBUMIN SERPL-MCNC: 4.8 G/DL (ref 3.2–4.8)
ALBUMIN/GLOB SERPL: 1.8 {RATIO} (ref 1–2)
ALP LIVER SERPL-CCNC: 61 U/L (ref 45–117)
ALT SERPL-CCNC: 19 U/L (ref 10–49)
ANION GAP SERPL CALC-SCNC: 13 MMOL/L (ref 0–18)
AST SERPL-CCNC: 22 U/L (ref ?–34)
BASOPHILS # BLD AUTO: 0.01 X10(3) UL (ref 0–0.2)
BASOPHILS NFR BLD AUTO: 0.1 %
BILIRUB SERPL-MCNC: 0.8 MG/DL (ref 0.3–1.2)
BUN BLD-MCNC: 14 MG/DL (ref 9–23)
CALCIUM BLD-MCNC: 9.6 MG/DL (ref 8.7–10.6)
CHLORIDE SERPL-SCNC: 107 MMOL/L (ref 98–112)
CO2 SERPL-SCNC: 19 MMOL/L (ref 21–32)
CREAT BLD-MCNC: 0.95 MG/DL (ref 0.7–1.3)
EGFRCR SERPLBLD CKD-EPI 2021: 96 ML/MIN/1.73M2 (ref 60–?)
EOSINOPHIL # BLD AUTO: 0 X10(3) UL (ref 0–0.7)
EOSINOPHIL NFR BLD AUTO: 0 %
ERYTHROCYTE [DISTWIDTH] IN BLOOD BY AUTOMATED COUNT: 13.1 %
GLOBULIN PLAS-MCNC: 2.6 G/DL (ref 2–3.5)
GLUCOSE BLD-MCNC: 130 MG/DL (ref 70–99)
HCT VFR BLD AUTO: 46.1 % (ref 39–53)
HGB BLD-MCNC: 16 G/DL (ref 13–17.5)
IMM GRANULOCYTES # BLD AUTO: 0.05 X10(3) UL (ref 0–1)
IMM GRANULOCYTES NFR BLD: 0.4 %
LYMPHOCYTES # BLD AUTO: 1.14 X10(3) UL (ref 1–4)
LYMPHOCYTES NFR BLD AUTO: 9.1 %
MCH RBC QN AUTO: 30.2 PG (ref 26–34)
MCHC RBC AUTO-ENTMCNC: 34.7 G/DL (ref 31–37)
MCV RBC AUTO: 87.1 FL (ref 80–100)
MONOCYTES # BLD AUTO: 0.12 X10(3) UL (ref 0.1–1)
MONOCYTES NFR BLD AUTO: 1 %
NEUTROPHILS # BLD AUTO: 11.24 X10 (3) UL (ref 1.5–7.7)
NEUTROPHILS # BLD AUTO: 11.24 X10(3) UL (ref 1.5–7.7)
NEUTROPHILS NFR BLD AUTO: 89.4 %
OSMOLALITY SERPL CALC.SUM OF ELEC: 290 MOSM/KG (ref 275–295)
PLATELET # BLD AUTO: 482 10(3)UL (ref 150–450)
POTASSIUM SERPL-SCNC: 4.1 MMOL/L (ref 3.5–5.1)
PROT SERPL-MCNC: 7.4 G/DL (ref 5.7–8.2)
RBC # BLD AUTO: 5.29 X10(6)UL (ref 4.3–5.7)
SODIUM SERPL-SCNC: 139 MMOL/L (ref 136–145)
WBC # BLD AUTO: 12.6 X10(3) UL (ref 4–11)

## 2025-04-13 PROCEDURE — 99222 1ST HOSP IP/OBS MODERATE 55: CPT | Performed by: INTERNAL MEDICINE

## 2025-04-13 PROCEDURE — 99222 1ST HOSP IP/OBS MODERATE 55: CPT

## 2025-04-13 RX ORDER — ONDANSETRON 2 MG/ML
4 INJECTION INTRAMUSCULAR; INTRAVENOUS EVERY 6 HOURS PRN
Status: DISCONTINUED | OUTPATIENT
Start: 2025-04-13 | End: 2025-04-15

## 2025-04-13 RX ORDER — HYDROMORPHONE HYDROCHLORIDE 1 MG/ML
0.4 INJECTION, SOLUTION INTRAMUSCULAR; INTRAVENOUS; SUBCUTANEOUS EVERY 2 HOUR PRN
Status: DISCONTINUED | OUTPATIENT
Start: 2025-04-13 | End: 2025-04-13

## 2025-04-13 RX ORDER — HYDROCODONE BITARTRATE AND ACETAMINOPHEN 5; 325 MG/1; MG/1
2 TABLET ORAL EVERY 4 HOURS PRN
Status: DISCONTINUED | OUTPATIENT
Start: 2025-04-13 | End: 2025-04-15

## 2025-04-13 RX ORDER — DEXAMETHASONE SODIUM PHOSPHATE 4 MG/ML
4 VIAL (ML) INJECTION EVERY 6 HOURS
Status: DISCONTINUED | OUTPATIENT
Start: 2025-04-14 | End: 2025-04-15

## 2025-04-13 RX ORDER — KETOROLAC TROMETHAMINE 30 MG/ML
30 INJECTION, SOLUTION INTRAMUSCULAR; INTRAVENOUS ONCE
Status: COMPLETED | OUTPATIENT
Start: 2025-04-13 | End: 2025-04-13

## 2025-04-13 RX ORDER — MORPHINE SULFATE 4 MG/ML
4 INJECTION, SOLUTION INTRAMUSCULAR; INTRAVENOUS ONCE
Status: COMPLETED | OUTPATIENT
Start: 2025-04-13 | End: 2025-04-13

## 2025-04-13 RX ORDER — HYDROMORPHONE HYDROCHLORIDE 1 MG/ML
0.8 INJECTION, SOLUTION INTRAMUSCULAR; INTRAVENOUS; SUBCUTANEOUS EVERY 2 HOUR PRN
Status: DISCONTINUED | OUTPATIENT
Start: 2025-04-13 | End: 2025-04-13

## 2025-04-13 RX ORDER — HYDROMORPHONE HYDROCHLORIDE 1 MG/ML
0.2 INJECTION, SOLUTION INTRAMUSCULAR; INTRAVENOUS; SUBCUTANEOUS EVERY 2 HOUR PRN
Status: DISCONTINUED | OUTPATIENT
Start: 2025-04-13 | End: 2025-04-13

## 2025-04-13 RX ORDER — MORPHINE SULFATE 2 MG/ML
2 INJECTION, SOLUTION INTRAMUSCULAR; INTRAVENOUS EVERY 2 HOUR PRN
Status: DISCONTINUED | OUTPATIENT
Start: 2025-04-13 | End: 2025-04-15

## 2025-04-13 RX ORDER — HYDROCODONE BITARTRATE AND ACETAMINOPHEN 5; 325 MG/1; MG/1
1 TABLET ORAL EVERY 4 HOURS PRN
Status: DISCONTINUED | OUTPATIENT
Start: 2025-04-13 | End: 2025-04-15

## 2025-04-13 RX ORDER — HEPARIN SODIUM 5000 [USP'U]/ML
5000 INJECTION, SOLUTION INTRAVENOUS; SUBCUTANEOUS EVERY 12 HOURS SCHEDULED
Status: DISCONTINUED | OUTPATIENT
Start: 2025-04-13 | End: 2025-04-15

## 2025-04-13 RX ORDER — MORPHINE SULFATE 2 MG/ML
1 INJECTION, SOLUTION INTRAMUSCULAR; INTRAVENOUS EVERY 2 HOUR PRN
Status: DISCONTINUED | OUTPATIENT
Start: 2025-04-13 | End: 2025-04-15

## 2025-04-13 RX ORDER — CYCLOBENZAPRINE HCL 10 MG
10 TABLET ORAL 3 TIMES DAILY PRN
Status: DISCONTINUED | OUTPATIENT
Start: 2025-04-13 | End: 2025-04-15

## 2025-04-13 RX ORDER — ACETAMINOPHEN 325 MG/1
650 TABLET ORAL EVERY 4 HOURS PRN
Status: DISCONTINUED | OUTPATIENT
Start: 2025-04-13 | End: 2025-04-15

## 2025-04-13 RX ORDER — MORPHINE SULFATE 4 MG/ML
4 INJECTION, SOLUTION INTRAMUSCULAR; INTRAVENOUS EVERY 2 HOUR PRN
Status: DISCONTINUED | OUTPATIENT
Start: 2025-04-13 | End: 2025-04-15

## 2025-04-13 RX ORDER — DEXAMETHASONE SODIUM PHOSPHATE 4 MG/ML
8 VIAL (ML) INJECTION ONCE
Status: COMPLETED | OUTPATIENT
Start: 2025-04-13 | End: 2025-04-13

## 2025-04-13 NOTE — PLAN OF CARE
Pt A&Ox4. VSS on RA. . Telemetry monitoring. Severe pain to lower back radiating down to left hip/groin. Norco and morphine given prn. Plan for neuro sx to see. Call light within reach. Will continue to monitor.
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
## 2025-04-13 NOTE — ED QUICK NOTES
Pt noted to have 600ml urine output at this time, pt was able to stand and take few steps but states the pain is to bad and assisted back to cart, md aware.    None

## 2025-04-13 NOTE — OCCUPATIONAL THERAPY NOTE
OT Orders received and chart reviewed. Patient with noted moderate left sided L1-L2 disc protrusion that impinges on the left L1 nerve root and left sided foraminal disc protrusion at L3-L4 abutting L3 nerve root. Neurosurgery consulted, will await recommendations before proceeding with therapy evaluation. Thank you.

## 2025-04-13 NOTE — ED QUICK NOTES
Rounding Completed    Plan of Care reviewed. Waiting for results, pt attempting to use urinal. .  Elimination needs assessed.  Provided updates.    Bed is locked and in lowest position. Call light within reach.

## 2025-04-13 NOTE — ED PROVIDER NOTES
Care was assumed at shift change with plan to follow-up on MRI results.  These results were reviewed as noted below.    MRI lumbar spine without contrast    Comparison: Plain films from the previous day.  MRI from June 1, 2020      IMPRESSION:    No evidence of fracture or acute traumatic malalignment.    Marrow signal is within normal limits.    Lumbar straightening is present.    Visualized spinal cord is unremarkable.    At L1-2, there is a moderate left-sided lateral disk protrusion extending into the neural foramen.  This likely impinges on the left L1 nerve root.  This appears to be new compared to previous MRI.  Spinal canal appears patent.    L2-3: Mild facet arthropathy.  No significant canal or foraminal narrowing.    L3-4: Left-sided foraminal disc protrusion, which appears new compared to previous.  There is associated mild foraminal narrowing.  Disc likely abuts the left L3 nerve root.  Mild facet arthropathy.  No significant central canal narrowing.    L4-5: Apparent annular tear.  Annular disk bulge.  Facet and ligamentum flavum hypertrophy.  No significant central canal narrowing.  No significant foraminal narrowing.    L5-S1: No significant canal or foraminal narrowing.    Paraspinous soft tissues are unremarkable.  Mild bladder distention.  Correlate for clinical evidence of urinary retention.    The patient appears to have a moderate left sided lateral disc protrusion at L1-L2 that impinges on the left L1 nerve root which is new.  Left-sided foraminal disc protrusion new at L3-L4 abutting the left L3 nerve root.      Annular tear L4-L5 and annular disc bulge.    Per nursing staff, patient has been able to urinate.  He was just given a urinal and will measure postvoid residual to ensure he does not have urinary retention.  No lower extremity weakness or loss of sensation.    The patient was able to urinate a small amount, postvoid residual was obtained.  This was approximately 500 mL.  The patient  states he has difficulty relaxing as he is unable to stand to urinate at this time.  Ultimately the patient was able to urinate approximately 600 mL dependently.  No evidence for retention.    After discussion regarding all test results with patient, he was uncertain if he be able to go home due to persistent pain.  He states that the Dilaudid has had minimal improvement in his symptoms.  He believes that morphine may provide him more relief.  This was given in the ED.  He was still complaining of significant pain with attempting to stand.  He does not feel comfortable going home at this time due to his persistent severe pain.  Therefore case was discussed with Dr. Guevara.  Will consult orthospine.  Notified via [employer], message received by Dr. George

## 2025-04-13 NOTE — PHYSICAL THERAPY NOTE
PT Orders received, chart reviewed. Pending neurosurgery consultation for L1-4 intervention recommendations prior to PT therapy evaluation.
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
## 2025-04-13 NOTE — PLAN OF CARE
NURSING ADMISSION NOTE      Patient admitted via Cart  Oriented to room.  Safety precautions initiated.  Bed in low position.  Call light in reach.    Patient A&Ox4. VSS. RA. Skin check completed with Ashley PCT. No skin breakdown noted. Fall precautions in place. Call light in reach.

## 2025-04-13 NOTE — ED QUICK NOTES
Orders for admission, patient is aware of plan and ready to go upstairs. Any questions, please call ED RN Cindy at extension [phone].     Patient Covid vaccination status: Fully vaccinated     COVID Test Ordered in ED: None    COVID Suspicion at Admission: N/A    Running Infusions: Medication Infusions[1] None    Mental Status/LOC at time of transport: A&OX3, no neuro deficits    Other pertinent information:   CIWA score: N/A   NIH score:  N/A             [1]
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
## 2025-04-14 ENCOUNTER — TELEPHONE (OUTPATIENT)
Dept: PAIN CLINIC | Facility: CLINIC | Age: 55
End: 2025-04-14

## 2025-04-14 DIAGNOSIS — M54.16 LUMBAR RADICULITIS: Primary | ICD-10-CM

## 2025-04-14 LAB
ANION GAP SERPL CALC-SCNC: 11 MMOL/L (ref 0–18)
BASOPHILS # BLD AUTO: 0.02 X10(3) UL (ref 0–0.2)
BASOPHILS NFR BLD AUTO: 0.1 %
BUN BLD-MCNC: 16 MG/DL (ref 9–23)
CALCIUM BLD-MCNC: 9.5 MG/DL (ref 8.7–10.6)
CHLORIDE SERPL-SCNC: 105 MMOL/L (ref 98–112)
CO2 SERPL-SCNC: 23 MMOL/L (ref 21–32)
CREAT BLD-MCNC: 1.03 MG/DL (ref 0.7–1.3)
EGFRCR SERPLBLD CKD-EPI 2021: 87 ML/MIN/1.73M2 (ref 60–?)
EOSINOPHIL # BLD AUTO: 0 X10(3) UL (ref 0–0.7)
EOSINOPHIL NFR BLD AUTO: 0 %
ERYTHROCYTE [DISTWIDTH] IN BLOOD BY AUTOMATED COUNT: 13.5 %
GLUCOSE BLD-MCNC: 152 MG/DL (ref 70–99)
HCT VFR BLD AUTO: 48.1 % (ref 39–53)
HGB BLD-MCNC: 16.2 G/DL (ref 13–17.5)
IMM GRANULOCYTES # BLD AUTO: 0.05 X10(3) UL (ref 0–1)
IMM GRANULOCYTES NFR BLD: 0.4 %
LYMPHOCYTES # BLD AUTO: 1.04 X10(3) UL (ref 1–4)
LYMPHOCYTES NFR BLD AUTO: 7.4 %
MCH RBC QN AUTO: 30.3 PG (ref 26–34)
MCHC RBC AUTO-ENTMCNC: 33.7 G/DL (ref 31–37)
MCV RBC AUTO: 90.1 FL (ref 80–100)
MONOCYTES # BLD AUTO: 0.29 X10(3) UL (ref 0.1–1)
MONOCYTES NFR BLD AUTO: 2.1 %
NEUTROPHILS # BLD AUTO: 12.68 X10 (3) UL (ref 1.5–7.7)
NEUTROPHILS # BLD AUTO: 12.68 X10(3) UL (ref 1.5–7.7)
NEUTROPHILS NFR BLD AUTO: 90 %
OSMOLALITY SERPL CALC.SUM OF ELEC: 292 MOSM/KG (ref 275–295)
PLATELET # BLD AUTO: 503 10(3)UL (ref 150–450)
POTASSIUM SERPL-SCNC: 4.3 MMOL/L (ref 3.5–5.1)
RBC # BLD AUTO: 5.34 X10(6)UL (ref 4.3–5.7)
SODIUM SERPL-SCNC: 139 MMOL/L (ref 136–145)
WBC # BLD AUTO: 14.1 X10(3) UL (ref 4–11)

## 2025-04-14 PROCEDURE — 99232 SBSQ HOSP IP/OBS MODERATE 35: CPT | Performed by: INTERNAL MEDICINE

## 2025-04-14 PROCEDURE — 99223 1ST HOSP IP/OBS HIGH 75: CPT | Performed by: ANESTHESIOLOGY

## 2025-04-14 PROCEDURE — 99232 SBSQ HOSP IP/OBS MODERATE 35: CPT | Performed by: NEUROLOGICAL SURGERY

## 2025-04-14 RX ORDER — TRAZODONE HYDROCHLORIDE 50 MG/1
TABLET ORAL NIGHTLY
COMMUNITY
Start: 2025-04-08 | End: 2025-04-15

## 2025-04-14 NOTE — PLAN OF CARE
Pt A&Ox4. VSS on RA. . Telemetry monitoring. Moderate pain to lower back radiating down LLE. Norco and flexeril given prn for pain relief. Plan for DIPIKA tomorrow with Dr. Oconnor. NPO midnight. Call light within reach. Will continue to monitor.

## 2025-04-14 NOTE — PHYSICAL THERAPY NOTE
Order received, chart reviewed. Communicated with RN. Pt in 10/10 pain with any mobility. Will await pain consult/possible DIPIKA. Will hold and follow as appropriate.     Estephania Hunt, PT, DPT  04/14/25

## 2025-04-14 NOTE — PROGRESS NOTES
[facility]   part of [facility]    Neurosurgery Progress Note  2025    Shin Liang Patient Status:  Observation    1970 MRN [id number]   Location [facility] 3SW-A Attending Isaac Olmedo MD   Hosp Day # 0 PCP Hemal Martines MD     SUBJECTIVE:  Shin Liang is a(n) 54 year old male evaluated today in hospital follow-up.  He continues to endorse left upper buttock and groin pain.  No significant radiation beyond the hip joint.      OBJECTIVE / PHYSICAL EXAM:  Vital Signs:  Blood pressure 136/88, pulse 62, temperature 97.6 °F (36.4 °C), temperature source Oral, resp. rate 16, weight 164 lb 14.5 oz (74.8 kg), SpO2 95%.  On examination, there is some pain limitation in iliopsoas.  Strength is otherwise 5/5 throughout.      Lab Results (last 24 hours):  Recent Results (from the past 24 hours)   Basic Metabolic Panel (8)    Collection Time: 25  4:44 AM   Result Value Ref Range    Glucose 152 (H) 70 - 99 mg/dL    Sodium 139 136 - 145 mmol/L    Potassium 4.3 3.5 - 5.1 mmol/L    Chloride 105 98 - 112 mmol/L    CO2 23.0 21.0 - 32.0 mmol/L    Anion Gap 11 0 - 18 mmol/L    BUN 16 9 - 23 mg/dL    Creatinine 1.03 0.70 - 1.30 mg/dL    Calcium, Total 9.5 8.7 - 10.6 mg/dL    Calculated Osmolality 292 275 - 295 mOsm/kg    eGFR-Cr 87 >=60 mL/min/1.73m2   CBC With Differential With Platelet    Collection Time: 25  4:44 AM   Result Value Ref Range    WBC 14.1 (H) 4.0 - 11.0 x10(3) uL    RBC 5.34 4.30 - 5.70 x10(6)uL    HGB 16.2 13.0 - 17.5 g/dL    HCT 48.1 39.0 - 53.0 %    .0 (H) 150.0 - 450.0 10(3)uL    MCV 90.1 80.0 - 100.0 fL    MCH 30.3 26.0 - 34.0 pg    MCHC 33.7 31.0 - 37.0 g/dL    RDW 13.5 %    Neutrophil Absolute Prelim 12.68 (H) 1.50 - 7.70 x10 (3) uL    Neutrophil Absolute 12.68 (H) 1.50 - 7.70 x10(3) uL    Lymphocyte Absolute 1.04 1.00 - 4.00 x10(3) uL    Monocyte Absolute 0.29 0.10 - 1.00 x10(3) uL    Eosinophil Absolute 0.00 0.00 - 0.70 x10(3) uL     Basophil Absolute 0.02 0.00 - 0.20 x10(3) uL    Immature Granulocyte Absolute 0.05 0.00 - 1.00 x10(3) uL    Neutrophil % 90.0 %    Lymphocyte % 7.4 %    Monocyte % 2.1 %    Eosinophil % 0.0 %    Basophil % 0.1 %    Immature Granulocyte % 0.4 %       Review of Imaging  MRI scan reviewed.  This demonstrates left L1-2 and left L3-4 foraminal/far lateral disc herniations.    Assessment/Plan:  1.  Lumbar disc herniation with intractable radiculopathy.  Based on the pattern of his pain, it appears the L1-2 lesion is more symptomatic.    We discussed available surgical and nonsurgical treatment options.  Given the relatively recent onset of his symptoms, I have recommended he proceed with transforaminal epidural steroid injection.    Appreciate input from pain management.  We will follow.        4/14/2025  11:30 AM    This report was dictated using voice recognition technology.  Errors in syntax or recognition may occur, and should not be construed to change the meaning of a sentence.
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
## 2025-04-14 NOTE — DISCHARGE INSTRUCTIONS
Home Care Instructions Following Your Pain Procedure     Shin,  It has been a pleasure to have you as our patient. To help you at home, you must follow these general discharge instructions. We will review these with you before you are discharged. It is our hope that you have a complete and uneventful recovery from our procedure.     General Instructions:  What to Expect:    Bandages from your procedure today can be removed when you get home.  Please avoid soaking and/or swimming for 24 hours.  Showering is okay  It is normal to have increased pain symptoms and/or pain at injection site for up to 3-5 days after procedure, you can use heat or ice (20 minutes on 20 minutes off) for comfort.  You may experience some temporary side effects which may include restlessness or insomnia, flushing of the face, or heart palpitations.  Please contact the provider if these symptoms do not resolve within 3-4 days.  Lightheadedness or nausea may occur and should resolve within 24 to 48 hours.  If you develop a headache after treatment, rest, drink fluids (with caffeine, if possible) and take mild over-the-counter pain medication.  If the headache does not improve with the above treatment, contact the physician.  Home Medications:  Resume all previously prescribed medication.  Please avoid taking NSAIDs (Non-Steriodal Anti-Inflammatory Drugs) such as:  Ibuprofen ( Advil, Motrin) Aleve (Naproxen), Diclofenac, Meloxicam for 6 hours after procedure.   If you are on Coumadin (Warfarin) or any other anti-coagulant (or \"blood thinning\") medication such as Plavix (Clopidogrel), Xarelto (Rivaroxaban), Eliquis (Apixaban), Effient (Prasugrel) etc., restart on the following day from the procedure unless otherwise directed by your provider.  If you are a diabetic, please increase the frequency of your glucose monitoring after the procedure as steroids may cause a temporary (2-3 day) increase in your blood sugar.  Contact your primary care  physician if your blood sugar remains elevated as you may require some medication adjustment.  Diet:  Resume your regular diet as tolerated.  Activity:  We recommend that you relax and rest during the rest of your procedure day.  If you feel weakness in your arms or legs do not drive.  Follow-up Appointment  Please schedule a follow-up visit within 3 to 4 weeks after your last procedure date.  Question or Concerns:  Feel free to call our office with any questions or concerns at [phone] (option #2)    Shin  Thank you for coming to [facility] for your procedure.  The nurses try very hard to make sure you receive the best care possible.  Your trust in them as well as us is greatly appreciated.    Thanks so much,   Dr. Elroy Oconnor    Virtual Divorce Support Groups by: [facility] for Divorce Resources  Next Steps:    Call [phone] to get more info.      Email [email or web address] to get more info.   About: We offer Support Groups for every stage of the process: contemplating divorce, in the process, and life after divorce (post decree).    This program provides:    - Divorce Support Groups    Our 8 to 10 week groups are led by Licensed Clinical Social Workers who offer a therapeutic, compassionate and supportive environment. All support groups take place in a safe non-judgmental environment where participants share their experiences and learn from each other via Zoom (rooms are locked and never recorded).    Support groups are single gender and we welcome people who are transgender.    Contact us if you are interested in being on a wait list. Through our Scholarship Fund, fee assistance is available if needed.  Nearest location: 30.01 miles away.  [facility] [address]  [address] [place] [address]   [phone]   Note: We [facility] all [facility] [place] Groups for Contemplating, In the Process or  are being held remotely at present - but typically held in  [facility] and [place].  Hours:  Monday:09:00 AM - 05:00 PM  Tuesday:09:00 AM - 05:00 PM  Wednesday:09:00 AM - 05:00 PM  Thursday:09:00 AM - 05:00 PM  Friday:09:00 AM - 05:00 PM  [place] Support Group by: [employer]  Next Steps:    Call [phone] to get more info.      Email [email or web address] to get more info.   About: [employer] hosts a support group for men that allow people to share experiences and concerns with others who understand in a safe, confidential, and compassionate setting.    This program provides:    - Support groups  - Virtual support    To learn more information about this support group, classes are available on Zoom on Saturdays.    Meeting ID: [phone]  Passcode: [email or web address]  Eligibility: This program serves men 18 years of age and over.  Nearest location: 690.17 miles away.  [employer]  [address] [place] [address]   [phone]  Hours:  Saturday:10:00 AM - 11:00 AM  Counseling Services by: [facility]  Next Steps:    Call [phone] to get more info.   About: [facility] works with a wide range of emotional and behavioral issues providing services that span from therapy for depression and grief counseling to parenting support, couples counseling and beyond. In a comfortable and supportive atmosphere, we offer a highly personalized approach tailored to each of our clients' individual needs to help attain the personal growth they’re striving for.    Treatment specialization includes:    - Therapy for Depression, Anxiety, PTSD, Trauma  - Individual (Child, Adolescent, Adult), couples, and family counseling  - Parenting Support  - Grief Counseling  - Work and Career Issues  - Stress Management  - Addiction & Recovery  - Conflict Resolution  - ADHD  - LGBTQ+  - Combat/ PTSD  - School Related Issues    We accept Medicaid, Medicare, and private insurances. Reduced fee services are  available on a limited basis.    Please call, text or email us for an individual, couples or family therapy consultation today.  Nearest location: 8.57 miles away.  [facility] [place] [address]  [address]  [place] [address]    ext.  Hours:  Sunday:08:00 AM - 04:00 PM  Monday:08:00 AM - 09:00 PM  Tuesday:08:00 AM - 09:00 PM  Wednesday:08:00 AM - 09:00 PM  Thursday:08:00 AM - 09:00 PM  Friday:08:00 AM - 08:00 PM  Saturday:08:00 AM - 04:00 PM  Online Courses by: [facility] Divorce Resources  Next Steps:    Go to [email or web address] to get more info.   About: The [employer] offers online courses for individuals going through divorce. The program's educational workshops, self-paced online courses and webinars help clients learn deeply and objectively about legal, financial and emotional topics related to separation and divorce.    Courses are self-paced and can be viewed for up to 14 days on a computer, tablet or smartphone. Each course includes printable content.    Services offered:    - Divorce education and support    For more details about our courses, please visit our website.  Eligibility: We serve individuals who are experiencing divorce.  Nearest location: 30.01 miles away.  [facility] [employer]  [address] [place] [address]   [phone]  Hours:  Friday:09:00 AM - 05:00 PM  Personalized Counseling Sessions by: [facility] Divorce Resources  Next Steps:    Call [phone] to schedule an appointment.   About: During a 60-minute private and confidential meeting with a trained [employer] counselor, individuals contemplating or at the beginning stages of separation or divorce receive guidance to help understand the legal, financial and emotional considerations involved in separation and divorce.    During your sessions, you can talk privately about:    - Where you are in the decision making process  -  Factors to consider  - Your family situation  - How to prepare  - How to talk with your spouse  - How tell your children and relatives  - When and how to hire professionals    You will come away with a better understanding of your options, rights, and goals. We will provide you with professional referrals, literature and other resources necessary to help you at each step of the way.    [facility] counselors provide legal information NOT legal advice. Appointments are available for anyone regardless of gender identity.    The first session is $90. Additional sessions are $50. Fee assistance is available through our [employer] Fund.  Nearest location: 30.01 miles away.  [facility]  [address] [place] [address]   [phone]  Hours:  Monday:09:00 AM - 05:00 PM  Tuesday:09:00 AM - 05:00 PM  Wednesday:09:00 AM - 05:00 PM  Thursday:09:00 AM - 05:00 PM  Friday:09:00 AM - 05:00 PM  Free Friday - Ask a  by: [facility] for Divorce Resources  Next Steps:    Call [phone] to get services.      Go to [email or web address] to get more info.   About: On most Fridays, [facility] offers a free helpline for general legal questions and assistance to individuals who need to speak to a . We encourage families and individuals who are contemplating separation or divorce, in the process or post decree, or somewhere in between to reach out and speak about any legal concerns or issues you have.    Services offered:    - Free legal information from Family Law Attorneys    For more information, please call fridays 9am - 12pm  Eligibility: Anyone can access this program.  Nearest location: 30.01 miles away.  [facility]  [address] [place] [address]   [phone]  Hours:  Friday:09:00 AM - 12:00 PM  Referral Services by: [facility] for Divorce Resources  Next Steps:    Call [phone] to get services.   About: [facility]  offers referrals for any stage of separation and divorce - contemplating, during the process and for life after steps. Our referrals are tailored to your situation as best as possible and are typically given in person during our Personalized Sessions or over the phone for existing [facility] clients.    Below is just a sample of our referral types.    - Financial Professionals  - Legal Professionals  - Family/Relationship/Mental Health Professionals  - Other: Professional Organizers, Real Estate Agents, Non Profit Support Groups    Call us for more information, help figuring out who you need to talk to or if you need someone that is not listed - we will help you find them.  Eligibility: Anyone can access this program.  Nearest location: 30.01 miles away.  [facility] [email or web address]  [address] [place] [address] [address]  [place] [address]   [phone]  Hours:  Monday:08:00 AM - 05:00 PM  Tuesday:08:00 AM - 05:00 PM  Wednesday:08:00 AM - 05:00 PM  Thursday:08:00 AM - 05:00 PM  Friday:08:00 AM - 05:00 PM  Counseling Services - Adult Services by: [facility]  Next Steps:    Call [phone] to get more info.   About: Counseling Services - Adult Services program provides and restores hope to those with stress at work, the loss of a loved one, a kid in trouble, divorce or separation through individual, group, couple or family counseling.  Nearest location: 2.92 miles away.  [facility]  [address] [place] [address]  [address]  [place] [address]   [phone]  Hours:  Monday:08:00 AM - 05:00 PM  Tuesday:08:00 AM - 05:00 PM  Wednesday:08:00 AM - 05:00 PM  Thursday:08:00 AM - 05:00 PM  Friday:08:00 AM - 05:00 PM  Adult Counseling Support Services by: [facility]  Next Steps:    Schedule on their website, [email or web address]      Call [phone] (your nearest office) to schedule an appointment.   About: [facility] offers a wide variety of  client-focused and goal-oriented counseling support in a broad range of specialty areas so that clients can find the right emotional, behavioral and spiritual balance they are seeking.    This program provides:    - Professional Counseling    Specialty areas include, but are not limited to:    - Autism Spectrum Disorder  - Depression  - Marriage, couples communication, and family conflict  - Grief and loss  - Anxiety and panic disorders  - Eating disorders  - Men’s and women’s issues  - Stress  - Process Addiction (gambling, liz, eating, exercise, etc.)  - Family violence and abuse  - Parenting  - Pre-Marital assessments  - Sexual dysfunction  - Elder care and relations  - Adult children of alcoholics  - Emotional and spiritual growth  - Divorce, remarriage and blended families    To make an appointment, you can call or contact by email via the website.  Nearest location: 2.92 miles away.  [address] [address]   [phone]  Hours:  Monday:08:00 AM - 05:00 PM  Tuesday:08:00 AM - 05:00 PM  Wednesday:08:00 AM - 05:00 PM  Thursday:08:00 AM - 05:00 PM  Friday:08:00 AM - 05:00 PM

## 2025-04-14 NOTE — OCCUPATIONAL THERAPY NOTE
OT orders received, chart reviewed. Per RN, patient in 10/10 pain with movement, awaiting plan of care decisions regarding possible LESI. Will hold at this time and reattempt as able and as patient appropriate.     Addendum: Spoke with RN later in day, reports patient now with plan for LESI tomorrow, patient declining therapy today due to pain. RN aware can notify therapy team should patient change his mind and be agreeable to therapy today. Will continue to follow.
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
## 2025-04-14 NOTE — PLAN OF CARE
Patient A&Ox4. VSS on RA. Tele; NSR, ST at times. No pain reported at this time. Voiding via primo fit. Kept patient NPO at midnight for possible procedure. Plan to see Dr. Oconnor tomorrow for possible DIPIKA. PT/OT to see. Safety precautions in place, call light within reach. Patient updated on plan of care.

## 2025-04-14 NOTE — PROGRESS NOTES
[facility]   part of [facility]     Hospitalist Progress Note     Shin Liang Patient Status:  Observation    1970 MRN [id number]   Location [facility] 3SW-A Attending Isaac Olmedo MD   Hosp Day # 0 PCP Hemal Martines MD     Chief Complaint: back pain    Subjective:     Patient without acute events overnight. Pain is about the same. Awaiting DIPIKA.     Objective:    Review of Systems:   A comprehensive review of systems was completed; pertinent positive and negatives stated in subjective.    Vital signs:  Temp:  [97 °F (36.1 °C)-97.9 °F (36.6 °C)] 97.6 °F (36.4 °C)  Pulse:  [62-95] 62  Resp:  [16-18] 16  BP: (136-150)/(86-96) 136/88  SpO2:  [93 %-96 %] 95 %    Physical Exam:    General: No acute distress  Respiratory: No wheezes, no rhonchi  Cardiovascular: S1, S2, regular rate and rhythm  Abdomen: Soft, Non-tender, non-distended, positive bowel sounds  Neuro: No new focal deficits.   Extremities: No edema      Diagnostic Data:    Labs:  Recent Labs   Lab 25  0452 25  0444   WBC 12.6* 14.1*   HGB 16.0 16.2   MCV 87.1 90.1   .0* 503.0*       Recent Labs   Lab 25  0452 25  0444   * 152*   BUN 14 16   CREATSERUM 0.95 1.03   CA 9.6 9.5   ALB 4.8  --     139   K 4.1 4.3    105   CO2 19.0* 23.0   ALKPHO 61  --    AST 22  --    ALT 19  --    BILT 0.8  --    TP 7.4  --        Estimated Glomerular Filtration Rate: 87 mL/min/1.73m2 (result from lab).    No results for input(s): \"TROP\", \"TROPHS\", \"CK\" in the last 168 hours.    No results for input(s): \"PTP\", \"INR\" in the last 168 hours.               Microbiology    No results found for this visit on 25.      Imaging: Reviewed in Epic.    Medications: Scheduled Medications[1]    Assessment & Plan:      #Intractable low back pain due to lumbar disc prolapse L1-L2 with left L1 nerve root impingement, left-sided foraminal disc protrusion L3-4 which is new, annular tear L4-L5  Pain  management with oral and IV pain medications as needed, Lidoderm patch for local application  Ice for local application  Awaiting DIPIKA  PT OT eval  #Hyperlipidemia  Will plan to continue home medication  #Anxiety, depression  Will plan to continue home medication      Isaac Olmedo MD    Supplementary Documentation:     Quality:  DVT Mechanical Prophylaxis:   SCDs,    DVT Pharmacologic Prophylaxis   Medication    [Held by provider] heparin (Porcine) 5000 UNIT/ML injection 5,000 Units                Code Status: Full Code  Moody: External urinary catheter in place  Moody Duration (in days):   Central line:    NICOLA:     Discharge is dependent on: progress  At this point Mr. Liang is expected to be discharge to: home    The 21st Century Cures Act makes medical notes like these available to patients in the interest of transparency. Please be advised this is a medical document. Medical documents are intended to carry relevant information, facts as evident, and the clinical opinion of the practitioner. The medical note is intended as peer to peer communication and may appear blunt or direct. It is written in medical language and may contain abbreviations or verbiage that are unfamiliar.                         [1]    [Held by provider] heparin  5,000 Units Subcutaneous 2 times per day    lidocaine-menthol  1 patch Transdermal Daily    dexamethasone  4 mg Intravenous Q6H

## 2025-04-15 ENCOUNTER — APPOINTMENT (OUTPATIENT)
Dept: GENERAL RADIOLOGY | Facility: HOSPITAL | Age: 55
End: 2025-04-15
Attending: ANESTHESIOLOGY
Payer: COMMERCIAL

## 2025-04-15 VITALS
HEART RATE: 73 BPM | OXYGEN SATURATION: 96 % | TEMPERATURE: 98 F | SYSTOLIC BLOOD PRESSURE: 154 MMHG | DIASTOLIC BLOOD PRESSURE: 88 MMHG | RESPIRATION RATE: 16 BRPM | BODY MASS INDEX: 24 KG/M2 | WEIGHT: 164.88 LBS

## 2025-04-15 PROCEDURE — 99239 HOSP IP/OBS DSCHRG MGMT >30: CPT | Performed by: INTERNAL MEDICINE

## 2025-04-15 PROCEDURE — 64483 NJX AA&/STRD TFRM EPI L/S 1: CPT | Performed by: ANESTHESIOLOGY

## 2025-04-15 PROCEDURE — 3E0R33Z INTRODUCTION OF ANTI-INFLAMMATORY INTO SPINAL CANAL, PERCUTANEOUS APPROACH: ICD-10-PCS | Performed by: ANESTHESIOLOGY

## 2025-04-15 PROCEDURE — 64484 NJX AA&/STRD TFRM EPI L/S EA: CPT | Performed by: ANESTHESIOLOGY

## 2025-04-15 PROCEDURE — 99232 SBSQ HOSP IP/OBS MODERATE 35: CPT | Performed by: NEUROLOGICAL SURGERY

## 2025-04-15 RX ORDER — SODIUM CHLORIDE 9 MG/ML
INJECTION, SOLUTION INTRAMUSCULAR; INTRAVENOUS; SUBCUTANEOUS
Status: DISCONTINUED | OUTPATIENT
Start: 2025-04-15 | End: 2025-04-15 | Stop reason: HOSPADM

## 2025-04-15 RX ORDER — BUPROPION HYDROCHLORIDE 150 MG/1
150 TABLET ORAL DAILY
Status: DISCONTINUED | OUTPATIENT
Start: 2025-04-15 | End: 2025-04-15

## 2025-04-15 RX ORDER — AMPHETAMINE 20 MG/1
20 TABLET, EXTENDED RELEASE ORAL EVERY MORNING
Refills: 0 | Status: DISCONTINUED | OUTPATIENT
Start: 2025-04-15 | End: 2025-04-15

## 2025-04-15 RX ORDER — SODIUM CHLORIDE, SODIUM LACTATE, POTASSIUM CHLORIDE, CALCIUM CHLORIDE 600; 310; 30; 20 MG/100ML; MG/100ML; MG/100ML; MG/100ML
100 INJECTION, SOLUTION INTRAVENOUS CONTINUOUS
Status: DISCONTINUED | OUTPATIENT
Start: 2025-04-15 | End: 2025-04-15

## 2025-04-15 RX ORDER — DEXAMETHASONE SODIUM PHOSPHATE 10 MG/ML
INJECTION, SOLUTION INTRAMUSCULAR; INTRAVENOUS
Status: DISCONTINUED | OUTPATIENT
Start: 2025-04-15 | End: 2025-04-15 | Stop reason: HOSPADM

## 2025-04-15 RX ORDER — BUPROPION HYDROCHLORIDE 300 MG/1
300 TABLET ORAL DAILY
Status: DISCONTINUED | OUTPATIENT
Start: 2025-04-15 | End: 2025-04-15

## 2025-04-15 RX ORDER — NALOXONE HYDROCHLORIDE 0.4 MG/ML
0.08 INJECTION, SOLUTION INTRAMUSCULAR; INTRAVENOUS; SUBCUTANEOUS AS NEEDED
Status: DISCONTINUED | OUTPATIENT
Start: 2025-04-15 | End: 2025-04-15 | Stop reason: HOSPADM

## 2025-04-15 RX ORDER — ROSUVASTATIN CALCIUM 5 MG/1
10 TABLET, COATED ORAL NIGHTLY
Status: DISCONTINUED | OUTPATIENT
Start: 2025-04-15 | End: 2025-04-15

## 2025-04-15 RX ORDER — LIDOCAINE HYDROCHLORIDE 10 MG/ML
INJECTION, SOLUTION EPIDURAL; INFILTRATION; INTRACAUDAL; PERINEURAL
Status: DISCONTINUED | OUTPATIENT
Start: 2025-04-15 | End: 2025-04-15 | Stop reason: HOSPADM

## 2025-04-15 NOTE — PROGRESS NOTES
[facility]   part of formerly [facility]    Neurosurgery Progress Note  4/15/2025    Shin Liang Patient Status:  Inpatient    1970 MRN [id number]   Location [facility] 3SW-A Attending Isaac Olmedo MD   Hosp Day # 1 PCP Hemal Martines MD     SUBJECTIVE:  Shin Liang is a(n) 54 year old male evaluated today in hospital follow-up.  He underwent an epidural steroid injection earlier today.  He reports some early improvement from this.      OBJECTIVE / PHYSICAL EXAM:  Vital Signs:  Blood pressure 154/88, pulse 73, temperature 98 °F (36.7 °C), temperature source Oral, resp. rate 16, weight 164 lb 14.5 oz (74.8 kg), SpO2 96%.  Strength is good.  He is standing and ambulating with physical therapy.      Lab Results (last 24 hours):  No results found for this or any previous visit (from the past 24 hours).    Review of Imaging  No new imaging    Assessment/Plan:  1.  Left lumbar radiculopathy secondary to disc herniation.  Overall, the patient appears to be experiencing improvement after the lumbar epidural steroid injection.    Okay for discharge from a neurosurgical standpoint.    Activity recommendations reviewed.    I would like to see him back in the office in 1 to 2 weeks, to check on his progress.    Neurosurgery will sign off.  Please call if there are any further questions.        4/15/2025  4:23 PM    This report was dictated using voice recognition technology.  Errors in syntax or recognition may occur, and should not be construed to change the meaning of a sentence.

## 2025-04-15 NOTE — PHYSICAL THERAPY NOTE
PHYSICAL THERAPY EVALUATION - INPATIENT     Room Number: 371/371-A  Evaluation Date: 4/15/2025  Type of Evaluation: Initial  Physician Order: PT Eval and Treat    Presenting Problem: Low back pain, left L1-2 and left L3-4 foraminal/far lateral disc herniations, s/p LESI 4/15  Co-Morbidities : HLD, anxiety, depression, discectomy   Reason for Therapy: Mobility Dysfunction and Discharge Planning    PHYSICAL THERAPY ASSESSMENT   Patient is a 54 year old male admitted 2025 for Low back pain, left L1-2 and left L3-4 foraminal/far lateral disc herniations, s/p LESI 4/15.   Patient is currently functioning near baseline with bed mobility, transfers, gait, and stair negotiation. Prior to admission, patient's baseline is independent.     Patient will benefit from continued skilled PT Services at discharge to promote prior level of function and safety with additional support and return home with OP PT if approved by neurosx.     PLAN  Patient has been evaluated and presents with no skilled Physical Therapy needs at this time.  Patient discharged from Physical Therapy services.  Please re-order if a new functional limitation presents during this admission.         GOALS  Patient was able to achieve the following goals ...    Patient was able to transfer Safely and independently   Patient able to ambulate on level surfaces Safely and independently     HOME SITUATION  Type of Home: House  Home Layout: Two level  Stairs to Enter : 2        Stairs to Bedroom: 14    Railing: Yes    Lives With: Spouse (kids)    Drives: Yes         Prior Level of Amelia: Pt typically indep with ADLs and mobility. Driving son out to college in [place] this week.     SUBJECTIVE  \"When I stand I feel it in my left leg\"     OBJECTIVE  Precautions: Spine  Fall Risk: Standard fall risk    WEIGHT BEARING RESTRICTION     PAIN ASSESSMENT  Ratin  Location: back  Management Techniques: Body mechanics    COGNITION  Overall Cognitive Status:   WFL - within functional limits    RANGE OF MOTION AND STRENGTH ASSESSMENT  See OT note for UE assessment    Lower extremity ROM is within functional limits      Lower extremity strength is within functional limits     BALANCE  Static Sitting: Good  Dynamic Sitting: Good  Static Standing: Fair -  Dynamic Standing: Fair -    ADDITIONAL TESTS                                    ACTIVITY TOLERANCE                         O2 WALK       NEUROLOGICAL FINDINGS                        AM-PAC '6-Clicks' INPATIENT SHORT FORM - BASIC MOBILITY  How much difficulty does the patient currently have...  Patient Difficulty: Turning over in bed (including adjusting bedclothes, sheets and blankets)?: None   Patient Difficulty: Sitting down on and standing up from a chair with arms (e.g., wheelchair, bedside commode, etc.): None   Patient Difficulty: Moving from lying on back to sitting on the side of the bed?: None   How much help from another person does the patient currently need...   Help from Another: Moving to and from a bed to a chair (including a wheelchair)?: None   Help from Another: Need to walk in hospital room?: None   Help from Another: Climbing 3-5 steps with a railing?: None       AM-PAC Score:  Raw Score: 24   Approx Degree of Impairment: 0%   Standardized Score (AM-PAC Scale): 61.14   CMS Modifier (G-Code): CH    FUNCTIONAL ABILITY STATUS  Gait Assessment   Functional Mobility/Gait Assessment  Gait Assistance: Modified independent  Distance (ft): 200  Assistive Device: Rolling walker  Pattern: Within Functional Limits  Stairs: Stairs  How Many Stairs: 4  Device: 2 Rails  Assist: Modified independent  Pattern: Ascend and Descend  Ascend and Descend : Step to    Skilled Therapy Provided     Bed Mobility:  Rolling: NT  Supine to sit: ind   Sit to supine: ind     Transfer Mobility:  Sit to stand: ind   Stand to sit: ind  Gait = mod I    Therapist's comments:RN cleared for session. Pt agreeable for therapy, received up in  chair. Pt trained on log roll technique for supine<>sit transfer. Educated on RW sequencing for optimal support of LLE as well as proper sizing. RW to be issued to pt from dept. Trained on step to pattern on stairs for optimal support. Trainend on UE/LE placement for STS. Instructed to call for nursing staff for any needs and OOB mobility.     Exercise/Education Provided:  Bed mobility  Body mechanics  Energy conservation  Functional activity tolerated  Gait training  Posture  Transfer training    Patient End of Session: Up in chair, Needs met, Call light within reach, RN aware of session/findings, All patient questions and concerns addressed, Hospital anti-slip socks, Discussed recommendations with /    Patient Evaluation Complexity Level:  History High - 3 or more personal factors and/or co-morbidities   Examination of body systems Low -  addressing 1-2 elements   Clinical Presentation Low- Stable   Clinical Decision Making Low Complexity       PT Session Time: 25 minutes  Gait Training: 15 minutes  Therapeutic Activity: 10 minutes

## 2025-04-15 NOTE — DISCHARGE SUMMARY
[facility]     Shin Liang Patient Status:  Inpatient    1970 MRN [id number]   Location [facility] 3SW-A Attending Isaac Olmedo MD   Hosp Day # 1 PCP Hemal Martines MD     Date of Admission: 2025  Date of Discharge:   4/15/2025      Discharge Disposition: Home or Self Care    Discharge Diagnosis:  Intractable Back Pain  Lumbar Disc Prolapse  DL  Anxiety    History of Present Illness: Shin Liang is a 54 year old male admitted with intractable low back pain.  Started 2 weeks ago and has been gradually worsening according to patient.  Currently 10 out of 10 pain.  No radiation of pain.  No relieving factors.  Patient states he has history of back surgery in the past.  Denies any recent fall or trauma.  Denies any focal weakness or numbness.  MRI of the lumbar spine done in ER showed moderate left-sided L1-L2 disc protrusion that impinges on the left L1 nerve root which is new and left-sided foraminal disc protrusion new at L3-L4 abutting left L3 nerve root.  Annular tear of L4-5 and annual disc bulge and being admitted for uncontrolled intractable low back pain.  Denies any fever or chills.  Denies any nausea vomiting.  Denies any constipation or diarrhea.  Denies any bowel or bladder incontinence.       Brief Synopsis: Pt was admitted and given pain control. Pt was seen by pain service and had DIPIKA and tolerated well.     Lace+ Score: 33  59-90 High Risk  29-58 Medium Risk  0-28   Low Risk       TCM Follow-Up Recommendation:  LACE 29-58: Moderate Risk of readmission after discharge from the hospital.      Procedures during hospitalization:   DIPIKA    Incidental or significant findings and recommendations (brief descriptions):  none    Lab/Test results pending at Discharge:   none    Consultants:  Pain Service, NeurSx    Discharge Medication List:     Discharge Medications        START taking these medications        Instructions Prescription details    HYDROcodone-acetaminophen 5-325 MG Tabs  Commonly known as: Norco  Notes to patient: This is your narcotic pain medication. Make sure to take over-the-counter stool softeners/laxatives while taking narcotics to help prevent constipation.       Take 1-2 tablets by mouth every 6 (six) hours as needed for Pain (do not take this medication prior to drinking alcohol or driving as this medication can impair your senses.). Do not drink alcohol or drive while taking this medication as it can impair your senses.   Stop taking on: April 19, 2025  Quantity: 12 tablet  Refills: 0     methocarbamol 500 MG Tabs  Commonly known as: Robaxin  Notes to patient: This is your muscle relaxer. Recommend that you take this medication around-the-clock for the next 24-48 hours, then take four times daily as needed for muscle pain/spasms      Take 1 tablet (500 mg total) by mouth 4 (four) times daily. As needed for muscle spasm   Quantity: 15 tablet  Refills: 0     methylPREDNISolone 4 MG Tbpk  Commonly known as: Medrol  Notes to patient: This is your steroid medication, to reduce inflammation around the nerves. Make sure to take until all pills are gone.      Dosepack: take as directed   Quantity: 1 each  Refills: 0            CONTINUE taking these medications        Instructions Prescription details   buPROPion  MG Tb24  Commonly known as: Wellbutrin XL      Take 1 tablet (150 mg total) by mouth daily.   Quantity: 90 tablet  Refills: 0     buPROPion  MG Tb24  Commonly known as: Wellbutrin XL      Take 1 tablet (300 mg total) by mouth daily.   Quantity: 90 tablet  Refills: 0     cloNIDine 0.1 MG Tabs  Commonly known as: Catapres      Take 1 tablet (0.1 mg total) by mouth nightly as needed. TAKE AT BEDTIME   Refills: 0     Dyanavel XR 10 MG Libby  Generic drug: Amphetamine ER       Refills: 0     Dyanavel XR 20 MG Libby  Generic drug: Amphetamine ER      Take 1 tablet by mouth every morning.   Refills: 0     Eletriptan  Hydrobromide 40 MG Tabs  Commonly known as: RELPAX      TAKE 1 TABLET AT ONSET. REPEAT ONCE AFTER 2 HOURS IF NEEDED, MAX 2 TABLETS PER 24 HOURS   Quantity: 18 tablet  Refills: 0     rosuvastatin 10 MG Tabs  Commonly known as: Crestor      TAKE 1 TABLET NIGHTLY   Quantity: 90 tablet  Refills: 0     Trintellix 10 MG Tabs  Generic drug: vortioxetine      Take 2 tablets (20 mg total) by mouth in the morning.   Refills: 0            STOP taking these medications      traZODone 50 MG Tabs  Commonly known as: Desyrel                  Where to Get Your Medications        These medications were sent to [facility]/pharmacy #[id number] - [place], [place] - [address] [phone], [phone] [address] [facility] [address]      Phone: [phone]   HYDROcodone-acetaminophen 5-325 MG Tabs  methocarbamol 500 MG Tabs  methylPREDNISolone 4 MG Tbpk         [place] reviewed[place]    Follow-up appointment:   Hemal Martines MD  [address] [place] [id number] [phone]    Schedule an appointment as soon as possible for a visit in 1 week(s)  Follow-up with your PMD for reevaluation in 24 to 48 hours.  Return to ER if symptoms worsen or change or if any other new concerns.    Elroy Oconnor MD  [address] [facility] [address]  [facility] [address] [phone]    Schedule an appointment as soon as possible for a visit  As needed    Diogenes George MD  [address] [address] [phone]    Follow up  As needed    Appointments for Next 30 Days 4/15/2025 - 5/15/2025      None            Vital signs:  Temp:  [97.6 °F (36.4 °C)-98.9 °F (37.2 °C)] 98 °F (36.7 °C)  Pulse:  [60-89] 73  Resp:  [16-18] 16  BP: (138-169)/(67-98) 154/88  SpO2:  [93 %-97 %] 96 %        -----------------------------------------------------------------------------------------------  PATIENT DISCHARGE INSTRUCTIONS: See electronic chart    Isaac Olmedo MD    Total time spent on discharge plannin  minutes     The 21st Century Cures Act makes medical notes like these available to patients in the interest of transparency. Please be advised this is a medical document. Medical documents are intended to carry relevant information, facts as evident, and the clinical opinion of the practitioner. The medical note is intended as peer to peer communication and may appear blunt or direct. It is written in medical language and may contain abbreviations or verbiage that are unfamiliar.      normal...
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
## 2025-04-15 NOTE — OCCUPATIONAL THERAPY NOTE
OCCUPATIONAL THERAPY EVALUATION - INPATIENT    Room Number: 371/371-A  Evaluation Date: 4/15/2025     Type of Evaluation: Initial  Presenting Problem: intractable back pain, s/p LESI 4/15/25    Physician Order: IP Consult to Occupational Therapy  Reason for Therapy:  ADL/IADL Dysfunction and Discharge Planning    OCCUPATIONAL THERAPY ASSESSMENT   Patient is a 54 year old male admitted on 4/12/2025 with Presenting Problem: intractable back pain, s/p LESI 4/15/25. Co-Morbidities : HLD, anxiety, depression, discectomy 2020  Patient is currently functioning near baseline with ADLs and functional transfers.  Prior to admission, patient's baseline is independent.  Patient met all OT goals at supervision to mod I level.  Patient reports no further questions/concerns at this time.     No further skilled OT needs at this time.    Recommendations for nursing staff:   Transfers: 1-person  Toileting location: Toilet    EVALUATION SESSION:  Patient at start of session: chair    FUNCTIONAL TRANSFER ASSESSMENT  Sit to Stand: Edge of Bed; Chair  Edge of Bed: Modified Independent  Chair: Modified Independent  Toilet Transfer: Modified Independent (simulated)    BED MOBILITY  Rolling: Modified Independent  Supine to Sit : Modified Independent (via logroll)  Sit to Supine (OT): Modified Independent (via logroll)    BALANCE ASSESSMENT     FUNCTIONAL ADL ASSESSMENT  UB Dressing Seated: Modified Independent  LB Dressing Seated: Modified Independent (without AE)  LB Dressing Standing: Modified Independent  Toileting Seated: Modified Independent  Toileting Standing: Modified Independent    ACTIVITY TOLERANCE: WFL                         O2 SATURATIONS       COGNITION  Overall Cognitive Status:  WFL - within functional limits    COGNITION ASSESSMENTS     Upper Extremity:   ROM: within functional limits   Strength: is within functional limits     EDUCATION PROVIDED  Patient Education : Role of Occupational Therapy; Functional Transfer  Techniques; Fall Prevention; Posture/Positioning; Proper Body Mechanics  Patient's Response to Education: Verbalized Understanding    Equipment used: RW  Demonstrates functional use    Therapist comments: Patient motivated and participatory. Educated on spine precautions for comfort and incorporation into ADLs and transfers, followed with good return demo. Patient performed all ADLs and functional transfers at Supervision to Mod I level. Patient aware of recommendation for initial supervision at discharge, including supervision for shower transfers and increased assist with IADLs; patient reports will have initial supervision/assist as needed from wife and grown children at discharge; reports plans to drive son back to college out of state later this week; encouraged checking with MD first as well as taking frequent breaks to get out of car. Patient reports no further questions or concerns regarding discharge home to ADLs.     Patient End of Session: Up in chair, Call light within reach, Needs met, RN aware of session/findings, All patient questions and concerns addressed, Hospital anti-slip socks    OCCUPATIONAL PROFILE    HOME SITUATION  Type of Home: House  Home Layout: Two level  Lives With: Spouse (kids)    Toilet and Equipment: Standard height toilet  Shower/Tub and Equipment: Walk-in shower  Other Equipment: None    Occupation/Status: training and development     Drives: Yes       Prior Level of Function: Patient reports independent in all I/ADL and functional mobility without device prior to admission.    SUBJECTIVE  \"I wouldn't have been able to do any of this yesterday!\"    PAIN ASSESSMENT  Ratin  Location: back, L thigh  Management Techniques: Activity promotion, Body mechanics, Repositioning    OBJECTIVE  Precautions: Spine (for comfort)  Fall Risk: Standard fall risk    WEIGHT BEARING RESTRICTION       AM-PAC ‘6-Clicks’ Inpatient Daily Activity Short Form  -   Putting on and taking off regular lower  body clothing?: None  -   Bathing (including washing, rinsing, drying)?: None  -   Toileting, which includes using toilet, bedpan or urinal? : None  -   Putting on and taking off regular upper body clothing?: None  -   Taking care of personal grooming such as brushing teeth?: None  -   Eating meals?: None    AM-PAC Score:  Score: 24  Approx Degree of Impairment: 0%  Standardized Score (AM-PAC Scale): 57.54    ADDITIONAL TESTS     NEUROLOGICAL FINDINGS      PLAN   Patient has been evaluated and presents with no skilled Occupational Therapy needs at this time.  Patient discharged from Occupational Therapy services.  Please re-order if a new functional limitation presents during this admission.    OT Device Recommendations: None    Patient Evaluation Complexity Level:   Occupational Profile/Medical History LOW - Brief history including review of medical or therapy records    Specific performance deficits impacting engagement in ADL/IADL LOW  1 - 3 performance deficits    Client Assessment/Performance Deficits LOW - No comorbidities nor modifications of tasks    Clinical Decision Making LOW - Analysis of occupational profile, problem-focused assessments, limited treatment options    Overall Complexity LOW     OT Session Time: 23 minutes  Self-Care Home Management: 8 minutes

## 2025-04-15 NOTE — PLAN OF CARE
Pt A&O. On room air. Tele and  monitoring maintained. Wears mouthgaurd when sleeping. Refusing scds. Tolerating diet with good appetite. Last BM 4/12/25. Voiding w/o difficulty. Pain managed with current medications. Participating in therapy as ordered. Up with standby assistance using walker and gait belt. Bandaids x2 to back C/D/I. Gel ice packs as needed for pain/swelling. Pt ready for dc home today,.    Pt's Rx electronically sent to his pharmacy. Pt's son at bedside to take pt home.

## 2025-04-15 NOTE — PROGRESS NOTES
[facility]   part of [facility]     Hospitalist Progress Note     Shin Liang Patient Status:  Observation    1970 MRN [id number]   Location [facility] 3SW-A Attending Isaac Olmedo MD   Hosp Day # 1 PCP Hemal Martines MD     Chief Complaint: back pain    Subjective:     Patient without acute events overnight. Had DIPIKA this morning and tolerated well. Hopes to go home later today.     Objective:    Review of Systems:   A comprehensive review of systems was completed; pertinent positive and negatives stated in subjective.    Vital signs:  Temp:  [97.6 °F (36.4 °C)-98.9 °F (37.2 °C)] 97.7 °F (36.5 °C)  Pulse:  [60-90] 65  Resp:  [16-18] 18  BP: (125-169)/(67-98) 169/98  SpO2:  [91 %-97 %] 97 %    Physical Exam:    General: No acute distress  Respiratory: No wheezes, no rhonchi  Cardiovascular: S1, S2, regular rate and rhythm  Abdomen: Soft, Non-tender, non-distended, positive bowel sounds  Neuro: No new focal deficits.   Extremities: No edema      Diagnostic Data:    Labs:  Recent Labs   Lab 25  0452 25  0444   WBC 12.6* 14.1*   HGB 16.0 16.2   MCV 87.1 90.1   .0* 503.0*       Recent Labs   Lab 25  0452 25  0444   * 152*   BUN 14 16   CREATSERUM 0.95 1.03   CA 9.6 9.5   ALB 4.8  --     139   K 4.1 4.3    105   CO2 19.0* 23.0   ALKPHO 61  --    AST 22  --    ALT 19  --    BILT 0.8  --    TP 7.4  --        Estimated Glomerular Filtration Rate: 87 mL/min/1.73m2 (result from lab).    No results for input(s): \"TROP\", \"TROPHS\", \"CK\" in the last 168 hours.    No results for input(s): \"PTP\", \"INR\" in the last 168 hours.               Microbiology    No results found for this visit on 25.      Imaging: Reviewed in Epic.    Medications: Scheduled Medications[1]    Assessment & Plan:      #Intractable low back pain due to lumbar disc prolapse L1-L2 with left L1 nerve root impingement, left-sided foraminal disc protrusion L3-4 which  is new, annular tear L4-L5  Pain management with oral and IV pain medications as needed, Lidoderm patch for local application  Ice for local application  S/p DIPIKA 4/15/2025  PT OT eval  #Hyperlipidemia  Will plan to continue home medication  #Anxiety, depression  Will plan to continue home medication      Isaac Olmedo MD    Supplementary Documentation:     Quality:  DVT Mechanical Prophylaxis:   SCDs,    DVT Pharmacologic Prophylaxis   Medication    [Held by provider] heparin (Porcine) 5000 UNIT/ML injection 5,000 Units                Code Status: Full Code  Moody: External urinary catheter in place  Moody Duration (in days):   Central line:    NICOLA: 4/15/2025    Discharge is dependent on: progress  At this point Mr. Liang is expected to be discharge to: home    The 21st Century Cures Act makes medical notes like these available to patients in the interest of transparency. Please be advised this is a medical document. Medical documents are intended to carry relevant information, facts as evident, and the clinical opinion of the practitioner. The medical note is intended as peer to peer communication and may appear blunt or direct. It is written in medical language and may contain abbreviations or verbiage that are unfamiliar.                         [1]    buPROPion ER  150 mg Oral Daily    buPROPion ER  300 mg Oral Daily    [Held by provider] Dyanavel XR  20 mg Oral QAM    rosuvastatin  10 mg Oral Nightly    vortioxetine  20 mg Oral Daily    [Held by provider] heparin  5,000 Units Subcutaneous 2 times per day    lidocaine-menthol  1 patch Transdermal Daily    dexamethasone  4 mg Intravenous Q6H

## 2025-04-15 NOTE — PROGRESS NOTES
0745: Pt sent to Endo. All belongings at bedside.    [facility]

## 2025-04-15 NOTE — PLAN OF CARE
Patient A&Ox4. VSS on RA. Tele; NSR, ST at times. Voiding without difficulty via external catheter. Mild pain reported to back, declines need for pain medication at this time. Plan for DIPIKA today, NPO for procedure. PT/OT to see. Safety precautions in place, call light within reach. Patient updated on plan of care.

## 2025-04-15 NOTE — OPERATIVE REPORT
[facility]  Operative Report  4/15/2025     Shin Liang Patient Status:  Inpatient    1970 MRN [id number]   Location [facility] Attending Isaac Olmedo MD   Hosp Day # 1 PCP Hemal Martines MD     Indication: Shin is a 54 year old male with lumbar radiculitis    Preoperative Diagnosis:  Lumbar radiculitis [M54.16]    Postoperative Diagnosis: Same as above.    Procedure performed: LEFT LUMBAR 1, LUMBAR 3 TRANSFORAMINAL EPIDURAL STEROID INJECTION MULTIPLE LEVEL WITH LOCAL     Anesthesia: Local      EBL: Less than 1 ml.    Procedure Description:  After reviewing the patient's history and performing a focused physical examination, the diagnosis was confirmed and contraindications such as infection and coagulopathy were ruled out.  Following review of potential side effects and complications, including but not necessarily limited to infection, allergic reaction, local tissue breakdown, nerve injury, and paresis, the patient indicated they understood and agreed to proceed.  After obtaining the informed consent, the patient was brought to the procedure room and monitored.          In the prone position, following sterile prep and drape of the lumbar region,  the  L1 neural foramen was identified under fluoroscopy.  The skin and subcutaneous tissue was anesthetized via 25-gauge 1.5\" needle with approximately 2 cc of 1% lidocaine.  A 22-gauge 5\" Quincke spinal needle was introduced toward the inferior aspect of the junction between the transverse process and pedicle of the  L1 level atraumatically under fluoroscopic guidance. The needle was advanced into the anterior epidural space at this level. The needle position was confirmed under AP and lateral fluoroscopic view.  Following negative aspiration for CSF and blood, approximately 1 cc of Omnipaque 240 was injected.  An excellent contrast spread along the epidural space and the nerve root was obtained.  At this point,  1cc of normal saline with 5 mg of dexamethasone was injected without complication.  The needle was withdrawn with stylet in situ after being flushed with 1 cc PF lidocaine.     The  L3 neural foramen was also identified under fluoroscopy.  The skin and subcutaneous tissue was anesthetized via 25-gauge 1.5\" needle with approximately 2 cc of 1% lidocaine.  A 22-gauge 5\" Quincke spinal needle was introduced toward the inferior aspect of the junction between the transverse process and pedicle of the L3 level atraumatically under fluoroscopic guidance. The needle was advanced into the anterior epidural space at this level. The needle position was confirmed under AP and lateral fluoroscopic view.  Following negative aspiration for CSF and blood, approximately 1 cc of Omnipaque 240 was injected.  An excellent contrast spread along the epidural space and the nerve root was obtained.  At this point, 1cc of normal saline with 5 mg of dexamethasone was injected without complication.  The needle was withdrawn with stylet in situ after being flushed with 1 cc PF lidocaine..  The patient tolerated procedure very well.  The patient was observed until discharge criteria met.  Discharge instructions were given and patient was released to a responsible adult.       Complications: None.    Follow up:  The patient was followed in the pain clinic as needed basis.        Elroy Oconnor MD

## 2025-04-16 ENCOUNTER — PATIENT OUTREACH (OUTPATIENT)
Dept: CASE MANAGEMENT | Age: 55
End: 2025-04-16

## 2025-04-16 ENCOUNTER — TELEPHONE (OUTPATIENT)
Dept: PAIN CLINIC | Facility: CLINIC | Age: 55
End: 2025-04-16

## 2025-04-16 NOTE — TELEPHONE ENCOUNTER
Courtesy called placed to patient for post procedure follow up. Patient stated no questions, he seems to be better. Pt is able to get up and stated he wasn't able to before the injection, so the pain has gotten better. Educated patient that it takes 3-5 days for the steroid to be effective and to allow adequate time for medication to work. Pt verbalized understanding to call with any questions or concerns.      Procedure:   LEFT LUMBAR 1, LUMBAR 3 TRANSFORAMINAL EPIDURAL STEROID INJECTION MULTIPLE LEVEL WITH LOCAL     Date: 4/15/25  Follow up Visit Scheduled: 5/5/25 @3 w/ Dr. Oconnor

## 2025-04-16 NOTE — PROGRESS NOTES
Transitional Care Management   Discharge Date: 4/15/25  Contact Date: 2025    Assessment:  TCM Initial Assessment    General:  Assessment completed with: Patient  Patient Subjective: Pt states he is up ans moving around doing better since discharge.  Chief Complaint: Acute back pain  Verify patient name and  with patient/ caregiver: Yes    Hospital Stay/Discharge:  Prior to leaving the hospital were your Discharge Instructions reviewed with you?: Yes  Did you receive a copy of your written Discharge Instructions?: Yes  Do you feel better or worse since you left the hospital or emergency department?: Better    Follow - Up Appointment:  Do you have a follow-up appointment?: No  Are there any barriers to getting to your follow-up appointment?: No    Home Health/DME:  Prior to leaving the hospital was Home Health (HH) arranged for you?: No     Prior to leaving the hospital or emergency department was Durable Medical Equipment (DME), medical supplies, or infusions arranged for you?: No  Are DME/medical supply/infusions needs identified by staff during this assessment?: No     Medications/Diet:       Were you given a different diet per your Discharge Instructions?: No     Questions/Concerns:  Do you have any questions or concerns that have not been discussed?: No         Follow-up Appointments:      Transitional Care Clinic  Was TCC Ordered: No  Was TCC Scheduled: No, Explain scheduled with PCP.    Primary Care Provider (If no TCC appointment)  Does patient already have a PCP appointment scheduled? No  Care Manager Scheduled PCP office TCM appointment with patient    Specialist  Does the patient have any other follow-up appointment(s) that need to be scheduled? No   -If yes: Care Manager reviewed upcoming specialist appointments with patient: No   -Does the patient need assistance scheduling appointment(s): No      Book By Date: 25

## 2025-04-22 ENCOUNTER — LABORATORY ENCOUNTER (OUTPATIENT)
Dept: LAB | Age: 55
End: 2025-04-22
Attending: NEUROLOGICAL SURGERY
Payer: COMMERCIAL

## 2025-04-22 ENCOUNTER — LAB ENCOUNTER (OUTPATIENT)
Dept: LAB | Facility: HOSPITAL | Age: 55
End: 2025-04-22
Attending: NEUROLOGICAL SURGERY
Payer: COMMERCIAL

## 2025-04-22 ENCOUNTER — TELEPHONE (OUTPATIENT)
Dept: FAMILY MEDICINE CLINIC | Facility: CLINIC | Age: 55
End: 2025-04-22

## 2025-04-22 ENCOUNTER — LABORATORY ENCOUNTER (OUTPATIENT)
Dept: LAB | Age: 55
DRG: 520 | End: 2025-04-22
Attending: NEUROLOGICAL SURGERY
Payer: COMMERCIAL

## 2025-04-22 ENCOUNTER — TELEPHONE (OUTPATIENT)
Dept: SURGERY | Facility: CLINIC | Age: 55
End: 2025-04-22

## 2025-04-22 ENCOUNTER — OFFICE VISIT (OUTPATIENT)
Dept: FAMILY MEDICINE CLINIC | Facility: CLINIC | Age: 55
End: 2025-04-22
Payer: COMMERCIAL

## 2025-04-22 ENCOUNTER — LAB ENCOUNTER (OUTPATIENT)
Dept: LAB | Facility: HOSPITAL | Age: 55
DRG: 520 | End: 2025-04-22
Attending: NEUROLOGICAL SURGERY
Payer: COMMERCIAL

## 2025-04-22 ENCOUNTER — OFFICE VISIT (OUTPATIENT)
Dept: SURGERY | Facility: CLINIC | Age: 55
End: 2025-04-22
Payer: COMMERCIAL

## 2025-04-22 ENCOUNTER — EKG ENCOUNTER (OUTPATIENT)
Dept: LAB | Age: 55
End: 2025-04-22
Attending: NEUROLOGICAL SURGERY

## 2025-04-22 VITALS — DIASTOLIC BLOOD PRESSURE: 76 MMHG | OXYGEN SATURATION: 96 % | SYSTOLIC BLOOD PRESSURE: 114 MMHG | HEART RATE: 95 BPM

## 2025-04-22 VITALS
DIASTOLIC BLOOD PRESSURE: 82 MMHG | WEIGHT: 160 LBS | RESPIRATION RATE: 16 BRPM | HEIGHT: 70.5 IN | HEART RATE: 57 BPM | BODY MASS INDEX: 22.65 KG/M2 | SYSTOLIC BLOOD PRESSURE: 154 MMHG | TEMPERATURE: 98 F

## 2025-04-22 DIAGNOSIS — Z00.00 HEALTH MAINTENANCE EXAMINATION: ICD-10-CM

## 2025-04-22 DIAGNOSIS — M54.16 LUMBAR RADICULOPATHY: ICD-10-CM

## 2025-04-22 DIAGNOSIS — E78.00 HYPERCHOLESTEREMIA: ICD-10-CM

## 2025-04-22 DIAGNOSIS — Z01.818 PRE-OP TESTING: ICD-10-CM

## 2025-04-22 DIAGNOSIS — Z12.5 SCREENING FOR MALIGNANT NEOPLASM OF PROSTATE: ICD-10-CM

## 2025-04-22 DIAGNOSIS — Z01.818 PREOP GENERAL PHYSICAL EXAM: ICD-10-CM

## 2025-04-22 DIAGNOSIS — M54.16 LUMBAR RADICULOPATHY: Primary | ICD-10-CM

## 2025-04-22 DIAGNOSIS — M51.16 LUMBAR DISC HERNIATION WITH RADICULOPATHY: ICD-10-CM

## 2025-04-22 DIAGNOSIS — R73.9 HYPERGLYCEMIA: ICD-10-CM

## 2025-04-22 LAB
ALBUMIN SERPL-MCNC: 4.6 G/DL (ref 3.2–4.8)
ALBUMIN/GLOB SERPL: 1.9 {RATIO} (ref 1–2)
ALP LIVER SERPL-CCNC: 56 U/L (ref 45–117)
ALT SERPL-CCNC: 69 U/L (ref 10–49)
ANION GAP SERPL CALC-SCNC: 8 MMOL/L (ref 0–18)
ANTIBODY SCREEN: NEGATIVE
APTT PPP: 24.6 SECONDS (ref 23–36)
AST SERPL-CCNC: 26 U/L (ref ?–34)
BASOPHILS # BLD AUTO: 0.07 X10(3) UL (ref 0–0.2)
BASOPHILS NFR BLD AUTO: 0.6 %
BILIRUB SERPL-MCNC: 1.1 MG/DL (ref 0.3–1.2)
BUN BLD-MCNC: 19 MG/DL (ref 9–23)
CALCIUM BLD-MCNC: 9.4 MG/DL (ref 8.7–10.6)
CHLORIDE SERPL-SCNC: 101 MMOL/L (ref 98–112)
CHOLEST SERPL-MCNC: 253 MG/DL (ref ?–200)
CO2 SERPL-SCNC: 28 MMOL/L (ref 21–32)
COMPLEXED PSA SERPL-MCNC: 1.15 NG/ML (ref ?–4)
CREAT BLD-MCNC: 0.95 MG/DL (ref 0.7–1.3)
EGFRCR SERPLBLD CKD-EPI 2021: 96 ML/MIN/1.73M2 (ref 60–?)
EOSINOPHIL # BLD AUTO: 0.16 X10(3) UL (ref 0–0.7)
EOSINOPHIL NFR BLD AUTO: 1.5 %
ERYTHROCYTE [DISTWIDTH] IN BLOOD BY AUTOMATED COUNT: 13.8 %
EST. AVERAGE GLUCOSE BLD GHB EST-MCNC: 117 MG/DL (ref 68–126)
FASTING PATIENT LIPID ANSWER: NO
FASTING STATUS PATIENT QL REPORTED: NO
GLOBULIN PLAS-MCNC: 2.4 G/DL (ref 2–3.5)
GLUCOSE BLD-MCNC: 93 MG/DL (ref 70–99)
HBA1C MFR BLD: 5.7 % (ref ?–5.7)
HCT VFR BLD AUTO: 49.5 % (ref 39–53)
HDLC SERPL-MCNC: 85 MG/DL (ref 40–59)
HGB BLD-MCNC: 16.9 G/DL (ref 13–17.5)
IMM GRANULOCYTES # BLD AUTO: 0.15 X10(3) UL (ref 0–1)
IMM GRANULOCYTES NFR BLD: 1.4 %
INR BLD: 0.97 (ref 0.8–1.2)
LDLC SERPL CALC-MCNC: 144 MG/DL (ref ?–100)
LYMPHOCYTES # BLD AUTO: 3.27 X10(3) UL (ref 1–4)
LYMPHOCYTES NFR BLD AUTO: 29.9 %
MCH RBC QN AUTO: 30.6 PG (ref 26–34)
MCHC RBC AUTO-ENTMCNC: 34.1 G/DL (ref 31–37)
MCV RBC AUTO: 89.5 FL (ref 80–100)
MONOCYTES # BLD AUTO: 0.86 X10(3) UL (ref 0.1–1)
MONOCYTES NFR BLD AUTO: 7.9 %
NEUTROPHILS # BLD AUTO: 6.41 X10 (3) UL (ref 1.5–7.7)
NEUTROPHILS # BLD AUTO: 6.41 X10(3) UL (ref 1.5–7.7)
NEUTROPHILS NFR BLD AUTO: 58.7 %
NONHDLC SERPL-MCNC: 168 MG/DL (ref ?–130)
OSMOLALITY SERPL CALC.SUM OF ELEC: 286 MOSM/KG (ref 275–295)
P2Y12 REACTION UNITS: 146 PRU
PLATELET # BLD AUTO: 528 10(3)UL (ref 150–450)
POTASSIUM SERPL-SCNC: 3.7 MMOL/L (ref 3.5–5.1)
PROT SERPL-MCNC: 7 G/DL (ref 5.7–8.2)
PROTHROMBIN TIME: 13 SECONDS (ref 11.6–14.8)
RBC # BLD AUTO: 5.53 X10(6)UL (ref 4.3–5.7)
RH BLOOD TYPE: NEGATIVE
SODIUM SERPL-SCNC: 137 MMOL/L (ref 136–145)
TRIGL SERPL-MCNC: 140 MG/DL (ref 30–149)
TSI SER-ACNC: 0.75 UIU/ML (ref 0.55–4.78)
VLDLC SERPL CALC-MCNC: 26 MG/DL (ref 0–30)
WBC # BLD AUTO: 10.9 X10(3) UL (ref 4–11)

## 2025-04-22 PROCEDURE — 84443 ASSAY THYROID STIM HORMONE: CPT

## 2025-04-22 PROCEDURE — 85730 THROMBOPLASTIN TIME PARTIAL: CPT

## 2025-04-22 PROCEDURE — 86901 BLOOD TYPING SEROLOGIC RH(D): CPT

## 2025-04-22 PROCEDURE — 80053 COMPREHEN METABOLIC PANEL: CPT

## 2025-04-22 PROCEDURE — 80061 LIPID PANEL: CPT

## 2025-04-22 PROCEDURE — 86900 BLOOD TYPING SEROLOGIC ABO: CPT

## 2025-04-22 PROCEDURE — 3079F DIAST BP 80-89 MM HG: CPT | Performed by: FAMILY MEDICINE

## 2025-04-22 PROCEDURE — 85610 PROTHROMBIN TIME: CPT

## 2025-04-22 PROCEDURE — 87081 CULTURE SCREEN ONLY: CPT

## 2025-04-22 PROCEDURE — 93010 ELECTROCARDIOGRAM REPORT: CPT | Performed by: INTERNAL MEDICINE

## 2025-04-22 PROCEDURE — 99214 OFFICE O/P EST MOD 30 MIN: CPT | Performed by: FAMILY MEDICINE

## 2025-04-22 PROCEDURE — 83036 HEMOGLOBIN GLYCOSYLATED A1C: CPT

## 2025-04-22 PROCEDURE — 85576 BLOOD PLATELET AGGREGATION: CPT

## 2025-04-22 PROCEDURE — 86850 RBC ANTIBODY SCREEN: CPT

## 2025-04-22 PROCEDURE — 3008F BODY MASS INDEX DOCD: CPT | Performed by: FAMILY MEDICINE

## 2025-04-22 PROCEDURE — 3077F SYST BP >= 140 MM HG: CPT | Performed by: FAMILY MEDICINE

## 2025-04-22 PROCEDURE — G2211 COMPLEX E/M VISIT ADD ON: HCPCS | Performed by: FAMILY MEDICINE

## 2025-04-22 PROCEDURE — 85025 COMPLETE CBC W/AUTO DIFF WBC: CPT

## 2025-04-22 PROCEDURE — 36415 COLL VENOUS BLD VENIPUNCTURE: CPT

## 2025-04-22 PROCEDURE — 93005 ELECTROCARDIOGRAM TRACING: CPT

## 2025-04-22 RX ORDER — PROPRANOLOL HYDROCHLORIDE 10 MG/1
10 TABLET ORAL DAILY
Status: ON HOLD | COMMUNITY
Start: 2025-02-10

## 2025-04-22 RX ORDER — TRAMADOL HYDROCHLORIDE 50 MG/1
50 TABLET ORAL EVERY 6 HOURS PRN
Qty: 30 TABLET | Refills: 0 | Status: ON HOLD | OUTPATIENT
Start: 2025-04-22

## 2025-04-22 RX ORDER — VORTIOXETINE 20 MG/1
20 TABLET, FILM COATED ORAL DAILY
Status: ON HOLD | COMMUNITY
Start: 2025-04-08

## 2025-04-22 NOTE — PROGRESS NOTES
Neurosurgery Clinic Visit  2025    Shin Liang PCP:  Hemal Martines MD    1970 MRN [id number]     HISTORY OF PRESENT ILLNESS:  Shin Liang is a(n) 54 year old male who presents today for hospital follow-up.  He was seen during a recent admission for intractable left leg pain.    He reports that the epidural steroid injection did not offer any benefit at all.  He manage drive to [place] with his son, to return him to college.  He then flew home.    His activity level has been severely limited.  At this point, he has trouble walking, and even doing the simplest activities of daily living around the house.    He also notes some new weakness in the left hip flexor.  He reports that there is a component of pain radiating into his left groin, as well as a component that radiates down the anterior aspect of the thigh.      PHYSICAL EXAMINATION:  Vital Signs:  /76   Pulse 95   SpO2 96%   On examination, strength is 5/5 throughout with the exception of left iliopsoas at 4/5.      REVIEW OF STUDIES:    MRI scan was performed .  Images personally reviewed once again.  This demonstrates left L1-2 and left L3-4 far lateral disc herniations with nerve root compression.      ASSESSMENT and PLAN:  1.  Left L1-2 and left L3-4 far lateral disc herniations with intractable radiculopathies.  The patient's symptoms are consistent with radicular pain coming from both of these levels.    We discussed available treatment options.  At this point, his pain is worsening in spite of optimal nonsurgical treatment.  He also notes new weakness in the left hip flexors.  I think urgent surgical intervention is reasonably indicated.    I discussed the possibility of microlaminectomy and discectomy with the patient.  We discussed the risks and benefits of the procedure as well.  Risks of the operation include, but are not limited to, anesthetic complications, pain, death, paralysis, need for more  surgery, failure to improve symptomatically, CSF leak, weakness, infection, recurrent disc herniation, instability, etc. the patient voices understanding, and wishes to proceed.    Left L1-2 and left L3-4 minimally invasive far lateral microdiscectomy  PCP clearance        Time spent on counseling/coordination of care:  30 Minutes    Total time spent with patient:  15 Minutes        4/22/2025  9:44 AM     This report was dictated using voice recognition technology.  Errors in syntax or recognition may occur, and should not be construed to change the meaning of a sentence.
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
## 2025-04-22 NOTE — PROGRESS NOTES
4/12/25- hospital f/u Lumbar Disc Prolapse, intractable back pain    Pt states pain today 10/10 with taking a Farrell this morning  Pt states pain in left groin area radiating to thigh     IMAGING  4/12/25  XR LUMBAR SPINE  4/13/25  MRI SPINE LUMBAR    TREATMENTS:  4/15/25  LEFT LUMBAR 1, LUMBAR 3 TRANSFORAMINAL EPIDURAL STEROID w/ Dr Oconnor with some relief    Sx HISTORY:  9/172020: Left LUMBAR MICRODISCECTOMY 1 LEVEL w/ Dr Reginald Carrillo    12/28/2020: Right LUMBAR LAMINECTOMY 1 LEVEL w/ Dr Luis Pruett               The following individual(s) verbally consented to be recorded using ambient AI listening technology and understand that they can each withdraw their consent to this listening technology at any point by asking the clinician to turn off or pause the recording:    Patient name: Shin Mendel Liang

## 2025-04-22 NOTE — TELEPHONE ENCOUNTER
Patient is scheduled forLeft L1-L2 microdiscectomy and L3-L4 microdiscectomy on 4.28.25 with  at [facility].     NA pre-op apt scheduled (if sx is more than 30 days from last apt)  NOpre-op apt scheduled with RN spine navigator  Y Surgical instructions reviewed by nursing staff with patient  Y  form completed  Y Surgery order signed   Y Placed sx on surgery sheet  Y Placed on outlook calendar  Y [employer] message sent to patient with sx instructions  Y Faxed pre-op clearance request to PCP BALDOMERO YUNG Faxed letter to prescribing provider requesting anticoagulants be held for surgery  NA E-mail sent to [employer]  Y Post-op appointments made  Y PA NEEDED. Routed to PA team to initiate.  Y Post-Op outreach pt reminder placed.   Y Entire Neurosurgery Checklist Completed    Clearances: Y  PA: JANELLE  CPT Codes: 63367, 82506

## 2025-04-22 NOTE — TELEPHONE ENCOUNTER
You are scheduled for Left L1-L2 microdiscectomy and L3-L4 microdiscectomy on 4.28.25 with  at [facility].     PCP clearance is needed.  We have faxed a request for pre-op clearance to your PCP Dr Martines. Please contact their office for appointment.      You will need  pre operative labs which will include MRSA/MSSA testing.  You will need to contact the Pre-admission department at [phone] to schedule an appointment for your labs.     The Pre-Admission nurse will review your health history and give you information from the hospital. The nurse will also get you scheduled for all your pre-op testing required for your surgery.     Do not take any blood thinning medications such as over the counter non-steroidal antiinflammatories (advil, aleve, ibuprofen etc.), herbal supplements (garlic,tumeric etc.), vitamin E, fish oil or krill oil for at least 7 days prior to surgery. You may only take Tylenol, Extra Strength Tylenol, Arthritis Tylenol, or prescription Norco or Tramadol for pain if something is needed.    You should have nothing to eat or drink after 11:00pm the night prior to surgery except for the following:    Do drink 12 ounces of regular Gatorade (NOT RED) 12 hours and 4 hours prior to your scheduled surgery time, Do not drink any other liquids (including water) before your surgery. Do not chew gum or eat candies before surgery.  Take 1000 mg of Tylenol (Acetaminophen) 4 hours before your scheduled surgery time, take this with your scheduled Gatorade.    In order to prevent infection, you will need to purchase Hibiclens soap and use it after your regular body soap for 5 days prior to your procedure.  The last shower should be the night before surgery.  This soap can be found at any pharmacy in the first aid section. See detailed instructions below.      Our office will get prior authorization for surgery through your insurance.     Surgery is usually scheduled as a 23 hour admission.  This  is an estimate and varies from person to person.  Ultimately, the surgeon will determine when you are ready to be discharged.    The hospital will contact you 1-2 days before surgery with your arrival time.     If you were on blood thinners (such as Coumadin, Pradaxa, Xarelto, etc) prior to surgery that we had you stop for surgery, please make sure you get instructions about when to resume the medication before you are discharged from the hospital.    Per Dr. George's surgery protocol your appointments are as follows:    3 week post-op appointment scheduled on 5.19.25 at 10;45 am with GIULIA Lee    6 week post-op appointment scheduled on 6.10.25 at 3:00 pm with Dr George    3 month post surgery appointment scheduled on 7.22.25 at 2:45 pm with GIULIA Lee          Restrictions for after surgery:  Lifting restriction of 10 pounds or less.  No bending or twisting.  No prolonged sitting or standing.  Driving is restricted for the first 1-2 weeks (this will vary from surgeon to surgeon.)  Fusion patients may require bracing such as TLSO or LSO brace. Braces are custom made to fit the patient.  Patient's are to keep their incisions covered for the first 3 days post surgery. After that they may leave the incision open to air. It is better for the healing process if the incision is left open to air.   May shower after the third day.  Do not scrub the incision and avoid any lotions or creams to the incision.    Please make sure to arrive at least 15 minutes prior to your scheduled appointment in order to get checked in and roomed in a timely manner.  Depending on provider availability, late patients may be required to reschedule.  REFILLS:  After surgery, please remember that we do have a 48 hour refill policy that does not include weekends, please make sure to request your medications in a timely manner so that you do not go days without medication.  *Refills should be requested through your pharmacy or through the  refill request in [employer] (log in, go to medications, then select refill request).  [facility] MESSAGING:  Please remember that our office is closed during the weekend and no one is available for [employer] messages. If you have an urgent or emergent matter please go to a walk-in center or the emergency room. Also please remember your [employer] messages are part of your legal medical record and should not be utilized as a personal email with our providers as it is visible to all [facility] employees. Also, Since [employer] messages are not for emergent matters it may be several days before there is a response to your message.  FMLA/PAPERWORK COMPLETED BY OUR MEDICAL FORMS DEPARTMENT:  If you require FMLA paperwork for your surgery, please make sure to either Drop it off or have it faxed to our office at [phone]. Make sure it has your NAME, , and has your signature. You will need to have a Release of Information on file. To facilitate this process we ask that you requested it at the  on your way out and sign it. Without a signed JOSE or signature on the form we will not be able to fax it and this will cause a delay with your forms. Fees charged for forms are $25 for initial submission and $15 for recertifications. If you have questions on the status of your forms please call the forms department at [phone].  **We do have a 3 week policy for all forms and paperwork, please make sure to allow plenty of time for completion. Same day paperwork will not be completed. **    Spine Navigator  You will have a 30 minute telehealth visit with our spine navigator approximately 2 weeks before your surgery. This visit is NOT optional. This appointment will get booked when you schedule your spinal surgery.  When speaking with Pre-admissions you will be told about a spine class as it relates to your surgery, this is an OPTIONAL class. The same or similar information will be discussed with you during your telehealth  appointment with the spine navigator (Spine Navigator appointment is not optional). However, if you would like to have this information repeated to you or if you would feel more comfortable with additional information, you can elect to schedule this class during your pre-admissions phone call.  The Pre-op Spine Surgery Class is held at [facility] most Wednesdays from 3:30-4:30 pm. Call Pre-admission Testing (PAT) to register at:  [phone]. Please park in the [facility] Parking garage, check in at registration and meet in the [facility] lobby for your escort to class on the Ortho Spine unit. If unable to attend, but would like additional information, the class is available online at [email or web address].     Regarding current visitor information:    Please visit the following website for the detailed and up-to-date visitor screening and restriction policy at [email or web address] [email or web address].    Hibiclens Bathing  Hibiclens is a body soap that is used before surgery protect you from getting an infection after surgery   Hibiclens comes in a large blue bottle and can be found in most pharmacies in the First Aid supplies  Shower with this daily for FIVE consecutive days before surgery, using the entire bottle over the five days.  The last shower should be the night before surgery.   Steps to bathing with Hibiclens  Do not use Hibiclens on your hair, face or private areas  Wash your hair and face as normal with your usual cleansers  Rinse well  Using a clean wet washcloth apply enough Hibiclens to cover your body. Wash from the neck down avoiding the genital areas and concentrating on the surgical area  Rinse well  Dry yourself with a clean, dry towel  Do not use any powders, creams, lotions or sprays on your body as these attract bacteria  Deodorant and facial creams are acceptable.   (Laundering/cleaning: Chlorhexidine gluconate skin cleansers will cause stains if used with  chlorine releasing products. Rinse completely and use only non-chlorine detergents.)    For Office Use Only:    Medical Clearances Needed: pcp  PA: IMANI GROSSMAN PPO  CPT Codes: 65581

## 2025-04-22 NOTE — PATIENT INSTRUCTIONS
Refill policies:    Allow 2-3 business days for refills; controlled substances may take longer.  Contact your pharmacy at least 5 days prior to running out of medication and have them send an electronic request or submit request through the “request refill” option in your [employer] account.  Refills are not addressed on weekends; covering physicians do not authorize routine medications on weekends.  No narcotics or controlled substances are refilled after noon on Fridays or by on call physicians.  By law, narcotics must be electronically prescribed.  A 30 day supply with no refills is the maximum allowed.  If your prescription is due for a refill, you may be due for a follow up appointment.  To best provide you care, patients receiving routine medications need to be seen at least once a year.  Patients receiving narcotic/controlled substance medications need to be seen at least once every 3 months.  In the event that your preferred pharmacy does not have the requested medication in stock (e.g. Backordered), it is your responsibility to find another pharmacy that has the requested medication available.  We will gladly send a new prescription to that pharmacy at your request.    Scheduling Tests:    If your physician has ordered radiology tests such as MRI or CT scans, please contact Central Scheduling at [phone] right away to schedule the test.  Once scheduled, the [facility] Centralized Referral Team will work with your insurance carrier to obtain pre-certification or prior authorization.  Depending on your insurance carrier, approval may take 3-10 days.  It is highly recommended patients assure they have received an authorization before having a test performed.  If test is done without insurance authorization, patient may be responsible for the entire amount billed.      Precertification and Prior Authorizations:  If your physician has recommended that you have a procedure or additional testing performed the [facility]  Centralized Referral Team will contact your insurance carrier to obtain pre-certification or prior authorization.    You are strongly encouraged to contact your insurance carrier to verify that your procedure/test has been approved and is a COVERED benefit.  Although the [facility] Centralized Referral Team does its due diligence, the insurance carrier gives the disclaimer that \"Although the procedure is authorized, this does not guarantee payment.\"    Ultimately the patient is responsible for payment.   Thank you for your understanding in this matter.  Paperwork Completion:  If you require FMLA or disability paperwork for your recovery, please make sure to either drop it off or have it faxed to our office at [phone]. Be sure the form has your name and date of birth on it.  The form will be faxed to our Forms Department and they will complete it for you.  There is a 25$ fee for all forms that need to be filled out.  Please be aware there is a 10-14 day turnaround time.  You will need to sign a release of information (JOSE) form if your paperwork does not come with one.  You may call the Forms Department with any questions at [phone].  Their fax number is [phone].     You are scheduled for Left L1-L2 microdiscectomy and L3-L4 microdiscectomy on 4.28.25 with  at [facility].     PCP clearance is needed.  We have faxed a request for pre-op clearance to your PCP Dr Martines. Please contact their office for appointment.      You will need  pre operative labs which will include MRSA/MSSA testing.  You will need to contact the Pre-admission department at [phone] to schedule an appointment for your labs.     The Pre-Admission nurse will review your health history and give you information from the hospital. The nurse will also get you scheduled for all your pre-op testing required for your surgery.     Do not take any blood thinning medications such as over the counter non-steroidal antiinflammatories  (advil, aleve, ibuprofen etc.), herbal supplements (garlic,tumeric etc.), vitamin E, fish oil or krill oil for at least 7 days prior to surgery. You may only take Tylenol, Extra Strength Tylenol, Arthritis Tylenol, or prescription Norco or Tramadol for pain if something is needed.    You should have nothing to eat or drink after 11:00pm the night prior to surgery except for the following:    Do drink 12 ounces of regular Gatorade (NOT RED) 12 hours and 4 hours prior to your scheduled surgery time, Do not drink any other liquids (including water) before your surgery. Do not chew gum or eat candies before surgery.  Take 1000 mg of Tylenol (Acetaminophen) 4 hours before your scheduled surgery time, take this with your scheduled Gatorade.    In order to prevent infection, you will need to purchase Hibiclens soap and use it after your regular body soap for 5 days prior to your procedure.  The last shower should be the night before surgery.  This soap can be found at any pharmacy in the first aid section. See detailed instructions below.      Our office will get prior authorization for surgery through your insurance.     Surgery is usually scheduled as a 23 hour admission.  This is an estimate and varies from person to person.  Ultimately, the surgeon will determine when you are ready to be discharged.    The hospital will contact you 1-2 days before surgery with your arrival time.     If you were on blood thinners (such as Coumadin, Pradaxa, Xarelto, etc) prior to surgery that we had you stop for surgery, please make sure you get instructions about when to resume the medication before you are discharged from the hospital.    Per Dr. George's surgery protocol your appointments are as follows:    3 week post-op appointment scheduled on 5.19.25 at 10;45 am with GIULIA Lee    6 week post-op appointment scheduled on 6.10.25 at 3:00 pm with Dr George    3 month post surgery appointment scheduled on 7.22.25 at 2:45 pm with  GIULIA Lee          Restrictions for after surgery:  Lifting restriction of 10 pounds or less.  No bending or twisting.  No prolonged sitting or standing.  Driving is restricted for the first 1-2 weeks (this will vary from surgeon to surgeon.)  Fusion patients may require bracing such as TLSO or LSO brace. Braces are custom made to fit the patient.  Patient's are to keep their incisions covered for the first 3 days post surgery. After that they may leave the incision open to air. It is better for the healing process if the incision is left open to air.   May shower after the third day.  Do not scrub the incision and avoid any lotions or creams to the incision.    Please make sure to arrive at least 15 minutes prior to your scheduled appointment in order to get checked in and roomed in a timely manner.  Depending on provider availability, late patients may be required to reschedule.  REFILLS:  After surgery, please remember that we do have a 48 hour refill policy that does not include weekends, please make sure to request your medications in a timely manner so that you do not go days without medication.  *Refills should be requested through your pharmacy or through the refill request in [employer] (log in, go to medications, then select refill request).  [email or web address] MESSAGING:  Please remember that our office is closed during the weekend and no one is available for [employer] messages. If you have an urgent or emergent matter please go to a walk-in center or the emergency room. Also please remember your [employer] messages are part of your legal medical record and should not be utilized as a personal email with our providers as it is visible to all [facility] employees. Also, Since [employer] messages are not for emergent matters it may be several days before there is a response to your message.  FMLA/PAPERWORK COMPLETED BY OUR MEDICAL FORMS DEPARTMENT:  If you require FMLA paperwork for your surgery, please make sure to  either Drop it off or have it faxed to our office at [phone]. Make sure it has your NAME, , and has your signature. You will need to have a Release of Information on file. To facilitate this process we ask that you requested it at the  on your way out and sign it. Without a signed JOSE or signature on the form we will not be able to fax it and this will cause a delay with your forms. Fees charged for forms are $25 for initial submission and $15 for recertifications. If you have questions on the status of your forms please call the forms department at [phone].  **We do have a 3 week policy for all forms and paperwork, please make sure to allow plenty of time for completion. Same day paperwork will not be completed. **    Spine Navigator  You will have a 30 minute telehealth visit with our spine navigator approximately 2 weeks before your surgery. This visit is NOT optional. This appointment will get booked when you schedule your spinal surgery.  When speaking with Pre-admissions you will be told about a spine class as it relates to your surgery, this is an OPTIONAL class. The same or similar information will be discussed with you during your telehealth appointment with the spine navigator (Spine Navigator appointment is not optional). However, if you would like to have this information repeated to you or if you would feel more comfortable with additional information, you can elect to schedule this class during your pre-admissions phone call.  The Pre-op Spine Surgery Class is held at [facility] most  from 3:30-4:30 pm. Call Pre-admission Testing (PAT) to register at:  [phone]. Please [facility], check in at registration and meet in the [facility] lobby for your escort to class on the Ortho Spine unit. If unable to attend, but would like additional information, the class is available online at [email or web address].     Regarding current visitor  information:    Please visit the following website for the detailed and up-to-date visitor screening and restriction policy at [email or web address] [email or web address].    Hibiclens Bathing  Hibiclens is a body soap that is used before surgery protect you from getting an infection after surgery   Hibiclens comes in a large blue bottle and can be found in most pharmacies in the First Aid supplies  Shower with this daily for FIVE consecutive days before surgery, using the entire bottle over the five days.  The last shower should be the night before surgery.   Steps to bathing with Hibiclens  Do not use Hibiclens on your hair, face or private areas  Wash your hair and face as normal with your usual cleansers  Rinse well  Using a clean wet washcloth apply enough Hibiclens to cover your body. Wash from the neck down avoiding the genital areas and concentrating on the surgical area  Rinse well  Dry yourself with a clean, dry towel  Do not use any powders, creams, lotions or sprays on your body as these attract bacteria  Deodorant and facial creams are acceptable.   (Laundering/cleaning: Chlorhexidine gluconate skin cleansers will cause stains if used with chlorine releasing products. Rinse completely and use only non-chlorine detergents.)    For Office Use Only:    Medical Clearances Needed: pcp  PA: IMANI [employer]OSSM[employer] PPO  CPT Codes: 14035

## 2025-04-22 NOTE — TELEPHONE ENCOUNTER
Prior authorization request completed for: Left L1-L2 microdiscectomy and L3-L4 microdiscectomy    Authorization #NO PRIOR AUTHORIZATION REQUIRED   Authorization dates: 04/28/25  CPT codes approved: 36690, 64009, 54778  Number of visits/dates of service approved: Outpatient  Physician: Ariel  [place]    Call Ref#: [id number] for CPT 49062 then [id number] for 68699 when code was added to the case   Representative Name: Grant AMADOR   Insurance Carrier: [facility]

## 2025-04-23 ENCOUNTER — HOSPITAL ENCOUNTER (INPATIENT)
Facility: HOSPITAL | Age: 55
LOS: 5 days | Discharge: HOME | DRG: 520 | End: 2025-04-30
Attending: EMERGENCY MEDICINE | Admitting: STUDENT IN AN ORGANIZED HEALTH CARE EDUCATION/TRAINING PROGRAM
Payer: COMMERCIAL

## 2025-04-23 DIAGNOSIS — Z98.890 S/P LUMBAR MICRODISCECTOMY: ICD-10-CM

## 2025-04-23 DIAGNOSIS — M54.59 INTRACTABLE LOW BACK PAIN: Primary | ICD-10-CM

## 2025-04-23 PROCEDURE — 3E0333Z INTRODUCTION OF ANTI-INFLAMMATORY INTO PERIPHERAL VEIN, PERCUTANEOUS APPROACH: ICD-10-PCS | Performed by: STUDENT IN AN ORGANIZED HEALTH CARE EDUCATION/TRAINING PROGRAM

## 2025-04-23 PROCEDURE — 99223 1ST HOSP IP/OBS HIGH 75: CPT | Performed by: STUDENT IN AN ORGANIZED HEALTH CARE EDUCATION/TRAINING PROGRAM

## 2025-04-23 PROCEDURE — 99221 1ST HOSP IP/OBS SF/LOW 40: CPT | Performed by: NEUROLOGICAL SURGERY

## 2025-04-23 RX ORDER — MORPHINE SULFATE 4 MG/ML
4 INJECTION, SOLUTION INTRAMUSCULAR; INTRAVENOUS ONCE
Status: COMPLETED | OUTPATIENT
Start: 2025-04-23 | End: 2025-04-23

## 2025-04-23 RX ORDER — ROSUVASTATIN CALCIUM 5 MG/1
10 TABLET, COATED ORAL NIGHTLY
Status: DISCONTINUED | OUTPATIENT
Start: 2025-04-23 | End: 2025-04-30

## 2025-04-23 RX ORDER — SODIUM PHOSPHATE, DIBASIC AND SODIUM PHOSPHATE, MONOBASIC 7; 19 G/230ML; G/230ML
1 ENEMA RECTAL ONCE AS NEEDED
Status: DISCONTINUED | OUTPATIENT
Start: 2025-04-23 | End: 2025-04-29

## 2025-04-23 RX ORDER — TRAMADOL HYDROCHLORIDE 50 MG/1
50 TABLET ORAL EVERY 6 HOURS PRN
Status: DISCONTINUED | OUTPATIENT
Start: 2025-04-23 | End: 2025-04-29

## 2025-04-23 RX ORDER — DEXAMETHASONE SODIUM PHOSPHATE 4 MG/ML
6 VIAL (ML) INJECTION EVERY 6 HOURS
Status: DISCONTINUED | OUTPATIENT
Start: 2025-04-23 | End: 2025-04-29

## 2025-04-23 RX ORDER — ECHINACEA PURPUREA EXTRACT 125 MG
1 TABLET ORAL
Status: DISCONTINUED | OUTPATIENT
Start: 2025-04-23 | End: 2025-04-30

## 2025-04-23 RX ORDER — GUAIFENESIN 600 MG/1
600 TABLET, EXTENDED RELEASE ORAL 2 TIMES DAILY PRN
Status: DISCONTINUED | OUTPATIENT
Start: 2025-04-23 | End: 2025-04-30

## 2025-04-23 RX ORDER — MORPHINE SULFATE 2 MG/ML
2 INJECTION, SOLUTION INTRAMUSCULAR; INTRAVENOUS EVERY 2 HOUR PRN
Status: DISCONTINUED | OUTPATIENT
Start: 2025-04-23 | End: 2025-04-29

## 2025-04-23 RX ORDER — ONDANSETRON 2 MG/ML
4 INJECTION INTRAMUSCULAR; INTRAVENOUS EVERY 6 HOURS PRN
Status: DISCONTINUED | OUTPATIENT
Start: 2025-04-23 | End: 2025-04-29

## 2025-04-23 RX ORDER — POLYETHYLENE GLYCOL 3350 17 G/17G
17 POWDER, FOR SOLUTION ORAL DAILY PRN
Status: DISCONTINUED | OUTPATIENT
Start: 2025-04-23 | End: 2025-04-29

## 2025-04-23 RX ORDER — METHOCARBAMOL 500 MG/1
500 TABLET, FILM COATED ORAL EVERY 6 HOURS PRN
Status: DISCONTINUED | OUTPATIENT
Start: 2025-04-23 | End: 2025-04-29

## 2025-04-23 RX ORDER — BUPROPION HYDROCHLORIDE 150 MG/1
150 TABLET ORAL DAILY
Status: DISCONTINUED | OUTPATIENT
Start: 2025-04-23 | End: 2025-04-30

## 2025-04-23 RX ORDER — ACETAMINOPHEN 325 MG/1
650 TABLET ORAL EVERY 4 HOURS PRN
Status: DISCONTINUED | OUTPATIENT
Start: 2025-04-23 | End: 2025-04-29

## 2025-04-23 RX ORDER — KETOROLAC TROMETHAMINE 15 MG/ML
15 INJECTION, SOLUTION INTRAMUSCULAR; INTRAVENOUS ONCE
Status: COMPLETED | OUTPATIENT
Start: 2025-04-23 | End: 2025-04-23

## 2025-04-23 RX ORDER — TRAZODONE HYDROCHLORIDE 50 MG/1
TABLET ORAL NIGHTLY
COMMUNITY
End: 2025-04-30

## 2025-04-23 RX ORDER — MORPHINE SULFATE 4 MG/ML
4 INJECTION, SOLUTION INTRAMUSCULAR; INTRAVENOUS EVERY 2 HOUR PRN
Status: DISCONTINUED | OUTPATIENT
Start: 2025-04-23 | End: 2025-04-29

## 2025-04-23 RX ORDER — BUPROPION HYDROCHLORIDE 300 MG/1
300 TABLET ORAL DAILY
Status: DISCONTINUED | OUTPATIENT
Start: 2025-04-23 | End: 2025-04-30

## 2025-04-23 RX ORDER — CLONIDINE HYDROCHLORIDE 0.1 MG/1
0.1 TABLET ORAL NIGHTLY PRN
Status: DISCONTINUED | OUTPATIENT
Start: 2025-04-23 | End: 2025-04-30

## 2025-04-23 RX ORDER — BENZONATATE 100 MG/1
200 CAPSULE ORAL 3 TIMES DAILY PRN
Status: DISCONTINUED | OUTPATIENT
Start: 2025-04-23 | End: 2025-04-30

## 2025-04-23 RX ORDER — MORPHINE SULFATE 2 MG/ML
1 INJECTION, SOLUTION INTRAMUSCULAR; INTRAVENOUS EVERY 2 HOUR PRN
Status: DISCONTINUED | OUTPATIENT
Start: 2025-04-23 | End: 2025-04-29

## 2025-04-23 RX ORDER — BISACODYL 10 MG
10 SUPPOSITORY, RECTAL RECTAL
Status: DISCONTINUED | OUTPATIENT
Start: 2025-04-23 | End: 2025-04-29

## 2025-04-23 RX ORDER — HYDROCODONE BITARTRATE AND ACETAMINOPHEN 5; 325 MG/1; MG/1
1 TABLET ORAL EVERY 4 HOURS PRN
Status: DISCONTINUED | OUTPATIENT
Start: 2025-04-23 | End: 2025-04-29

## 2025-04-23 RX ORDER — PROPRANOLOL HYDROCHLORIDE 10 MG/1
10 TABLET ORAL DAILY
Status: DISCONTINUED | OUTPATIENT
Start: 2025-04-23 | End: 2025-04-30

## 2025-04-23 RX ORDER — PROCHLORPERAZINE EDISYLATE 5 MG/ML
5 INJECTION INTRAMUSCULAR; INTRAVENOUS EVERY 8 HOURS PRN
Status: DISCONTINUED | OUTPATIENT
Start: 2025-04-23 | End: 2025-04-29

## 2025-04-23 RX ORDER — PREGABALIN 75 MG/1
75 CAPSULE ORAL 2 TIMES DAILY
Status: DISCONTINUED | OUTPATIENT
Start: 2025-04-23 | End: 2025-04-30

## 2025-04-23 RX ORDER — TRAZODONE HYDROCHLORIDE 50 MG/1
TABLET ORAL NIGHTLY
Status: DISCONTINUED | OUTPATIENT
Start: 2025-04-23 | End: 2025-04-30

## 2025-04-23 RX ORDER — HYDROCODONE BITARTRATE AND ACETAMINOPHEN 5; 325 MG/1; MG/1
2 TABLET ORAL EVERY 4 HOURS PRN
Status: DISCONTINUED | OUTPATIENT
Start: 2025-04-23 | End: 2025-04-29

## 2025-04-23 RX ORDER — SENNOSIDES 8.6 MG
17.2 TABLET ORAL NIGHTLY PRN
Status: DISCONTINUED | OUTPATIENT
Start: 2025-04-23 | End: 2025-04-30

## 2025-04-23 RX ORDER — ACETAMINOPHEN 500 MG
500 TABLET ORAL EVERY 4 HOURS PRN
Status: DISCONTINUED | OUTPATIENT
Start: 2025-04-23 | End: 2025-04-29

## 2025-04-23 NOTE — ED INITIAL ASSESSMENT (HPI)
Severe back pain l1 and l3 disc herniations was told by spine surgery he was supposed to have surgery this upcoming Monday but pain is worse Left sided radiation down the leg.

## 2025-04-23 NOTE — DISCHARGE INSTRUCTIONS
Lumbar Microdiscectomy/Laminectomy Discharge Instructions      Activity  Restrictions  No twisting, pulling, pushing or lifting > 10 lbs.  No lifting anything over your head.  No bending at the waist  - you can bend at the knees but keep your back straight.    Sitting  Sit with your knees at about the same level as your hips; use a footstool if needed.  Do not sit in soft or overstuffed chairs. Firm chairs with straight backs give better support.   Recliners are OK but may need to add pillows in order to not sink into the chair.  Use a raised toilet seat if needed.  Change position every 20-30 minutes when sitting (example: after sitting, stand, then you can sit again for another 20-30 minutes).    Walking is your best exercise.  Listen to your body.  Walk several times a day  Smaller distances are better.  Your goal is to increase the distance you walk each day.  Remember to listen to your body    Sex  After 2 weeks, when comfortable and approved by your surgeon.  Stop if causing pain.    Driving  Usually allowed after  2-3 weeks;  check with physician at first office visit.  Do Not drive while taking narcotics or muscle relaxants.  Adhere to sitting restrictions.    Stairs  Climb stairs as needed    Incision site care and dressing  Changes as directed by your surgeon    IF DERMABOND (GLUE)  May leave open to air or apply and change dressing once a day using dry sterile gauze and paper tape. May prevent clothing from rubbing incision.   Watch incision for any redness, drainage, increased warmth or opening of the incision.   Call surgeon if you notice any of these.  No lotions or ointments on or near incision.     Always wash hands before and after dressing changes.                                Bathing  No tub baths, pools, or saunas for 6 weeks and until cleared by surgeon.  When able to shower, you may remove gauze dressing beforehand.  After showering, pat incision dry and may apply new dry dressing.  Do not  apply any lotions or ointments to incision.         Return to work  When cleared by surgeon. Discuss specific work activities with your surgeon at follow up visit.  Light to sedentary work may be possible 2-3 weeks post op  Heavy work may be possible 6 weeks post op.    Sleeping  Use a firm mattress.  Lay on side or back, not stomach.  May use pillow under knees, between legs or behind back if lying on your side.  Log roll to turn.    Diet and constipation prevention  Drink six to eight glasses liquid (water, juice) per day.  Eat a high fiber diet (bran, vegetables, fruit).  Use an over the counter stool softener such as Colace or senakot while taking narcotics to prevent constipation; use laxatives such as Miralax or Milk of Magnesia as needed.  An enema or suppository may be necessary if above measures do not work.        No smoking  Smoking will inhibit healing.  Even one cigarette a day will cause problems.  Chewing tobacco, nicotine gum or patches will also inhibit healing.      Pain Management  Relaxation techniques  A way to focus your attention other than on your pain. Your brain makes endorphins that are a natural body chemical that can decrease pain. Some examples of relaxation techniques are deep breathing, listening to music or meditating.  May use Cold therapy for 20 minutes multiple times per day.  Be sure there is a cloth barrier between skin and cold therapy.  Medication  No NSAIDS until OK with your surgeon.   NSAIDS (non-steroidal anti- inflammatory) include: Motrin, ibuprofen, Advil, Aleve, Naprosyn, Indocin, Nuprin, Vioxx, Celebrex, Bextra.(COMPLETE LIST IN GUIDEBOOK APPENDIX)  Tylenol (acetaminophen) is okay. Caution if your pain med contains Tylenol (acetaminophen); maximum 3 gms of Tylenol (acetaminophen) in 24 hours.  A single aspirin for the heart is ok if ordered by your physician.  Take muscle relaxants and pain medications as prescribed allowing 30-45 minutes to take effect.  Do NOT take  alcohol while on pain medication.  Monitor need for pain medication closely  Call pharmacy or physician office at least five days before out of pain medication. If calling physician office after 3 pm, it will be handled on the next business day.    Post op office visit  Schedule 2 weeks after surgery with surgeon as directed at discharge  Schedule one week follow up with Primary Care Physician. Review all medications.      When to contact your surgeon  Temp > 101F; Take your temperature twice a day  Increased pain, swelling, redness, or any drainage to incision.  Separation of incision.  Sudden reappearance of pain that won’t go away with pain medication.  New numbness or weakness to arms or legs.  Difficulty urinating or having bowel movements  Headaches that worsen when standing and resolve when laying flat.    Go directly to the ER or CALL 911 if you:  become short of breath  have chest pain  cough up blood  have unexplained anxiety with breathing
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
## 2025-04-23 NOTE — ED PROVIDER NOTES
Patient Seen in: [facility] Emergency Department      History     Chief Complaint   Patient presents with    Back Pain     Stated Complaint: injury last week to back.  herniation to L1 L3, pain is worse.    Subjective:   HPI    54-year-old man no past medical history who presents with lumbar back pain.  Has a history of disc herniation with planned discectomy following with Dr. George.  Has been progressively worsening since his injury 2 weeks ago.  Predominantly left-sided lower back pain radiating down his left leg.  No bowel or bladder incontinence.  No numbness or weakness.  History of Present Illness               Objective:     No pertinent past medical history.            No pertinent past surgical history.              No pertinent social history.                              Physical Exam     ED Triage Vitals   BP 04/23/25 0953 (!) 147/106   Pulse 04/23/25 0953 105   Resp 04/23/25 0953 22   Temp 04/23/25 0953 98.4 °F (36.9 °C)   Temp src 04/23/25 0953 Temporal   SpO2 04/23/25 1034 100 %   O2 Device --        Current Vitals:   Vital Signs  BP: 121/71  Pulse: 57  Resp: 22  Temp: 98.4 °F (36.9 °C)  Temp src: Temporal  MAP (mmHg): 86    Oxygen Therapy  SpO2: 100 %        Physical Exam  Constitutional: Uncomfortable  HENT:      Head: Normocephalic and atraumatic.      Mouth/Throat:      Mouth: Mucous membranes are moist.   Eyes:      Conjunctiva/sclera: Conjunctivae normal.      Pupils: Pupils are equal, round, and reactive to light.   Cardiovascular:      Rate and Rhythm: Normal rate and regular rhythm.   Pulmonary:      Effort: Pulmonary effort is normal. No respiratory distress.      Breath sounds: Normal breath sounds.   Abdominal:      General: Abdomen is flat. There is no distension.      Tenderness: There is no abdominal tenderness.   Musculoskeletal: Left-sided lumbar tenderness     Right lower leg: No edema.      Left lower leg: No edema.   Skin:     General: Skin is warm and dry.   Neurological:  Sensation and strength intact in lower extremities     General: No focal deficit present.      Mental Status: Is alert.     Physical Exam                ED Course   Labs Reviewed - No data to display       Results                                 MDM      Considered all emergent etiologies, differential includes but is not limited to: Cauda equina, sciatica, epidural abscess or hematoma     I have independently visualized the radiology images, my focus/limited interpretation: N/A     Defer to radiologist for other/incidental findings    Labs reviewed from yesterday which were largely unrevealing.  Patient with intractable pain not responding to multiple rounds of parenteral narcotics and Toradol.  Has planned surgery with Dr. George and discussed with covering neurosurgery Dr. Frias.  Will admit for pain control and further evaluation, discussed with Dr. Hernandez for admission.    Admission disposition: 4/23/2025 12:36 PM           Medical Decision Making      Disposition and Plan     Clinical Impression:  1. Intractable low back pain         Disposition:  Admit  4/23/2025 12:36 pm    Follow-up:  No follow-up provider specified.        Medications Prescribed:  Current Discharge Medication List          Supplementary Documentation:         Hospital Problems       Present on Admission  Date Reviewed: 4/22/2025          ICD-10-CM Noted POA    * (Principal) Intractable low back pain M54.59 4/23/2025 Unknown
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
## 2025-04-23 NOTE — PLAN OF CARE
Admission navigator completed.  Patient independent with ADLs.  Wears a mouthpiece for LINCOLN but doesn't have it here.  Has his own walker here.

## 2025-04-23 NOTE — ED QUICK NOTES
Orders for admission, patient is aware of plan and ready to go upstairs. Any questions, please call ED RN Elroy at extension [phone]     Patient Covid vaccination status: Fully vaccinated     COVID Test Ordered in ED: None    COVID Suspicion at Admission: N/A    Running Infusions:  None    Mental Status/LOC at time of transport: Alert    Other pertinent information:   CIWA score: N/A   NIH score:  N/A

## 2025-04-24 LAB
ALBUMIN SERPL-MCNC: 4.5 G/DL (ref 3.2–4.8)
ALBUMIN/GLOB SERPL: 2 {RATIO} (ref 1–2)
ALP LIVER SERPL-CCNC: 56 U/L (ref 45–117)
ALT SERPL-CCNC: 44 U/L (ref 10–49)
ANION GAP SERPL CALC-SCNC: 11 MMOL/L (ref 0–18)
AST SERPL-CCNC: 17 U/L (ref ?–34)
BASOPHILS # BLD AUTO: 0.02 X10(3) UL (ref 0–0.2)
BASOPHILS NFR BLD AUTO: 0.2 %
BILIRUB SERPL-MCNC: 0.9 MG/DL (ref 0.3–1.2)
BUN BLD-MCNC: 18 MG/DL (ref 9–23)
CALCIUM BLD-MCNC: 9.5 MG/DL (ref 8.7–10.6)
CHLORIDE SERPL-SCNC: 105 MMOL/L (ref 98–112)
CO2 SERPL-SCNC: 22 MMOL/L (ref 21–32)
CREAT BLD-MCNC: 0.87 MG/DL (ref 0.7–1.3)
EGFRCR SERPLBLD CKD-EPI 2021: 104 ML/MIN/1.73M2 (ref 60–?)
EOSINOPHIL # BLD AUTO: 0 X10(3) UL (ref 0–0.7)
EOSINOPHIL NFR BLD AUTO: 0 %
ERYTHROCYTE [DISTWIDTH] IN BLOOD BY AUTOMATED COUNT: 13.9 %
GLOBULIN PLAS-MCNC: 2.3 G/DL (ref 2–3.5)
GLUCOSE BLD-MCNC: 142 MG/DL (ref 70–99)
HCT VFR BLD AUTO: 48.4 % (ref 39–53)
HGB BLD-MCNC: 16.2 G/DL (ref 13–17.5)
IMM GRANULOCYTES # BLD AUTO: 0.07 X10(3) UL (ref 0–1)
IMM GRANULOCYTES NFR BLD: 0.6 %
LYMPHOCYTES # BLD AUTO: 1.14 X10(3) UL (ref 1–4)
LYMPHOCYTES NFR BLD AUTO: 9.4 %
MCH RBC QN AUTO: 30.5 PG (ref 26–34)
MCHC RBC AUTO-ENTMCNC: 33.5 G/DL (ref 31–37)
MCV RBC AUTO: 91 FL (ref 80–100)
MONOCYTES # BLD AUTO: 0.17 X10(3) UL (ref 0.1–1)
MONOCYTES NFR BLD AUTO: 1.4 %
NEUTROPHILS # BLD AUTO: 10.72 X10 (3) UL (ref 1.5–7.7)
NEUTROPHILS # BLD AUTO: 10.72 X10(3) UL (ref 1.5–7.7)
NEUTROPHILS NFR BLD AUTO: 88.4 %
OSMOLALITY SERPL CALC.SUM OF ELEC: 290 MOSM/KG (ref 275–295)
PLATELET # BLD AUTO: 513 10(3)UL (ref 150–450)
POTASSIUM SERPL-SCNC: 4.7 MMOL/L (ref 3.5–5.1)
PROT SERPL-MCNC: 6.8 G/DL (ref 5.7–8.2)
RBC # BLD AUTO: 5.32 X10(6)UL (ref 4.3–5.7)
SODIUM SERPL-SCNC: 138 MMOL/L (ref 136–145)
WBC # BLD AUTO: 12.1 X10(3) UL (ref 4–11)

## 2025-04-24 PROCEDURE — 99232 SBSQ HOSP IP/OBS MODERATE 35: CPT | Performed by: STUDENT IN AN ORGANIZED HEALTH CARE EDUCATION/TRAINING PROGRAM

## 2025-04-24 NOTE — PROGRESS NOTES
[facility]  Neurosurgery Progress Note    Shin Liang Patient Status:  Observation    1970 MRN [id number]   Location [facility] 3SW-A Attending Jarred Hernandez, DO   Hosp Day # 0 PCP Hemal Martines MD     Chief Complaint:  Intractable Low Back pain    Subjective:  Continues to have sever pain with movement. Does not feel comfortable in going home. Sharp radicular pain down LLE and left side of scrotum.     Objective/Physical Exam:    Vital Signs:  Blood pressure 117/78, pulse 92, temperature 98 °F (36.7 °C), temperature source Oral, resp. rate 18, SpO2 95%.  Respiratory:  nonlabored  CV:  well perfused  General: NAD, Speech Fluent, Mood Appropriate  Neurologic:   A&Ox3  PERRL, EOMi, FS, TM  Full strength x 4, no drift    Labs:  Recent Labs   Lab 25  1421 25  0612   RBC 5.53 5.32   HGB 16.9 16.2   HCT 49.5 48.4   MCV 89.5 91.0   MCH 30.6 30.5   MCHC 34.1 33.5   RDW 13.8 13.9   NEPRELIM 6.41 10.72*   WBC 10.9 12.1*   .0* 513.0*       Recent Labs   Lab 25  1421 25  0612   GLU 93 142*   BUN 19 18   CREATSERUM 0.95 0.87   EGFRCR 96 104   CA 9.4 9.5    138   K 3.7 4.7    105   CO2 28.0 22.0       Imaging:  XR PAIN CLINIC FLUOROSCOPY - N/C  Result Date: 4/15/2025  CONCLUSION:  Fluoroscopy provided by technologist for pain clinic purposes only.   LOCATION:  [place]    Dictated by (CST): Letty Morgan MD on 4/15/2025 at 9:17 AM     Finalized by (CST): Letty Morgan MD on 4/15/2025 at 9:17 AM       MRI SPINE LUMBAR (CPT=72148)  Result Date: 2025  CONCLUSION:  There is a new left-sided lateral disc protrusion extending into the left neural foramen at L1-2 with probable impingement of the exiting left L1 nerve root.  There is a left-sided foraminal disc protrusion which appears new.  This is associated with mild left neural foraminal narrowing.  There is disc likely abutting the left L3 nerve root..   LOCATION:  [id number]   Dictated by (CST):  Letty Morgan MD on 4/13/2025 at 9:38 AM     Finalized by (CST): Letty Morgan MD on 4/13/2025 at 9:42 AM       XR LUMBAR SPINE (MIN 4 VIEWS) (CPT=72110)  Result Date: 4/12/2025  CONCLUSION:  Mild degenerative changes of L4-5 and L5-S1.   LOCATION:  [place]   Dictated by (CST): Sam Trevino MD on 4/12/2025 at 8:25 PM     Finalized by (CST): Sam Trevino MD on 4/12/2025 at 8:26 PM        Assessment:  53 yo male with intractable back pain    Plan:  Medical management and pain control per Hospitalist  Decadron 6mg q6  Lyrica 75mg BID  OOB as tolerated  Plan for surgery on Monday  DVT prophylaxis SCD    Is this a shared or split note between Advanced Practice Provider and Physician? KAYLA Uriostegui  [facility]  4/24/2025, 1:19 PM  [place] [phone]

## 2025-04-24 NOTE — PLAN OF CARE
Patient AO x 4.  VSS, on room air.  Medications given as ordered.  Ambulated with walker/SB assist.  Plan for IV steroids, PRN pain medications, sx 4/28/25.

## 2025-04-24 NOTE — PROGRESS NOTES
[facility]   part of [facility]     Hospitalist Progress Note     Shin Liang Patient Status:  Observation    1970 MRN [id number]   Location [facility] 3SW-A Attending Jarred Hernandez, DO   Hosp Day # 0 PCP Hemal Martines MD     Chief Complaint: Back pain    Subjective:     Patient resting in bed and reports improvement in pain    Objective:    Review of Systems:   A comprehensive review of systems was completed; pertinent positive and negatives stated in subjective.    Vital signs:  Temp:  [97.9 °F (36.6 °C)-98.2 °F (36.8 °C)] 98.2 °F (36.8 °C)  Pulse:  [57-96] 96  Resp:  [16-20] 18  BP: (113-149)/(56-85) 120/80  SpO2:  [93 %-100 %] 93 %    Physical Exam:    General: No acute distress  Respiratory: No wheezes, no rhonchi  Cardiovascular: S1, S2, regular rate and rhythm  Abdomen: Soft, Non-tender, non-distended, positive bowel sounds  Neuro: No new focal deficits.   Extremities: No edema      Diagnostic Data:    Labs:  Recent Labs   Lab 25  1421 25  0612   WBC 10.9 12.1*   HGB 16.9 16.2   MCV 89.5 91.0   .0* 513.0*   INR 0.97  --        Recent Labs   Lab 25  1421 25  0612   GLU 93 142*   BUN 19 18   CREATSERUM 0.95 0.87   CA 9.4 9.5   ALB 4.6 4.5    138   K 3.7 4.7    105   CO2 28.0 22.0   ALKPHO 56 56   AST 26 17   ALT 69* 44   BILT 1.1 0.9   TP 7.0 6.8       Estimated Glomerular Filtration Rate: 104 mL/min/1.73m2 (result from lab).    No results for input(s): \"TROP\", \"TROPHS\", \"CK\" in the last 168 hours.    Recent Labs   Lab 25  1421   PTP 13.0   INR 0.97                  Microbiology    No results found for this visit on 25.      Imaging: Reviewed in Epic.    Medications: Scheduled Medications[1]    Assessment & Plan:      #Intractable back pain  #Lumbar disc herniation  - Initially planned for L1-L2, L3-L4 discectomy on Monday  - IV steroids  - Pain control as needed  - Neurosurgery consulted     #Hyperlipidemia  - Statin      #Anxiety  #Depression  - Bupropion      Jarred Hernandez DO    Supplementary Documentation:     Quality:  DVT Mechanical Prophylaxis:   SCDs,    DVT Pharmacologic Prophylaxis   Medication   None                Code Status: Full Code  Moody: No urinary catheter in place  Moody Duration (in days):   Central line:    NICOLA:     Discharge is dependent on: Clinical improvement  At this point Mr. Liang is expected to be discharge to: Home    The 21st Century Cures Act makes medical notes like these available to patients in the interest of transparency. Please be advised this is a medical document. Medical documents are intended to carry relevant information, facts as evident, and the clinical opinion of the practitioner. The medical note is intended as peer to peer communication and may appear blunt or direct. It is written in medical language and may contain abbreviations or verbiage that are unfamiliar.                         [1]    buPROPion ER  150 mg Oral Daily    buPROPion ER  300 mg Oral Daily    propranolol  10 mg Oral Daily    rosuvastatin  10 mg Oral Nightly    traZODone   mg Oral Nightly    vortioxetine  20 mg Oral Daily    dexamethasone  6 mg Intravenous Q6H    pregabalin  75 mg Oral BID

## 2025-04-24 NOTE — TELEPHONE ENCOUNTER
PCP presurgical clearance received per OV note in chart dated 4.22.25, \"Patient is a low risk for a low risk surgery he is cleared for surgery.\"

## 2025-04-24 NOTE — PLAN OF CARE
Patient A&Ox4 . On room air . Vital signs stable . Gets up with min assist with walker . On iv steroids . Denies any numbness or tingling. Pain is in the left lower back radiating to left leg. Pain controlled with norco . Scd's on . Safety precaution in place. Reminded to \"call don't fall\" . Plan for surgery 4/28 .

## 2025-04-25 LAB
ATRIAL RATE: 61 BPM
P AXIS: 81 DEGREES
P-R INTERVAL: 136 MS
Q-T INTERVAL: 390 MS
QRS DURATION: 90 MS
QTC CALCULATION (BEZET): 392 MS
R AXIS: 52 DEGREES
RH BLOOD TYPE: NEGATIVE
T AXIS: 64 DEGREES
VENTRICULAR RATE: 61 BPM

## 2025-04-25 PROCEDURE — 99231 SBSQ HOSP IP/OBS SF/LOW 25: CPT | Performed by: NEUROLOGICAL SURGERY

## 2025-04-25 PROCEDURE — 99232 SBSQ HOSP IP/OBS MODERATE 35: CPT | Performed by: STUDENT IN AN ORGANIZED HEALTH CARE EDUCATION/TRAINING PROGRAM

## 2025-04-25 RX ORDER — DOCUSATE SODIUM 100 MG/1
100 CAPSULE, LIQUID FILLED ORAL 2 TIMES DAILY
Status: DISCONTINUED | OUTPATIENT
Start: 2025-04-25 | End: 2025-04-29

## 2025-04-25 RX ORDER — BISACODYL 10 MG
10 SUPPOSITORY, RECTAL RECTAL
Status: DISCONTINUED | OUTPATIENT
Start: 2025-04-25 | End: 2025-04-25

## 2025-04-25 NOTE — PROGRESS NOTES
[facility]   part of [facility]    Neurosurgery Progress Note  2025    Shin Liang Patient Status:  Observation    1970 MRN [id number]   Location [facility] 3SW-A Attending Jarred Hernandez,    Hosp Day # 0 PCP Hemal Martines MD     SUBJECTIVE:  Shin Liang is a(n) 54 year old male with LLE radiculopathy.  Doing better with steroids.        OBJECTIVE / PHYSICAL EXAM:  Vital Signs:  Blood pressure 133/74, pulse 79, temperature 98.2 °F (36.8 °C), temperature source Oral, resp. rate 18, SpO2 95%.    Neuro:    Motor: MORROW    Sensory: intact to LT      Lab Results (last 24 hours):  No results found for this or any previous visit (from the past 24 hours).    Review of Imaging  none    Assessment/Plan:  LLE radiculopathy  Maintain IV steroids  Preop for surgery Monday    Manas James DO    2025  11:52 AM

## 2025-04-25 NOTE — PLAN OF CARE
Patient up ambulated in halls and room .Tolerated well . Vital signs stable . HR in the low 100's ,denies any chest pain or short of breath . Pain improved from yesterday . On iv steroids and norco as needed for pain . Denies any numbness or tingling.voiding well . Safety precautions in place . Plan for surgery 4/28 . Will continue to monitor .

## 2025-04-25 NOTE — PLAN OF CARE
Patient is A&Ox4 VSS on room air. Pain to back and leg are minimal. Patient able to ambulate halls and take a shower this morning. CHG wash completed. Plan for OR on 4/28.

## 2025-04-25 NOTE — PROGRESS NOTES
[facility]   part of [facility]     Hospitalist Progress Note     Shin Liang Patient Status:  Observation    1970 MRN [id number]   Location [facility] 3SW-A Attending Jarred Hernandez, DO   Hosp Day # 0 PCP Hemal Martines MD     Chief Complaint: Back pain    Subjective:     Patient resting in bed and reports no pain     Objective:    Review of Systems:   A comprehensive review of systems was completed; pertinent positive and negatives stated in subjective.    Vital signs:  Temp:  [98 °F (36.7 °C)-98.3 °F (36.8 °C)] 98.2 °F (36.8 °C)  Pulse:  [77-99] 79  Resp:  [16-18] 18  BP: (122-152)/(74-89) 133/74  SpO2:  [94 %-95 %] 95 %    Physical Exam:    General: No acute distress  Respiratory: No wheezes, no rhonchi  Cardiovascular: S1, S2, regular rate and rhythm  Abdomen: Soft, Non-tender, non-distended, positive bowel sounds  Neuro: No new focal deficits.   Extremities: No edema      Diagnostic Data:    Labs:  Recent Labs   Lab 25  1421 25  0612   WBC 10.9 12.1*   HGB 16.9 16.2   MCV 89.5 91.0   .0* 513.0*   INR 0.97  --        Recent Labs   Lab 25  1421 25  0612   GLU 93 142*   BUN 19 18   CREATSERUM 0.95 0.87   CA 9.4 9.5   ALB 4.6 4.5    138   K 3.7 4.7    105   CO2 28.0 22.0   ALKPHO 56 56   AST 26 17   ALT 69* 44   BILT 1.1 0.9   TP 7.0 6.8       Estimated Glomerular Filtration Rate: 104 mL/min/1.73m2 (result from lab).    No results for input(s): \"TROP\", \"TROPHS\", \"CK\" in the last 168 hours.    Recent Labs   Lab 25  1421   PTP 13.0   INR 0.97                  Microbiology    No results found for this visit on 25.      Imaging: Reviewed in Epic.    Medications: Scheduled Medications[1]    Assessment & Plan:      #Intractable back pain  #Lumbar disc herniation  - Plan for L1-L2, L3-L4 discectomy on Monday  - IV steroids  - Pain control as needed  - Neurosurgery following      #Hyperlipidemia  - Statin     #Anxiety  #Depression  -  Bupropion      Jarred Hernandez DO    Supplementary Documentation:     Quality:  DVT Mechanical Prophylaxis:   SCDs,    DVT Pharmacologic Prophylaxis   Medication   None                Code Status: Full Code  Moody: No urinary catheter in place  Moody Duration (in days):   Central line:    NICOLA:     Discharge is dependent on: Clinical improvement  At this point Mr. Liang is expected to be discharge to: Home    The 21st Century Cures Act makes medical notes like these available to patients in the interest of transparency. Please be advised this is a medical document. Medical documents are intended to carry relevant information, facts as evident, and the clinical opinion of the practitioner. The medical note is intended as peer to peer communication and may appear blunt or direct. It is written in medical language and may contain abbreviations or verbiage that are unfamiliar.                         [1]    docusate sodium  100 mg Oral BID    buPROPion ER  150 mg Oral Daily    buPROPion ER  300 mg Oral Daily    propranolol  10 mg Oral Daily    rosuvastatin  10 mg Oral Nightly    traZODone   mg Oral Nightly    vortioxetine  20 mg Oral Daily    dexamethasone  6 mg Intravenous Q6H    pregabalin  75 mg Oral BID

## 2025-04-26 PROCEDURE — 99232 SBSQ HOSP IP/OBS MODERATE 35: CPT | Performed by: STUDENT IN AN ORGANIZED HEALTH CARE EDUCATION/TRAINING PROGRAM

## 2025-04-26 NOTE — PROGRESS NOTES
[place]   part of [facility]     Hospitalist Progress Note     Shin Liang Patient Status:  Observation    1970 MRN [id number]   Location [facility] 3SW-A Attending Jarred Hernandez,    Hosp Day # 1 PCP Hemal Martines MD     Chief Complaint: Back pain    Subjective:     Patient resting in bed and reports no pain     Objective:    Review of Systems:   A comprehensive review of systems was completed; pertinent positive and negatives stated in subjective.    Vital signs:  Temp:  [97.4 °F (36.3 °C)-99 °F (37.2 °C)] 98.1 °F (36.7 °C)  Pulse:  [75-98] 85  Resp:  [16-20] 16  BP: (109-150)/(46-85) 131/69  SpO2:  [96 %] 96 %    Physical Exam:    General: No acute distress  Respiratory: No wheezes, no rhonchi  Cardiovascular: S1, S2, regular rate and rhythm  Abdomen: Soft, Non-tender, non-distended, positive bowel sounds  Neuro: No new focal deficits.   Extremities: No edema      Diagnostic Data:    Labs:  Recent Labs   Lab 25  1421 25  0612   WBC 10.9 12.1*   HGB 16.9 16.2   MCV 89.5 91.0   .0* 513.0*   INR 0.97  --        Recent Labs   Lab 25  1421 25  0612   GLU 93 142*   BUN 19 18   CREATSERUM 0.95 0.87   CA 9.4 9.5   ALB 4.6 4.5    138   K 3.7 4.7    105   CO2 28.0 22.0   ALKPHO 56 56   AST 26 17   ALT 69* 44   BILT 1.1 0.9   TP 7.0 6.8       Estimated Glomerular Filtration Rate: 104 mL/min/1.73m2 (result from lab).    No results for input(s): \"TROP\", \"TROPHS\", \"CK\" in the last 168 hours.    Recent Labs   Lab 25  1421   PTP 13.0   INR 0.97                  Microbiology    No results found for this visit on 25.      Imaging: Reviewed in Epic.    Medications: Scheduled Medications[1]    Assessment & Plan:      #Intractable back pain  #Lumbar disc herniation  - Plan for L1-L2, L3-L4 discectomy on Monday  - IV steroids  - Pain control as needed  - Neurosurgery following      #Hyperlipidemia  - Statin     #Anxiety  #Depression  -  Bupropion      Jarred Hernandez DO    Supplementary Documentation:     Quality:  DVT Mechanical Prophylaxis:   SCDs,    DVT Pharmacologic Prophylaxis   Medication   None                Code Status: Full Code  Moody: No urinary catheter in place  Moody Duration (in days):   Central line:    NICOLA:     Discharge is dependent on: Clinical improvement  At this point Mr. Liang is expected to be discharge to: Home    The 21st Century Cures Act makes medical notes like these available to patients in the interest of transparency. Please be advised this is a medical document. Medical documents are intended to carry relevant information, facts as evident, and the clinical opinion of the practitioner. The medical note is intended as peer to peer communication and may appear blunt or direct. It is written in medical language and may contain abbreviations or verbiage that are unfamiliar.                         [1]    docusate sodium  100 mg Oral BID    buPROPion ER  150 mg Oral Daily    buPROPion ER  300 mg Oral Daily    propranolol  10 mg Oral Daily    rosuvastatin  10 mg Oral Nightly    traZODone   mg Oral Nightly    vortioxetine  20 mg Oral Daily    dexamethasone  6 mg Intravenous Q6H    pregabalin  75 mg Oral BID

## 2025-04-26 NOTE — PLAN OF CARE
Patient alert and oriented x4. VSS, on RA. Tele in place; NSR. Patient reports only slight pain with movement. Patient found doing a weak \"jog\" in hallway, asked to walk, no trouble ambulating. IV steroids per orders. Tolerating PO intake.       Plan: OR Monday. IV steroid per orders.

## 2025-04-26 NOTE — PLAN OF CARE
Pain to lower back and LLE minimal at present, declined prn medication. BP stable, afebrile, on RA, NSR on tele monitoring. Denies n/t to ble, some limitation to lt leg with movement d/t pain. Plan for surgery on 4/28.

## 2025-04-27 LAB
ANION GAP SERPL CALC-SCNC: 7 MMOL/L (ref 0–18)
ANTIBODY SCREEN: NEGATIVE
APTT PPP: 21.3 SECONDS (ref 23–36)
BASOPHILS # BLD AUTO: 0.03 X10(3) UL (ref 0–0.2)
BASOPHILS NFR BLD AUTO: 0.2 %
BUN BLD-MCNC: 17 MG/DL (ref 9–23)
CALCIUM BLD-MCNC: 9.1 MG/DL (ref 8.7–10.6)
CHLORIDE SERPL-SCNC: 103 MMOL/L (ref 98–112)
CO2 SERPL-SCNC: 26 MMOL/L (ref 21–32)
CREAT BLD-MCNC: 0.94 MG/DL (ref 0.7–1.3)
EGFRCR SERPLBLD CKD-EPI 2021: 98 ML/MIN/1.73M2 (ref 60–?)
EOSINOPHIL # BLD AUTO: 0 X10(3) UL (ref 0–0.7)
EOSINOPHIL NFR BLD AUTO: 0 %
ERYTHROCYTE [DISTWIDTH] IN BLOOD BY AUTOMATED COUNT: 13.8 %
GLUCOSE BLD-MCNC: 118 MG/DL (ref 70–99)
HCT VFR BLD AUTO: 42.9 % (ref 39–53)
HGB BLD-MCNC: 14.5 G/DL (ref 13–17.5)
IMM GRANULOCYTES # BLD AUTO: 0.09 X10(3) UL (ref 0–1)
IMM GRANULOCYTES NFR BLD: 0.5 %
INR BLD: 0.97 (ref 0.8–1.2)
LYMPHOCYTES # BLD AUTO: 1.09 X10(3) UL (ref 1–4)
LYMPHOCYTES NFR BLD AUTO: 6.3 %
MCH RBC QN AUTO: 30.2 PG (ref 26–34)
MCHC RBC AUTO-ENTMCNC: 33.8 G/DL (ref 31–37)
MCV RBC AUTO: 89.4 FL (ref 80–100)
MONOCYTES # BLD AUTO: 0.7 X10(3) UL (ref 0.1–1)
MONOCYTES NFR BLD AUTO: 4 %
NEUTROPHILS # BLD AUTO: 15.41 X10 (3) UL (ref 1.5–7.7)
NEUTROPHILS # BLD AUTO: 15.41 X10(3) UL (ref 1.5–7.7)
NEUTROPHILS NFR BLD AUTO: 89 %
OSMOLALITY SERPL CALC.SUM OF ELEC: 285 MOSM/KG (ref 275–295)
PLATELET # BLD AUTO: 466 10(3)UL (ref 150–450)
POTASSIUM SERPL-SCNC: 4.4 MMOL/L (ref 3.5–5.1)
PROTHROMBIN TIME: 13 SECONDS (ref 11.6–14.8)
RBC # BLD AUTO: 4.8 X10(6)UL (ref 4.3–5.7)
RH BLOOD TYPE: NEGATIVE
SODIUM SERPL-SCNC: 136 MMOL/L (ref 136–145)
WBC # BLD AUTO: 17.3 X10(3) UL (ref 4–11)

## 2025-04-27 PROCEDURE — 99232 SBSQ HOSP IP/OBS MODERATE 35: CPT | Performed by: STUDENT IN AN ORGANIZED HEALTH CARE EDUCATION/TRAINING PROGRAM

## 2025-04-27 NOTE — PLAN OF CARE
Patient alert and oriented x4. VSS, on RA. Tele in place; NSR. Patient reports only slight pain with movement. Ambulating often in room and hallway. IV steroids per orders. Tolerating PO intake.         Plan: OR Tomorrow.

## 2025-04-27 NOTE — PROGRESS NOTES
[facility]   part of [facility]     Hospitalist Progress Note     Shin Liang Patient Status:  Observation    1970 MRN [id number]   Location [facility] 3SW-A Attending Jarred Hernandez,    Hosp Day # 2 PCP Hemal Martines MD     Chief Complaint: Back pain    Subjective:     Patient feels well and has no complaints    Objective:    Review of Systems:   A comprehensive review of systems was completed; pertinent positive and negatives stated in subjective.    Vital signs:  Temp:  [97.8 °F (36.6 °C)-98.6 °F (37 °C)] 98.4 °F (36.9 °C)  Pulse:  [79-95] 80  Resp:  [16-18] 18  BP: (124-154)/(56-84) 131/75  SpO2:  [92 %-96 %] 95 %    Physical Exam:    General: No acute distress  Respiratory: No wheezes, no rhonchi  Cardiovascular: S1, S2, regular rate and rhythm  Abdomen: Soft, Non-tender, non-distended, positive bowel sounds  Neuro: No new focal deficits.   Extremities: No edema      Diagnostic Data:    Labs:  Recent Labs   Lab 25  1421 25  0612 25  0612   WBC 10.9 12.1* 17.3*   HGB 16.9 16.2 14.5   MCV 89.5 91.0 89.4   .0* 513.0* 466.0*   INR 0.97  --  0.97       Recent Labs   Lab 25  1421 25  0612 25  0612   GLU 93 142* 118*   BUN 19 18 17   CREATSERUM 0.95 0.87 0.94   CA 9.4 9.5 9.1   ALB 4.6 4.5  --     138 136   K 3.7 4.7 4.4    105 103   CO2 28.0 22.0 26.0   ALKPHO 56 56  --    AST 26 17  --    ALT 69* 44  --    BILT 1.1 0.9  --    TP 7.0 6.8  --        Estimated Glomerular Filtration Rate: 98 mL/min/1.73m2 (result from lab).    No results for input(s): \"TROP\", \"TROPHS\", \"CK\" in the last 168 hours.    Recent Labs   Lab 25  1421 25  0612   PTP 13.0 13.0   INR 0.97 0.97                  Microbiology    No results found for this visit on 25.      Imaging: Reviewed in Epic.    Medications: Scheduled Medications[1]    Assessment & Plan:      #Intractable back pain  #Lumbar disc herniation  - Plan for L1-L2, L3-L4  discectomy on Monday  - IV steroids  - Pain control as needed  - Neurosurgery following      #Hyperlipidemia  - Statin     #Anxiety  #Depression  - Bupropion      Jarred Hernandez,     Supplementary Documentation:     Quality:  DVT Mechanical Prophylaxis:   SCDs,    DVT Pharmacologic Prophylaxis   Medication   None                Code Status: Full Code  Moody: No urinary catheter in place  Moody Duration (in days):   Central line:    NICOLA:     Discharge is dependent on: Clinical improvement  At this point Mr. Liang is expected to be discharge to: Home    The 21st Century Cures Act makes medical notes like these available to patients in the interest of transparency. Please be advised this is a medical document. Medical documents are intended to carry relevant information, facts as evident, and the clinical opinion of the practitioner. The medical note is intended as peer to peer communication and may appear blunt or direct. It is written in medical language and may contain abbreviations or verbiage that are unfamiliar.                         [1]    docusate sodium  100 mg Oral BID    buPROPion ER  150 mg Oral Daily    buPROPion ER  300 mg Oral Daily    propranolol  10 mg Oral Daily    rosuvastatin  10 mg Oral Nightly    traZODone   mg Oral Nightly    vortioxetine  20 mg Oral Daily    dexamethasone  6 mg Intravenous Q6H    pregabalin  75 mg Oral BID

## 2025-04-27 NOTE — PLAN OF CARE
Pain to lt leg mild at rest, more intense on ambulation. No c/o n/t to ble. VSS, encouraged use of IS and ankle exercises. Pt been ambulating frequently in halls and in room. Cont IV steroids, scheduled for surgery on Monday.

## 2025-04-28 ENCOUNTER — ANESTHESIA EVENT (OUTPATIENT)
Dept: MEDSURG UNIT | Facility: HOSPITAL | Age: 55
DRG: 520 | End: 2025-04-28
Payer: COMMERCIAL

## 2025-04-28 ENCOUNTER — ANESTHESIA (OUTPATIENT)
Dept: MEDSURG UNIT | Facility: HOSPITAL | Age: 55
DRG: 520 | End: 2025-04-28
Payer: COMMERCIAL

## 2025-04-28 ENCOUNTER — APPOINTMENT (OUTPATIENT)
Dept: GENERAL RADIOLOGY | Facility: HOSPITAL | Age: 55
DRG: 520 | End: 2025-04-28
Attending: NEUROLOGICAL SURGERY
Payer: COMMERCIAL

## 2025-04-28 PROCEDURE — 99232 SBSQ HOSP IP/OBS MODERATE 35: CPT | Performed by: STUDENT IN AN ORGANIZED HEALTH CARE EDUCATION/TRAINING PROGRAM

## 2025-04-28 NOTE — PLAN OF CARE
A/o x4. VSS, on RA. Tele in place; NSR. Patient reports only slight pain with movement. Denies n/t. Ambulating often in room and hallway. IV steroids per orders. Tolerating PO intake.Declined pain medication at this time. Voiding without difficulty. LB 4/27. Plan for OR tomorrow. POC updated with pt. All safety measures in place. Call light within reach instructed pt to call for help or assistance.

## 2025-04-28 NOTE — PROGRESS NOTES
[facility]   Neurosurgery Progress Note    Shin Liang Patient Status:  Inpatient    1970 MRN [id number]   Location [facility] 3SW-A Attending Jarred Hernandez,    Hosp Day # 3 PCP Hemal Martines MD     Chief Complaint:  Lumbar radiculopathy     Subjective:  No acute overnight events. Pt examined, reports ongoing left sided leg pain radiating to the groin and thigh, terminating at the knee. Better controlled with medications. He has been able to ambulate.     Objective/Physical Exam:    Vital Signs:  Blood pressure 127/76, pulse 68, temperature 97.6 °F (36.4 °C), temperature source Oral, resp. rate 16, weight 160 lb (72.6 kg), SpO2 97%.    Respiratory:  Respirations nonlabored    CV:  NSR on tele    General: NAD, Speech Fluent, Mood Appropriate    Neurologic: This patient is alert and orientated x 3.  Able to follow commands.  Face is symmetric. Sensation to light touch of BLE intact.     Lower extremity strength:      Iliospoas  Hamstrings  Quads  D-flexion  P-flexion EHL     Right 5 5 5 5 5 5     Left 5 5 5 5 5 5            Labs:       Imaging:  No new imaging available for review        Assessment:  55 y/o male with intractable back pain with left L1-2 and left L3-4 disc herniations     Plan:  OR today for left L1-2 and L3-4 far lateral microdiscectomy   Continue NPO   Preop labs reviewed   Continue pain control, decadron   Will resume PT/OT post op   Medical optimization per hospitalist   Verify informed consent       Patient seen and examined. Plan of care discussed with the patient who verbalized understanding and agreed to the plan. Will discuss with Dr. George.       Cherelle Beauchamp PA-C   [facility]  2025, 7:13 AM      A total of ----- minutes of visit time (exclusible of billable procedures) was administered.  > 50 % of time spent counseling/coordinating care     Is this a shared or split note between Advanced Practice Provider and  Physician? Yes    Dragon speech recognition software was used to prepare this note. If a word or phrase is confusing, it is likely due to a failure of recognition. Please contact me with any questions or clarifications.

## 2025-04-28 NOTE — ANESTHESIA PREPROCEDURE EVALUATION
PRE-OP EVALUATION    Patient Name: Shin Liang    Admit Diagnosis: Intractable low back pain [M54.59]    Pre-op Diagnosis: Lumbar radiculopathy [M54.16]  Lumbar disc herniation with radiculopathy [M51.16]    Left lumbar 1-lumbar 2 far lateral minimally invasive microdiscectomy; left lumbar 3-lumbar 4 far lateral minimally invasive microdiscectomy    Anesthesia Procedure: Left lumbar 1-lumbar 2 far lateral minimally invasive microdiscectomy; left lumbar 3-lumbar 4 far lateral minimally invasive microdiscectomy (Left)    Surgeons and Role:     * Diogenes George MD - Primary    Pre-op vitals reviewed.  Temp: 98.6 °F (37 °C)  Pulse: 64  Resp: 20  BP: 155/89  SpO2: 98 %  Body mass index is 22.63 kg/m².    Current medications reviewed.  Hospital Medications:  Current Medications[1]    Outpatient Medications:   Prescriptions Prior to Admission[2]    Allergies: Patient has no known allergies.      Anesthesia Evaluation    Patient summary reviewed.    Anesthetic Complications  (-) history of anesthetic complications         GI/Hepatic/Renal    Negative GI/hepatic/renal ROS.                             Cardiovascular    Negative cardiovascular ROS.                                                   Endo/Other    Negative endo/other ROS.                              Pulmonary      (-) asthma  (-) COPD            (+) sleep apnea       Neuro/Psych      (+) depression    (-) CVA       (+) neuromuscular disease                     Past Surgical History[3]  Social Hx on file[4]  History   Drug Use Unknown     Available pre-op labs reviewed.  Lab Results   Component Value Date    WBC 17.3 (H) 04/27/2025    RBC 4.80 04/27/2025    HGB 14.5 04/27/2025    HCT 42.9 04/27/2025    MCV 89.4 04/27/2025    MCH 30.2 04/27/2025    MCHC 33.8 04/27/2025    RDW 13.8 04/27/2025    .0 (H) 04/27/2025     Lab Results   Component Value Date     04/27/2025    K 4.4 04/27/2025     04/27/2025    CO2 26.0 04/27/2025     BUN 17 04/27/2025    CREATSERUM 0.94 04/27/2025     (H) 04/27/2025    CA 9.1 04/27/2025     Lab Results   Component Value Date    INR 0.97 04/27/2025         Airway      Mallampati: II  Mouth opening: >3 FB    Neck ROM: full Cardiovascular    Cardiovascular exam normal.         Dental    Dentition appears grossly intact         Pulmonary    Pulmonary exam normal.                 Other findings              ASA: 2   Plan: general  NPO status verified and Patient does not meet NPO guidelines.          Plan/risks discussed with: patient  Use of blood product(s) discussed with: patient    Consented to blood products.          Present on Admission:  **None**             [1]    docusate sodium (Colace) cap 100 mg  100 mg Oral BID    magnesium hydroxide (Milk of Magnesia) 400 MG/5ML oral suspension 30 mL  30 mL Oral Daily PRN    [COMPLETED] morphINE PF 4 MG/ML injection 4 mg  4 mg Intravenous Once    [COMPLETED] ketorolac (Toradol) 15 MG/ML injection 15 mg  15 mg Intravenous Once    [COMPLETED] morphINE PF 4 MG/ML injection 4 mg  4 mg Intravenous Once    acetaminophen (Tylenol Extra Strength) tab 500 mg  500 mg Oral Q4H PRN    melatonin tab 3 mg  3 mg Oral Nightly PRN    guaiFENesin ER (Mucinex) 12 hr tab 600 mg  600 mg Oral BID PRN    benzonatate (Tessalon) cap 200 mg  200 mg Oral TID PRN    glycerin-hypromellose- (Artificial Tears) 0.2-0.2-1 % ophthalmic solution 1 drop  1 drop Both Eyes QID PRN    sodium chloride (Saline Mist) 0.65 % nasal solution 1 spray  1 spray Each Nare Q3H PRN    morphINE PF 2 MG/ML injection 1 mg  1 mg Intravenous Q2H PRN    Or    morphINE PF 2 MG/ML injection 2 mg  2 mg Intravenous Q2H PRN    Or    morphINE PF 4 MG/ML injection 4 mg  4 mg Intravenous Q2H PRN    acetaminophen (Tylenol) tab 650 mg  650 mg Oral Q4H PRN    Or    HYDROcodone-acetaminophen (Norco) 5-325 MG per tab 1 tablet  1 tablet Oral Q4H PRN    Or    HYDROcodone-acetaminophen (Norco) 5-325 MG per tab 2 tablet  2  tablet Oral Q4H PRN    ondansetron (Zofran) 4 MG/2ML injection 4 mg  4 mg Intravenous Q6H PRN    prochlorperazine (Compazine) 10 MG/2ML injection 5 mg  5 mg Intravenous Q8H PRN    polyethylene glycol (PEG 3350) (Miralax) 17 g oral packet 17 g  17 g Oral Daily PRN    sennosides (Senokot) tab 17.2 mg  17.2 mg Oral Nightly PRN    bisacodyl (Dulcolax) 10 MG rectal suppository 10 mg  10 mg Rectal Daily PRN    fleet enema (Fleet) rectal enema 133 mL  1 enema Rectal Once PRN    buPROPion ER (Wellbutrin XL) 24 hr tab 150 mg  150 mg Oral Daily    buPROPion ER (Wellbutrin XL) 24 hr tab 300 mg  300 mg Oral Daily    cloNIDine (Catapres) tab 0.1 mg  0.1 mg Oral Nightly PRN    propranolol (Inderal) tab 10 mg  10 mg Oral Daily    methocarbamol (Robaxin) tab 500 mg  500 mg Oral Q6H PRN    rosuvastatin (Crestor) tab 10 mg  10 mg Oral Nightly    traMADol (Ultram) tab 50 mg  50 mg Oral Q6H PRN    traZODone (Desyrel) tab  mg   mg Oral Nightly    vortioxetine (Trintellix) tab 20 mg  20 mg Oral Daily    dexamethasone (Decadron) 4 MG/ML injection 6 mg  6 mg Intravenous Q6H    pregabalin (Lyrica) cap 75 mg  75 mg Oral BID   [2]   Medications Prior to Admission   Medication Sig Dispense Refill Last Dose/Taking    traZODone 50 MG Oral Tab Take 1-2 tablets ( mg total) by mouth nightly.   4/22/2025 Bedtime    propranolol 10 MG Oral Tab Take 1 tablet (10 mg total) by mouth daily. Take 30-60 minutes prior to performance/presentation   4/22/2025 Morning    TRINTELLIX 20 MG Oral Tab Take 1 tablet (20 mg total) by mouth daily.   4/22/2025 Morning    traMADol 50 MG Oral Tab Take 1 tablet (50 mg total) by mouth every 6 (six) hours as needed for Pain. 30 tablet 0 4/22/2025    methocarbamol 500 MG Oral Tab Take 1 tablet (500 mg total) by mouth 4 (four) times daily. As needed for muscle spasm 15 tablet 0 4/22/2025    ROSUVASTATIN 10 MG Oral Tab TAKE 1 TABLET NIGHTLY 90 tablet 0 Past Month    buPROPion  MG Oral Tablet 24 Hr Take 1  tablet (300 mg total) by mouth daily. 90 tablet 0 Past Week    cloNIDine 0.1 MG Oral Tab Take 1 tablet (0.1 mg total) by mouth at bedtime. (Patient taking differently: Take 1 tablet (0.1 mg total) by mouth nightly as needed (sleep).)   Past Month    DYANAVEL XR 20 MG Oral Tablet Chewable Extended Release Take 1 tablet by mouth every morning. (Patient taking differently: Take 1 tablet by mouth daily as needed (Monday through Friday for focus).)   Past Week    buPROPion  MG Oral Tablet 24 Hr Take 1 tablet (150 mg total) by mouth daily. 90 tablet 0 Past Week    Eletriptan Hydrobromide 40 MG Oral Tab TAKE 1 TABLET AT ONSET. REPEAT ONCE AFTER 2 HOURS IF NEEDED, MAX 2 TABLETS PER 24 HOURS 18 tablet 0 More than a month    DYANAVEL XR 10 MG Oral Tablet Chewable Extended Release Take 1 tablet by mouth daily. 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

## 2025-04-28 NOTE — PROGRESS NOTES
[facility]   part of [facility]     Hospitalist Progress Note     Shin Liang Patient Status:  Observation    1970 MRN [id number]   Location [facility] 3SW-A Attending Jarred Hernandez,    Hosp Day # 3 PCP Hemal Martines MD     Chief Complaint: Back pain    Subjective:     Patient feels well and has no complaints    Objective:    Review of Systems:   A comprehensive review of systems was completed; pertinent positive and negatives stated in subjective.    Vital signs:  Temp:  [97.6 °F (36.4 °C)-98.6 °F (37 °C)] 98.6 °F (37 °C)  Pulse:  [65-83] 65  Resp:  [16-20] 20  BP: (127-150)/(74-90) 137/86  SpO2:  [95 %-98 %] 97 %    Physical Exam:    General: No acute distress  Respiratory: No wheezes, no rhonchi  Cardiovascular: S1, S2, regular rate and rhythm  Abdomen: Soft, Non-tender, non-distended, positive bowel sounds  Neuro: No new focal deficits.   Extremities: No edema      Diagnostic Data:    Labs:  Recent Labs   Lab 25  1421 25  0612 25  0612   WBC 10.9 12.1* 17.3*   HGB 16.9 16.2 14.5   MCV 89.5 91.0 89.4   .0* 513.0* 466.0*   INR 0.97  --  0.97       Recent Labs   Lab 25  1421 25  0612 25  0612   GLU 93 142* 118*   BUN 19 18 17   CREATSERUM 0.95 0.87 0.94   CA 9.4 9.5 9.1   ALB 4.6 4.5  --     138 136   K 3.7 4.7 4.4    105 103   CO2 28.0 22.0 26.0   ALKPHO 56 56  --    AST 26 17  --    ALT 69* 44  --    BILT 1.1 0.9  --    TP 7.0 6.8  --        Estimated Glomerular Filtration Rate: 98 mL/min/1.73m2 (result from lab).    No results for input(s): \"TROP\", \"TROPHS\", \"CK\" in the last 168 hours.    Recent Labs   Lab 25  1421 25  0612   PTP 13.0 13.0   INR 0.97 0.97                  Microbiology    No results found for this visit on 25.      Imaging: Reviewed in Epic.    Medications: Scheduled Medications[1]    Assessment & Plan:      #Intractable back pain  #Lumbar disc herniation  - Plan for L1-L2, L3-L4  discectomy today  - IV steroids  - Pain control as needed  - Neurosurgery following      #Hyperlipidemia  - Statin     #Anxiety  #Depression  - Bupropion      Jarred Hernandez DO    Supplementary Documentation:     Quality:  DVT Mechanical Prophylaxis:   SCDs,    DVT Pharmacologic Prophylaxis   Medication   None                Code Status: Full Code  Moody: No urinary catheter in place  Moody Duration (in days):   Central line:    NICOLA:     Discharge is dependent on: Clinical improvement  At this point Mr. Liang is expected to be discharge to: Home    The 21st Century Cures Act makes medical notes like these available to patients in the interest of transparency. Please be advised this is a medical document. Medical documents are intended to carry relevant information, facts as evident, and the clinical opinion of the practitioner. The medical note is intended as peer to peer communication and may appear blunt or direct. It is written in medical language and may contain abbreviations or verbiage that are unfamiliar.                         [1]    docusate sodium  100 mg Oral BID    buPROPion ER  150 mg Oral Daily    buPROPion ER  300 mg Oral Daily    propranolol  10 mg Oral Daily    rosuvastatin  10 mg Oral Nightly    traZODone   mg Oral Nightly    vortioxetine  20 mg Oral Daily    dexamethasone  6 mg Intravenous Q6H    pregabalin  75 mg Oral BID

## 2025-04-28 NOTE — PLAN OF CARE
Pt A&Ox4. VSS on RA. . Telemetry monitoring, NSR. NPO for left L1-2 and L3-4 far lateral microdiscectomy with Dr. George this afternoon. Consent signed and in pt chart. Denies pain at this time. Decadron q6h. Up ad eleuterio. Call light within reach. Will continue to monitor.
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
## 2025-04-29 ENCOUNTER — ANESTHESIA (OUTPATIENT)
Dept: SURGERY | Facility: HOSPITAL | Age: 55
DRG: 520 | End: 2025-04-29
Payer: COMMERCIAL

## 2025-04-29 ENCOUNTER — APPOINTMENT (OUTPATIENT)
Dept: GENERAL RADIOLOGY | Facility: HOSPITAL | Age: 55
DRG: 520 | End: 2025-04-29
Attending: NEUROLOGICAL SURGERY
Payer: COMMERCIAL

## 2025-04-29 ENCOUNTER — ANESTHESIA EVENT (OUTPATIENT)
Dept: SURGERY | Facility: HOSPITAL | Age: 55
DRG: 520 | End: 2025-04-29
Payer: COMMERCIAL

## 2025-04-29 VITALS
RESPIRATION RATE: 18 BRPM | BODY MASS INDEX: 23 KG/M2 | SYSTOLIC BLOOD PRESSURE: 131 MMHG | WEIGHT: 160 LBS | OXYGEN SATURATION: 97 % | HEART RATE: 89 BPM | TEMPERATURE: 98 F | DIASTOLIC BLOOD PRESSURE: 71 MMHG

## 2025-04-29 PROCEDURE — 63057 DECOMPRESS SPINE CORD ADD-ON: CPT | Performed by: NEUROLOGICAL SURGERY

## 2025-04-29 PROCEDURE — 63056 DECOMPRESS SPINAL CORD LMBR: CPT | Performed by: NEUROLOGICAL SURGERY

## 2025-04-29 PROCEDURE — 76000 FLUOROSCOPY <1 HR PHYS/QHP: CPT | Performed by: NEUROLOGICAL SURGERY

## 2025-04-29 PROCEDURE — 63057 DECOMPRESS SPINE CORD ADD-ON: CPT

## 2025-04-29 PROCEDURE — 01NB0ZZ RELEASE LUMBAR NERVE, OPEN APPROACH: ICD-10-PCS | Performed by: NEUROLOGICAL SURGERY

## 2025-04-29 PROCEDURE — 99232 SBSQ HOSP IP/OBS MODERATE 35: CPT | Performed by: STUDENT IN AN ORGANIZED HEALTH CARE EDUCATION/TRAINING PROGRAM

## 2025-04-29 PROCEDURE — 0SB20ZZ EXCISION OF LUMBAR VERTEBRAL DISC, OPEN APPROACH: ICD-10-PCS | Performed by: NEUROLOGICAL SURGERY

## 2025-04-29 PROCEDURE — 63056 DECOMPRESS SPINAL CORD LMBR: CPT

## 2025-04-29 RX ORDER — HYDROMORPHONE HYDROCHLORIDE 1 MG/ML
INJECTION, SOLUTION INTRAMUSCULAR; INTRAVENOUS; SUBCUTANEOUS
Status: COMPLETED
Start: 2025-04-29 | End: 2025-04-29

## 2025-04-29 RX ORDER — ONDANSETRON 2 MG/ML
4 INJECTION INTRAMUSCULAR; INTRAVENOUS EVERY 6 HOURS PRN
Status: DISCONTINUED | OUTPATIENT
Start: 2025-04-29 | End: 2025-04-30

## 2025-04-29 RX ORDER — HYDROMORPHONE HYDROCHLORIDE 1 MG/ML
0.4 INJECTION, SOLUTION INTRAMUSCULAR; INTRAVENOUS; SUBCUTANEOUS EVERY 5 MIN PRN
Status: DISCONTINUED | OUTPATIENT
Start: 2025-04-29 | End: 2025-04-29 | Stop reason: HOSPADM

## 2025-04-29 RX ORDER — DEXAMETHASONE SODIUM PHOSPHATE 4 MG/ML
8 VIAL (ML) INJECTION EVERY 6 HOURS
Status: DISCONTINUED | OUTPATIENT
Start: 2025-04-29 | End: 2025-04-30

## 2025-04-29 RX ORDER — DEXAMETHASONE 1 MG
TABLET ORAL
Qty: 53 TABLET | Refills: 0 | Status: SHIPPED | OUTPATIENT
Start: 2025-04-29 | End: 2025-05-12

## 2025-04-29 RX ORDER — GLYCOPYRROLATE 0.2 MG/ML
INJECTION, SOLUTION INTRAMUSCULAR; INTRAVENOUS AS NEEDED
Status: DISCONTINUED | OUTPATIENT
Start: 2025-04-29 | End: 2025-04-29 | Stop reason: SURG

## 2025-04-29 RX ORDER — METHOCARBAMOL 100 MG/ML
500 INJECTION, SOLUTION INTRAMUSCULAR; INTRAVENOUS EVERY 12 HOURS
Status: DISCONTINUED | OUTPATIENT
Start: 2025-04-29 | End: 2025-04-30

## 2025-04-29 RX ORDER — SODIUM CHLORIDE, SODIUM LACTATE, POTASSIUM CHLORIDE, CALCIUM CHLORIDE 600; 310; 30; 20 MG/100ML; MG/100ML; MG/100ML; MG/100ML
INJECTION, SOLUTION INTRAVENOUS CONTINUOUS
Status: DISCONTINUED | OUTPATIENT
Start: 2025-04-29 | End: 2025-04-29 | Stop reason: HOSPADM

## 2025-04-29 RX ORDER — NEOSTIGMINE METHYLSULFATE 1 MG/ML
INJECTION INTRAVENOUS AS NEEDED
Status: DISCONTINUED | OUTPATIENT
Start: 2025-04-29 | End: 2025-04-29 | Stop reason: SURG

## 2025-04-29 RX ORDER — SODIUM CHLORIDE, SODIUM LACTATE, POTASSIUM CHLORIDE, CALCIUM CHLORIDE 600; 310; 30; 20 MG/100ML; MG/100ML; MG/100ML; MG/100ML
INJECTION, SOLUTION INTRAVENOUS CONTINUOUS PRN
Status: DISCONTINUED | OUTPATIENT
Start: 2025-04-29 | End: 2025-04-29 | Stop reason: SURG

## 2025-04-29 RX ORDER — METHOCARBAMOL 500 MG/1
500 TABLET, FILM COATED ORAL 3 TIMES DAILY
Qty: 60 TABLET | Refills: 0 | Status: SHIPPED | OUTPATIENT
Start: 2025-04-29

## 2025-04-29 RX ORDER — LIDOCAINE HYDROCHLORIDE 10 MG/ML
INJECTION, SOLUTION EPIDURAL; INFILTRATION; INTRACAUDAL; PERINEURAL AS NEEDED
Status: DISCONTINUED | OUTPATIENT
Start: 2025-04-29 | End: 2025-04-29 | Stop reason: SURG

## 2025-04-29 RX ORDER — HYDROCODONE BITARTRATE AND ACETAMINOPHEN 5; 325 MG/1; MG/1
1 TABLET ORAL ONCE AS NEEDED
Status: DISCONTINUED | OUTPATIENT
Start: 2025-04-29 | End: 2025-04-29 | Stop reason: HOSPADM

## 2025-04-29 RX ORDER — ONDANSETRON 2 MG/ML
4 INJECTION INTRAMUSCULAR; INTRAVENOUS EVERY 6 HOURS PRN
Status: DISCONTINUED | OUTPATIENT
Start: 2025-04-29 | End: 2025-04-29 | Stop reason: HOSPADM

## 2025-04-29 RX ORDER — DIPHENHYDRAMINE HCL 25 MG
25 CAPSULE ORAL EVERY 4 HOURS PRN
Status: DISCONTINUED | OUTPATIENT
Start: 2025-04-29 | End: 2025-04-30

## 2025-04-29 RX ORDER — DOCUSATE SODIUM 100 MG/1
100 CAPSULE, LIQUID FILLED ORAL 2 TIMES DAILY
Status: DISCONTINUED | OUTPATIENT
Start: 2025-04-29 | End: 2025-04-30

## 2025-04-29 RX ORDER — SENNOSIDES 8.6 MG
17.2 TABLET ORAL NIGHTLY
Status: DISCONTINUED | OUTPATIENT
Start: 2025-04-29 | End: 2025-04-30

## 2025-04-29 RX ORDER — NALOXONE HYDROCHLORIDE 0.4 MG/ML
0.08 INJECTION, SOLUTION INTRAMUSCULAR; INTRAVENOUS; SUBCUTANEOUS AS NEEDED
Status: DISCONTINUED | OUTPATIENT
Start: 2025-04-29 | End: 2025-04-29 | Stop reason: HOSPADM

## 2025-04-29 RX ORDER — HYDROCODONE BITARTRATE AND ACETAMINOPHEN 5; 325 MG/1; MG/1
2 TABLET ORAL ONCE AS NEEDED
Status: DISCONTINUED | OUTPATIENT
Start: 2025-04-29 | End: 2025-04-29 | Stop reason: HOSPADM

## 2025-04-29 RX ORDER — DIAZEPAM 10 MG/2ML
5 INJECTION, SOLUTION INTRAMUSCULAR; INTRAVENOUS EVERY 6 HOURS PRN
Status: COMPLETED | OUTPATIENT
Start: 2025-04-29 | End: 2025-04-29

## 2025-04-29 RX ORDER — HYDROMORPHONE HYDROCHLORIDE 1 MG/ML
0.6 INJECTION, SOLUTION INTRAMUSCULAR; INTRAVENOUS; SUBCUTANEOUS EVERY 5 MIN PRN
Status: DISCONTINUED | OUTPATIENT
Start: 2025-04-29 | End: 2025-04-29 | Stop reason: HOSPADM

## 2025-04-29 RX ORDER — DIAZEPAM 10 MG/2ML
INJECTION, SOLUTION INTRAMUSCULAR; INTRAVENOUS
Status: COMPLETED
Start: 2025-04-29 | End: 2025-04-29

## 2025-04-29 RX ORDER — HYDROMORPHONE HYDROCHLORIDE 1 MG/ML
0.4 INJECTION, SOLUTION INTRAMUSCULAR; INTRAVENOUS; SUBCUTANEOUS EVERY 2 HOUR PRN
Status: DISCONTINUED | OUTPATIENT
Start: 2025-04-29 | End: 2025-04-30

## 2025-04-29 RX ORDER — ACETAMINOPHEN 10 MG/ML
INJECTION, SOLUTION INTRAVENOUS AS NEEDED
Status: DISCONTINUED | OUTPATIENT
Start: 2025-04-29 | End: 2025-04-29 | Stop reason: SURG

## 2025-04-29 RX ORDER — MIDAZOLAM HYDROCHLORIDE 1 MG/ML
INJECTION INTRAMUSCULAR; INTRAVENOUS AS NEEDED
Status: DISCONTINUED | OUTPATIENT
Start: 2025-04-29 | End: 2025-04-29 | Stop reason: SURG

## 2025-04-29 RX ORDER — ACETAMINOPHEN 500 MG
500 TABLET ORAL EVERY 6 HOURS
Status: DISCONTINUED | OUTPATIENT
Start: 2025-04-29 | End: 2025-04-30

## 2025-04-29 RX ORDER — ACETAMINOPHEN 500 MG
1000 TABLET ORAL EVERY 4 HOURS PRN
Qty: 120 TABLET | Refills: 0 | Status: SHIPPED | OUTPATIENT
Start: 2025-04-29

## 2025-04-29 RX ORDER — KETAMINE HYDROCHLORIDE 50 MG/ML
INJECTION, SOLUTION INTRAMUSCULAR; INTRAVENOUS AS NEEDED
Status: DISCONTINUED | OUTPATIENT
Start: 2025-04-29 | End: 2025-04-29 | Stop reason: SURG

## 2025-04-29 RX ORDER — METHOCARBAMOL 500 MG/1
500 TABLET, FILM COATED ORAL 3 TIMES DAILY
Status: DISCONTINUED | OUTPATIENT
Start: 2025-04-30 | End: 2025-04-30

## 2025-04-29 RX ORDER — CEFAZOLIN SODIUM 1 G/3ML
INJECTION, POWDER, FOR SOLUTION INTRAMUSCULAR; INTRAVENOUS AS NEEDED
Status: DISCONTINUED | OUTPATIENT
Start: 2025-04-29 | End: 2025-04-29 | Stop reason: SURG

## 2025-04-29 RX ORDER — LIDOCAINE HYDROCHLORIDE AND EPINEPHRINE 10; 10 MG/ML; UG/ML
INJECTION, SOLUTION INFILTRATION; PERINEURAL AS NEEDED
Status: DISCONTINUED | OUTPATIENT
Start: 2025-04-29 | End: 2025-04-29 | Stop reason: HOSPADM

## 2025-04-29 RX ORDER — OXYCODONE HYDROCHLORIDE 5 MG/1
5 TABLET ORAL EVERY 4 HOURS PRN
Status: DISCONTINUED | OUTPATIENT
Start: 2025-04-29 | End: 2025-04-30

## 2025-04-29 RX ORDER — SODIUM PHOSPHATE, DIBASIC AND SODIUM PHOSPHATE, MONOBASIC 7; 19 G/230ML; G/230ML
1 ENEMA RECTAL ONCE AS NEEDED
Status: DISCONTINUED | OUTPATIENT
Start: 2025-04-29 | End: 2025-04-30

## 2025-04-29 RX ORDER — DIPHENHYDRAMINE HYDROCHLORIDE 50 MG/ML
25 INJECTION, SOLUTION INTRAMUSCULAR; INTRAVENOUS EVERY 4 HOURS PRN
Status: DISCONTINUED | OUTPATIENT
Start: 2025-04-29 | End: 2025-04-30

## 2025-04-29 RX ORDER — OXYCODONE HYDROCHLORIDE 10 MG/1
10 TABLET ORAL EVERY 4 HOURS PRN
Status: DISCONTINUED | OUTPATIENT
Start: 2025-04-29 | End: 2025-04-30

## 2025-04-29 RX ORDER — OXYCODONE HYDROCHLORIDE 5 MG/1
5 TABLET ORAL EVERY 4 HOURS PRN
Qty: 30 TABLET | Refills: 0 | Status: SHIPPED | OUTPATIENT
Start: 2025-04-29

## 2025-04-29 RX ORDER — ERYTHROMYCIN 5 MG/G
1 OINTMENT OPHTHALMIC EVERY 8 HOURS SCHEDULED
Status: DISCONTINUED | OUTPATIENT
Start: 2025-04-29 | End: 2025-04-30

## 2025-04-29 RX ORDER — ROCURONIUM BROMIDE 10 MG/ML
INJECTION, SOLUTION INTRAVENOUS AS NEEDED
Status: DISCONTINUED | OUTPATIENT
Start: 2025-04-29 | End: 2025-04-29 | Stop reason: SURG

## 2025-04-29 RX ORDER — ENOXAPARIN SODIUM 100 MG/ML
40 INJECTION SUBCUTANEOUS DAILY
Status: DISCONTINUED | OUTPATIENT
Start: 2025-04-30 | End: 2025-04-30

## 2025-04-29 RX ORDER — ACETAMINOPHEN 500 MG
1000 TABLET ORAL ONCE AS NEEDED
Status: DISCONTINUED | OUTPATIENT
Start: 2025-04-29 | End: 2025-04-29 | Stop reason: HOSPADM

## 2025-04-29 RX ORDER — BISACODYL 10 MG
10 SUPPOSITORY, RECTAL RECTAL
Status: DISCONTINUED | OUTPATIENT
Start: 2025-04-29 | End: 2025-04-30

## 2025-04-29 RX ORDER — PROCHLORPERAZINE EDISYLATE 5 MG/ML
5 INJECTION INTRAMUSCULAR; INTRAVENOUS EVERY 8 HOURS PRN
Status: DISCONTINUED | OUTPATIENT
Start: 2025-04-29 | End: 2025-04-29 | Stop reason: HOSPADM

## 2025-04-29 RX ORDER — PROCHLORPERAZINE EDISYLATE 5 MG/ML
5 INJECTION INTRAMUSCULAR; INTRAVENOUS EVERY 8 HOURS PRN
Status: DISCONTINUED | OUTPATIENT
Start: 2025-04-29 | End: 2025-04-30

## 2025-04-29 RX ORDER — HYDROMORPHONE HYDROCHLORIDE 1 MG/ML
0.8 INJECTION, SOLUTION INTRAMUSCULAR; INTRAVENOUS; SUBCUTANEOUS EVERY 2 HOUR PRN
Status: DISCONTINUED | OUTPATIENT
Start: 2025-04-29 | End: 2025-04-30

## 2025-04-29 RX ORDER — ONDANSETRON 2 MG/ML
INJECTION INTRAMUSCULAR; INTRAVENOUS AS NEEDED
Status: DISCONTINUED | OUTPATIENT
Start: 2025-04-29 | End: 2025-04-29 | Stop reason: SURG

## 2025-04-29 RX ORDER — POLYETHYLENE GLYCOL 3350 17 G/17G
17 POWDER, FOR SOLUTION ORAL DAILY PRN
Status: DISCONTINUED | OUTPATIENT
Start: 2025-04-29 | End: 2025-04-30

## 2025-04-29 RX ORDER — HYDROMORPHONE HYDROCHLORIDE 1 MG/ML
0.2 INJECTION, SOLUTION INTRAMUSCULAR; INTRAVENOUS; SUBCUTANEOUS EVERY 5 MIN PRN
Status: DISCONTINUED | OUTPATIENT
Start: 2025-04-29 | End: 2025-04-29 | Stop reason: HOSPADM

## 2025-04-29 RX ORDER — KETOROLAC TROMETHAMINE 30 MG/ML
INJECTION, SOLUTION INTRAMUSCULAR; INTRAVENOUS AS NEEDED
Status: DISCONTINUED | OUTPATIENT
Start: 2025-04-29 | End: 2025-04-29 | Stop reason: SURG

## 2025-04-29 RX ADMIN — LIDOCAINE HYDROCHLORIDE 50 MG: 10 INJECTION, SOLUTION EPIDURAL; INFILTRATION; INTRACAUDAL; PERINEURAL at 11:05:00

## 2025-04-29 RX ADMIN — ACETAMINOPHEN 1000 MG: 10 INJECTION, SOLUTION INTRAVENOUS at 13:50:00

## 2025-04-29 RX ADMIN — CEFAZOLIN SODIUM 2 G: 1 INJECTION, POWDER, FOR SOLUTION INTRAMUSCULAR; INTRAVENOUS at 11:17:00

## 2025-04-29 RX ADMIN — KETAMINE HYDROCHLORIDE 25 MG: 50 INJECTION, SOLUTION INTRAMUSCULAR; INTRAVENOUS at 11:43:00

## 2025-04-29 RX ADMIN — KETOROLAC TROMETHAMINE 30 MG: 30 INJECTION, SOLUTION INTRAMUSCULAR; INTRAVENOUS at 13:58:00

## 2025-04-29 RX ADMIN — ONDANSETRON 4 MG: 2 INJECTION INTRAMUSCULAR; INTRAVENOUS at 13:58:00

## 2025-04-29 RX ADMIN — SODIUM CHLORIDE, SODIUM LACTATE, POTASSIUM CHLORIDE, CALCIUM CHLORIDE: 600; 310; 30; 20 INJECTION, SOLUTION INTRAVENOUS at 11:03:00

## 2025-04-29 RX ADMIN — MIDAZOLAM HYDROCHLORIDE 2 MG: 1 INJECTION INTRAMUSCULAR; INTRAVENOUS at 11:04:00

## 2025-04-29 RX ADMIN — ROCURONIUM BROMIDE 40 MG: 10 INJECTION, SOLUTION INTRAVENOUS at 11:05:00

## 2025-04-29 RX ADMIN — GLYCOPYRROLATE 0.4 MG: 0.2 INJECTION, SOLUTION INTRAMUSCULAR; INTRAVENOUS at 13:58:00

## 2025-04-29 RX ADMIN — NEOSTIGMINE METHYLSULFATE 2 MG: 1 INJECTION INTRAVENOUS at 13:58:00

## 2025-04-29 NOTE — PROGRESS NOTES
[facility]   Neurosurgery Progress Note    Shin Liang Patient Status:  Inpatient    1970 MRN [id number]   Location [facility] 3SW-A Attending Jarred Hernandez, DO   Hosp Day # 4 PCP Hemal Martines MD     Chief Complaint:  Lumbar radiculopathy     Subjective:  No acute overnight events. Pt examined, continues to endorse left leg pain. No new neurologic complaints. Has been NPO since midnight.     Objective/Physical Exam:    Vital Signs:  Blood pressure 108/69, pulse 62, temperature 97.6 °F (36.4 °C), temperature source Oral, resp. rate 18, weight 160 lb (72.6 kg), SpO2 96%.    Respiratory:  Respirations nonlabored    CV:  NSR on tele    General: NAD, Speech Fluent, Mood Appropriate    Neurologic: This patient is bright and awake, answers all questions appropriately.  Able to follow commands.  Face is symmetric. Sensation to light touch of BLE intact.     Lower extremity strength:      Iliospoas  Hamstrings  Quads  D-flexion  P-flexion     Right 5 5 5 5 5     Left 5 5 5 5 5            Labs:       Imaging:  No new imaging available for review        Assessment:  55 y/o male with intractable back pain with left L1-2 and left L3-4 disc herniations     Plan:  OR today for left L1-2 and L3-4 far lateral microdiscectomy   Continue NPO   Preop labs reviewed   Continue pain control, decadron   Will resume PT/OT post op   Medical optimization per hospitalist   Verify informed consent       Patient seen and examined. Plan of care discussed with the patient who verbalized understanding and agreed to the plan. Will discuss with Dr. George.       Cherelle Beauchamp PA-C   [facility]  2025    A total of 15 minutes of visit time (exclusible of billable procedures) was administered.  > 50 % of time spent counseling/coordinating care     Is this a shared or split note between Advanced Practice Provider and Physician? Yes    Dragon speech recognition software was used to  prepare this note. If a word or phrase is confusing, it is likely due to a failure of recognition. Please contact me with any questions or clarifications.

## 2025-04-29 NOTE — PROGRESS NOTES
[facility]   part of [facility]     Hospitalist Progress Note     Shin Liang Patient Status:  Observation    1970 MRN [id number]   Location [facility] 3SW-A Attending Jarred Hernandez, DO   Hosp Day # 4 PCP Hemal Martines MD     Chief Complaint: Back pain    Subjective:     Patient feels well and has no complaints    Objective:    Review of Systems:   A comprehensive review of systems was completed; pertinent positive and negatives stated in subjective.    Vital signs:  Temp:  [97.5 °F (36.4 °C)-98.6 °F (37 °C)] 97.6 °F (36.4 °C)  Pulse:  [62-79] 62  Resp:  [18-20] 18  BP: (108-155)/(64-89) 108/69  SpO2:  [93 %-98 %] 96 %    Physical Exam:    General: No acute distress  Respiratory: No wheezes, no rhonchi  Cardiovascular: S1, S2, regular rate and rhythm  Abdomen: Soft, Non-tender, non-distended, positive bowel sounds  Neuro: No new focal deficits.   Extremities: No edema      Diagnostic Data:    Labs:  Recent Labs   Lab 25  1421 25  0612 25  0612   WBC 10.9 12.1* 17.3*   HGB 16.9 16.2 14.5   MCV 89.5 91.0 89.4   .0* 513.0* 466.0*   INR 0.97  --  0.97       Recent Labs   Lab 25  1421 25  0612 25  0612   GLU 93 142* 118*   BUN 19 18 17   CREATSERUM 0.95 0.87 0.94   CA 9.4 9.5 9.1   ALB 4.6 4.5  --     138 136   K 3.7 4.7 4.4    105 103   CO2 28.0 22.0 26.0   ALKPHO 56 56  --    AST 26 17  --    ALT 69* 44  --    BILT 1.1 0.9  --    TP 7.0 6.8  --        Estimated Glomerular Filtration Rate: 98 mL/min/1.73m2 (result from lab).    No results for input(s): \"TROP\", \"TROPHS\", \"CK\" in the last 168 hours.    Recent Labs   Lab 25  1421 25  0612   PTP 13.0 13.0   INR 0.97 0.97                  Microbiology    No results found for this visit on 25.      Imaging: Reviewed in Epic.    Medications: Scheduled Medications[1]    Assessment & Plan:      #Intractable back pain  #Lumbar disc herniation  - Plan for L1-L2, L3-L4  discectomy today  - IV steroids  - Pain control as needed  - Neurosurgery following      #Hyperlipidemia  - Statin     #Anxiety  #Depression  - Bupropion      Jarred Hernandez DO    Supplementary Documentation:     Quality:  DVT Mechanical Prophylaxis:   SCDs,    DVT Pharmacologic Prophylaxis   Medication   None                Code Status: Full Code  Moody: No urinary catheter in place  Moody Duration (in days):   Central line:    NICOLA:     Discharge is dependent on: Clinical improvement  At this point Mr. Liang is expected to be discharge to: Home    The 21st Century Cures Act makes medical notes like these available to patients in the interest of transparency. Please be advised this is a medical document. Medical documents are intended to carry relevant information, facts as evident, and the clinical opinion of the practitioner. The medical note is intended as peer to peer communication and may appear blunt or direct. It is written in medical language and may contain abbreviations or verbiage that are unfamiliar.                         [1]    docusate sodium  100 mg Oral BID    buPROPion ER  150 mg Oral Daily    buPROPion ER  300 mg Oral Daily    propranolol  10 mg Oral Daily    rosuvastatin  10 mg Oral Nightly    traZODone   mg Oral Nightly    vortioxetine  20 mg Oral Daily    dexamethasone  6 mg Intravenous Q6H    pregabalin  75 mg Oral BID

## 2025-04-29 NOTE — DISCHARGE SUMMARY
[place] HOSPITALIST  DISCHARGE SUMMARY     Shin Liang Patient Status:  Inpatient    1970 MRN [id number]   Location [facility] 3SW-A Attending Dayne Hernandez, *   Hosp Day # 4 PCP Hemal Martines MD     Date of Admission: 2025  Date of Discharge:   2025    Discharge Disposition: Home or Self Care    Discharge Diagnosis:  #Intractable back pain  #Lumbar disc herniation  - Plan for L1-L2, L3-L4 discectomy today  - IV steroids  - Pain control as needed  - Neurosurgery following      #Hyperlipidemia  - Statin     #Anxiety  #Depression  - Bupropion    History of Present Illness: Shin Liang is a 54 year old male with past medical history of hyperlipidemia, anxiety, depression who presents to ED for back pain.  Patient has history of disc herniation and is planned for discectomy with Dr. George on Monday.  He has been having progressively worsening back pain since his injury 2 weeks ago.  Pain is on his left side and radiates down his left leg.  He reports numbness and tingling down his leg and wraps around his thigh.  He denies any bowel or bladder incontinence.      Brief Synopsis: Patient was started on IV steroids. Neurosurgery was consulted. Patient was given pain control as needed. Patient underwent microdiscectomy and tolerated procedure well. He was discharged home with outpatient follow up.    Lace+ Score: 60  59-90 High Risk  29-58 Medium Risk  0-28   Low Risk       TCM Follow-Up Recommendation:  LACE > 58: High Risk of readmission after discharge from the hospital.      Procedures during hospitalization:   Left L1-2 far lateral minimally invasive microdiscectomy  Left L3-4 far lateral minimally invasive microdiscectomy    Incidental or significant findings and recommendations (brief descriptions):  See brief synopsis above     Lab/Test results pending at Discharge:   None    Consultants:  Neurosurgery     Discharge Medication List:     Discharge Medications         START taking these medications        Instructions Prescription details   acetaminophen 500 MG Tabs  Commonly known as: Tylenol Extra Strength      Take 2 tablets (1,000 mg total) by mouth every 4 (four) hours as needed for Pain.   Quantity: 120 tablet  Refills: 0     dexamethasone 1 MG Tabs  Commonly known as: Decadron  Start taking on: April 29, 2025      Take 4 tablets (4 mg total) by mouth 3 (three) times daily with meals for 2 days, THEN 4 tablets (4 mg total) 2 (two) times daily with meals for 2 days, THEN 4 tablets (4 mg total) daily with breakfast for 2 days, THEN 2 tablets (2 mg total) daily with breakfast for 2 days, THEN 1 tablet (1 mg total) daily with breakfast for 1 day.   Stop taking on: May 8, 2025  Quantity: 53 tablet  Refills: 0     oxyCODONE 5 MG Tabs      Take 1 tablet (5 mg total) by mouth every 4 (four) hours as needed.   Quantity: 30 tablet  Refills: 0            CHANGE how you take these medications        Instructions Prescription details   methocarbamol 500 MG Tabs  Commonly known as: Robaxin  What changed:   when to take this  additional instructions      Take 1 tablet (500 mg total) by mouth 3 (three) times daily.   Quantity: 60 tablet  Refills: 0            CONTINUE taking these medications        Instructions Prescription details   buPROPion  MG Tb24  Commonly known as: Wellbutrin XL      Take 1 tablet (150 mg total) by mouth daily.   Quantity: 90 tablet  Refills: 0     buPROPion  MG Tb24  Commonly known as: Wellbutrin XL      Take 1 tablet (300 mg total) by mouth daily.   Quantity: 90 tablet  Refills: 0     cloNIDine 0.1 MG Tabs  Commonly known as: Catapres      Take 1 tablet (0.1 mg total) by mouth at bedtime.   Refills: 0     Dyanavel XR 10 MG Libby  Generic drug: Amphetamine ER      Take 1 tablet by mouth daily.   Refills: 0     Dyanavel XR 20 MG Libby  Generic drug: Amphetamine ER      Take 1 tablet by mouth every morning.   Refills: 0     Eletriptan Hydrobromide 40  MG Tabs  Commonly known as: RELPAX      TAKE 1 TABLET AT ONSET. REPEAT ONCE AFTER 2 HOURS IF NEEDED, MAX 2 TABLETS PER 24 HOURS   Quantity: 18 tablet  Refills: 0     propranolol 10 MG Tabs  Commonly known as: Inderal      Take 1 tablet (10 mg total) by mouth daily. Take 30-60 minutes prior to performance/presentation   Refills: 0     rosuvastatin 10 MG Tabs  Commonly known as: Crestor      TAKE 1 TABLET NIGHTLY   Quantity: 90 tablet  Refills: 0     Trintellix 20 MG Tabs  Generic drug: vortioxetine      Take 1 tablet (20 mg total) by mouth daily.   Refills: 0            STOP taking these medications      traMADol 50 MG Tabs  Commonly known as: Ultram        traZODone 50 MG Tabs  Commonly known as: Desyrel                  Where to Get Your Medications        These medications were sent to [facility]/pharmacy #[id number] - [place], [place] - [address] [phone], [phone]  [facility] [address]      Phone: [phone]   acetaminophen 500 MG Tabs  dexamethasone 1 MG Tabs  methocarbamol 500 MG Tabs  oxyCODONE 5 MG Tabs         [place]     Follow-up appointment:   Hemal Martines MD  [address] [place] [id number]  [phone]    Follow up in 1 week(s)      Diogenes George MD  [facility] Kiera Ruffin, [address] [id number] [phone]    Schedule an appointment as soon as possible for a visit in 2 week(s)      Appointments for Next 30 Days 4/29/2025 - 5/29/2025        Date Arrival Time Visit Type Length Department Provider     5/5/2025  3:00 PM  FOLLOW UP VISIT [7426[facility]    Patient Instructions:         Location Instructions:     Masks are optional for all patients and visitors, unless otherwise indicated.               5/19/2025 10:45 AM  POST OP VISIT [0049] 30 min [facility] Cherelle Beauchamp PA    Patient Instructions:          Location Instructions:     Masks are optional for all patients and visitors, unless otherwise indicated.                      Vital signs:  Temp:  [97.3 °F (36.3 °C)-97.6 °F (36.4 °C)] 97.6 °F (36.4 °C)  Pulse:  [53-79] 59  Resp:  [7-19] 14  BP: (108-159)/(64-96) 140/82  SpO2:  [95 %-99 %] 99 %    Physical Exam:    General: No acute distress   Lungs: clear to auscultation  Cardiovascular: S1, S2  Abdomen: Soft      -----------------------------------------------------------------------------------------------  PATIENT DISCHARGE INSTRUCTIONS: See electronic chart    Jarred Hernandez DO    Total time spent on discharge plannin minutes     The  [place] Cures Act makes medical notes like these available to patients in the interest of transparency. Please be advised this is a medical document. Medical documents are intended to carry relevant information, facts as evident, and the clinical opinion of the practitioner. The medical note is intended as peer to peer communication and may appear blunt or direct. It is written in medical language and may contain abbreviations or verbiage that are unfamiliar.      68

## 2025-04-29 NOTE — ANESTHESIA PROCEDURE NOTES
Airway  Date/Time: 4/29/2025 11:08 AM  Reason: elective    Airway not difficult    General Information and Staff   Patient location during procedure: OR  Anesthesiologist: Julian Chapman MD  Performed: anesthesiologist   Performed by: Julian Chapman MD  Authorized by: Julian Chapman MD        Indications and Patient Condition  Indications for airway management: anesthesia  Sedation level: deep      Preoxygenated: yesPatient position: sniffing    Mask difficulty assessment: 1 - vent by mask    Final Airway Details    Final airway type: endotracheal airway    Successful airway: ETT  Cuffed: yes   Successful intubation technique: direct laryngoscopy  Endotracheal tube insertion site: oral  Blade: Mayelin  Blade size: #3  ETT size (mm): 7.5    Cormack-Lehane Classification: grade I - full view of glottis  Placement verified by: capnometry   Cuff volume (mL): 6  Measured from: lips  ETT to lips (cm): 22  Number of attempts at approach: 1  Number of other approaches attempted: 0

## 2025-04-29 NOTE — ANESTHESIA POSTPROCEDURE EVALUATION
[facility]    Shin Liang Patient Status:  Inpatient   Age/Gender 54 year old male MRN [id number]   Location [facility] SURGERY Attending Jarred Hernandez DO   Hosp Day # 4 PCP Hemal Martines MD       Anesthesia Post-op Note    Left lumbar 1-lumbar 2 far lateral minimally invasive microdiscectomy; left lumbar 3-lumbar 4 far lateral minimally invasive microdiscectomy    Procedure Summary       Date: 04/29/25 Room / Location:  MAIN OR [address] [place] [address] MAIN OR    Anesthesia Start: 1103 Anesthesia Stop:     Procedure: Left lumbar 1-lumbar 2 far lateral minimally invasive microdiscectomy; left lumbar 3-lumbar 4 far lateral minimally invasive microdiscectomy (Left: Spine Lumbar) Diagnosis:       Lumbar radiculopathy      Lumbar disc herniation with radiculopathy      (Lumbar radiculopathy [M54.16]Lumbar disc herniation with radiculopathy [M51.16])    Surgeons: Diogenes George MD Anesthesiologist: Julian Chapman MD    Anesthesia Type: general ASA Status: 2            Anesthesia Type: general    Vitals Value Taken Time   /95 04/29/25 14:21   Temp 97.3 °F (36.3 °C) 04/29/25 14:21   Pulse 65 04/29/25 14:21   Resp 12 04/29/25 14:21   SpO2 97 % 04/29/25 14:21           Patient Location: PACU    Anesthesia Type: general    Airway Patency: patent    Postop Pain Control: adequate    Mental Status: sedated until time of extubation    Nausea/Vomiting: none    Cardiopulmonary/Hydration status: stable euvolemic    Complications: no apparent anesthesia related complications    Postop vital signs: stable    Dental Exam: Unchanged from Preop    Patient to be discharged from PACU when criteria met.

## 2025-04-29 NOTE — PLAN OF CARE
A/o x4. VSS, on RA. Tele in place; NSR. Patient reports only slight pain with movement. Denies n/t. Ambulating often in room and hallway. IV steroids per orders. Tolerating PO intake.Declined pain medication at this time. Voiding without difficulty. LBM 4/27. Plan for OR tomorrow 4/28 @1100. POC updated with pt. All safety measures in place. Call light within reach instructed pt to call for help or assistance

## 2025-04-29 NOTE — ANESTHESIA PREPROCEDURE EVALUATION
PRE-OP EVALUATION    Patient Name: Shin Liang    Admit Diagnosis: Intractable low back pain [M54.59]    Pre-op Diagnosis: Lumbar radiculopathy [M54.16]  Lumbar disc herniation with radiculopathy [M51.16]    Left lumbar 1-lumbar 2 far lateral minimally invasive microdiscectomy; left lumbar 3-lumbar 4 far lateral minimally invasive microdiscectomy    Anesthesia Procedure: Left lumbar 1-lumbar 2 far lateral minimally invasive microdiscectomy; left lumbar 3-lumbar 4 far lateral minimally invasive microdiscectomy (Left)    Surgeons and Role:     * Diogenes George MD - Primary    Pre-op vitals reviewed.  Temp: 97.6 °F (36.4 °C)  Pulse: 62  Resp: 18  BP: 108/69  SpO2: 96 %  Body mass index is 22.63 kg/m².    Current medications reviewed.  Hospital Medications:  Current Medications[1]    Outpatient Medications:   Prescriptions Prior to Admission[2]    Allergies: Patient has no known allergies.      Anesthesia Evaluation    Patient summary reviewed.    Anesthetic Complications  (-) history of anesthetic complications         GI/Hepatic/Renal    Negative GI/hepatic/renal ROS.                             Cardiovascular    Negative cardiovascular ROS.  ECG reviewed.  Exercise tolerance: good     MET: >4                                           Endo/Other    Negative endo/other ROS.                              Pulmonary      (-) asthma  (-) COPD            (+) sleep apnea       Neuro/Psych      (+) depression  (+) anxiety  (-) CVA       (+) neuromuscular disease  (+) headaches                   Past Surgical History[3]  Social Hx on file[4]  History   Drug Use Unknown     Available pre-op labs reviewed.  Lab Results   Component Value Date    WBC 17.3 (H) 04/27/2025    RBC 4.80 04/27/2025    HGB 14.5 04/27/2025    HCT 42.9 04/27/2025    MCV 89.4 04/27/2025    MCH 30.2 04/27/2025    MCHC 33.8 04/27/2025    RDW 13.8 04/27/2025    .0 (H) 04/27/2025     Lab Results   Component Value Date      04/27/2025    K 4.4 04/27/2025     04/27/2025    CO2 26.0 04/27/2025    BUN 17 04/27/2025    CREATSERUM 0.94 04/27/2025     (H) 04/27/2025    CA 9.1 04/27/2025     Lab Results   Component Value Date    INR 0.97 04/27/2025         Airway      Mallampati: II  Mouth opening: >3 FB  TM distance: > 6 cm  Neck ROM: full Cardiovascular    Cardiovascular exam normal.  Rhythm: regular  Rate: normal     Dental    Dentition appears grossly intact         Pulmonary    Pulmonary exam normal.  Breath sounds clear to auscultation bilaterally.               Other findings              ASA: 2   Plan: general  NPO status verified and patient meets guidelines.    Post-procedure pain management plan discussed with surgeon and patient.      Plan/risks discussed with: patient  Use of blood product(s) discussed with: patient    Consented to blood products.          Present on Admission:  **None**             [1]    docusate sodium (Colace) cap 100 mg  100 mg Oral BID    magnesium hydroxide (Milk of Magnesia) 400 MG/5ML oral suspension 30 mL  30 mL Oral Daily PRN    [COMPLETED] morphINE PF 4 MG/ML injection 4 mg  4 mg Intravenous Once    [COMPLETED] ketorolac (Toradol) 15 MG/ML injection 15 mg  15 mg Intravenous Once    [COMPLETED] morphINE PF 4 MG/ML injection 4 mg  4 mg Intravenous Once    acetaminophen (Tylenol Extra Strength) tab 500 mg  500 mg Oral Q4H PRN    melatonin tab 3 mg  3 mg Oral Nightly PRN    guaiFENesin ER (Mucinex) 12 hr tab 600 mg  600 mg Oral BID PRN    benzonatate (Tessalon) cap 200 mg  200 mg Oral TID PRN    glycerin-hypromellose- (Artificial Tears) 0.2-0.2-1 % ophthalmic solution 1 drop  1 drop Both Eyes QID PRN    sodium chloride (Saline Mist) 0.65 % nasal solution 1 spray  1 spray Each Nare Q3H PRN    morphINE PF 2 MG/ML injection 1 mg  1 mg Intravenous Q2H PRN    Or    morphINE PF 2 MG/ML injection 2 mg  2 mg Intravenous Q2H PRN    Or    morphINE PF 4 MG/ML injection 4 mg  4 mg Intravenous Q2H  PRN    acetaminophen (Tylenol) tab 650 mg  650 mg Oral Q4H PRN    Or    HYDROcodone-acetaminophen (Norco) 5-325 MG per tab 1 tablet  1 tablet Oral Q4H PRN    Or    HYDROcodone-acetaminophen (Norco) 5-325 MG per tab 2 tablet  2 tablet Oral Q4H PRN    ondansetron (Zofran) 4 MG/2ML injection 4 mg  4 mg Intravenous Q6H PRN    prochlorperazine (Compazine) 10 MG/2ML injection 5 mg  5 mg Intravenous Q8H PRN    polyethylene glycol (PEG 3350) (Miralax) 17 g oral packet 17 g  17 g Oral Daily PRN    sennosides (Senokot) tab 17.2 mg  17.2 mg Oral Nightly PRN    bisacodyl (Dulcolax) 10 MG rectal suppository 10 mg  10 mg Rectal Daily PRN    fleet enema (Fleet) rectal enema 133 mL  1 enema Rectal Once PRN    buPROPion ER (Wellbutrin XL) 24 hr tab 150 mg  150 mg Oral Daily    buPROPion ER (Wellbutrin XL) 24 hr tab 300 mg  300 mg Oral Daily    cloNIDine (Catapres) tab 0.1 mg  0.1 mg Oral Nightly PRN    propranolol (Inderal) tab 10 mg  10 mg Oral Daily    methocarbamol (Robaxin) tab 500 mg  500 mg Oral Q6H PRN    rosuvastatin (Crestor) tab 10 mg  10 mg Oral Nightly    traMADol (Ultram) tab 50 mg  50 mg Oral Q6H PRN    traZODone (Desyrel) tab  mg   mg Oral Nightly    vortioxetine (Trintellix) tab 20 mg  20 mg Oral Daily    dexamethasone (Decadron) 4 MG/ML injection 6 mg  6 mg Intravenous Q6H    pregabalin (Lyrica) cap 75 mg  75 mg Oral BID   [2]   Medications Prior to Admission   Medication Sig Dispense Refill Last Dose/Taking    traZODone 50 MG Oral Tab Take 1-2 tablets ( mg total) by mouth nightly.   4/22/2025 Bedtime    propranolol 10 MG Oral Tab Take 1 tablet (10 mg total) by mouth daily. Take 30-60 minutes prior to performance/presentation   4/22/2025 Morning    TRINTELLIX 20 MG Oral Tab Take 1 tablet (20 mg total) by mouth daily.   4/22/2025 Morning    traMADol 50 MG Oral Tab Take 1 tablet (50 mg total) by mouth every 6 (six) hours as needed for Pain. 30 tablet 0 4/22/2025    methocarbamol 500 MG Oral Tab Take  1 tablet (500 mg total) by mouth 4 (four) times daily. As needed for muscle spasm 15 tablet 0 4/22/2025    ROSUVASTATIN 10 MG Oral Tab TAKE 1 TABLET NIGHTLY 90 tablet 0 Past Month    buPROPion  MG Oral Tablet 24 Hr Take 1 tablet (300 mg total) by mouth daily. 90 tablet 0 Past Week    cloNIDine 0.1 MG Oral Tab Take 1 tablet (0.1 mg total) by mouth at bedtime. (Patient taking differently: Take 1 tablet (0.1 mg total) by mouth nightly as needed (sleep).)   Past Month    DYANAVEL XR 20 MG Oral Tablet Chewable Extended Release Take 1 tablet by mouth every morning. (Patient taking differently: Take 1 tablet by mouth daily as needed (Monday through Friday for focus).)   Past Week    buPROPion  MG Oral Tablet 24 Hr Take 1 tablet (150 mg total) by mouth daily. 90 tablet 0 Past Week    Eletriptan Hydrobromide 40 MG Oral Tab TAKE 1 TABLET AT ONSET. REPEAT ONCE AFTER 2 HOURS IF NEEDED, MAX 2 TABLETS PER 24 HOURS 18 tablet 0 More than a month    DYANAVEL XR 10 MG Oral Tablet Chewable Extended Release Take 1 tablet by mouth daily. 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

## 2025-04-29 NOTE — PLAN OF CARE
Pt A&Ox4. VSS on room air. Telemetry monitoring - NSR. NPO for surgery today with Dr George. Pain controlled at this time. IV decadron q6. Safety precautions in place and POC discussed with pt.

## 2025-04-29 NOTE — DIETARY NOTE
[facility]   part of [facility]   CLINICAL NUTRITION    Shin Liang     Admitting diagnosis:  Intractable low back pain [M54.59]    Ht:  179.1 cm (5' 10.5\")  Wt: 72.6 kg (160 lb).   Body mass index is 22.63 kg/m².  IBW: 76.8 kg    Wt Readings from Last 6 Encounters:   04/25/25 72.6 kg (160 lb)   04/22/25 72.6 kg (160 lb)   04/22/25 72.6 kg (160 lb)   04/13/25 74.8 kg (164 lb 14.5 oz)   12/24/24 74.8 kg (165 lb)   07/10/24 75.3 kg (166 lb)        Labs/Meds reviewed    Diet:       Procedures    NPO     Percent Meals Eaten (last 3 days)       Date/Time Percent Meals Eaten (%)    04/26/25 1019 100 %    04/27/25 1300 100 %    04/27/25 1607 100 %              Pt chart reviewed d/t LOS. 55 y/o male presents with back pain. Plan for discectomy today, pt currently NPO. Pt visited, reported good appetite prior to NPO status. Ordering 3 meals per day, on regular diet. Weight stable since admit. Answered all questions at this time. Will follow per protocol.    Patient reports good appetite at this time.  Nursing notes reports Percent Meals Eaten (%): 100 % intake for last meal.  Tolerating po diet without diarrhea, emesis, or constipation.   No significant weight changes noted.     Patient is at low nutrition risk at this time.    Please consult if patient status changes or nutrition issues arise.      Melissa Garcia, MS, RDN, LDN  Clinical Dietitian

## 2025-04-29 NOTE — PROGRESS NOTES
Pt return to room 380 from PACU. Pt A&Ox4. VSS on room air. Pain controlled at this time. Incisions to low back with band-aids C/D/I. Gel ice packs in place. Moody catheter removed, DTV. Pt would like to discharge tonight, cleared from all services once eating, voiding, and pain controlled. POC discussed with pt. Safety precautions in place.

## 2025-04-30 PROCEDURE — 99239 HOSP IP/OBS DSCHRG MGMT >30: CPT | Performed by: STUDENT IN AN ORGANIZED HEALTH CARE EDUCATION/TRAINING PROGRAM

## 2025-04-30 NOTE — OPERATIVE REPORT
Neurosurgery operative report        Preoperative diagnosis:  1.  Left L1-2 far lateral disc herniation with intractable radiculopathy  2.  Left L3-4 far lateral disc herniation with intractable radiculopathy      Postoperative diagnosis:  Same      Procedure performed:  1.  Left L1-2 far lateral minimally invasive microdiscectomy  2.  Left L3-4 far lateral minimally invasive microdiscectomy  3.  Use of operating microscope with surgical microdissection techniques  4.  Use of intraoperative fluoroscopy with live interpretation of intraoperative imaging        Surgeon: Diogenes George MD        Assistant: Cherelle Rees PA-C        Anesthesia: General endotracheal        Estimated blood loss: 25 cc        Indications for procedure:    Please also see the relevant progress notes for further information.  Briefly, this patient has been evaluated on several occasions for left-sided radiculopathy.  He underwent epidural steroid injection which did not provide durable relief.  The patient was scheduled for operation on an urgent basis.  He presented to the emergency department before the scheduled operation because of  increased, intractable pain.    The patient was counseled regarding available surgical and nonsurgical treatment options.  Following discussion of risks and benefits, he chose to proceed with this operation.        Procedure in detail:    The patient was reevaluated in the preoperative holding area.  The patient was reexamined.  The operation was reviewed, including risks, benefits, and alternatives.  Informed consent was verified.    The patient was then brought back to the operating theater.  A timeout was performed per protocol.  General endotracheal anesthesia was induced.  Lines and monitors were started by the anesthesiologist.    The patient was placed in a prone position on a Gerardo table.  All pressure points were carefully padded and checked.  The back was cleansed with isopropyl alcohol.   Reference marks were made with skin marker.  An 18-gauge spinal needle was placed.  AP and lateral fluoroscopy were used to determine incision placement for the L1-2 and the L3-4 incisions.    DuraPrep was now applied followed by standard sterile surgical draping.  Lidocaine with epinephrine was injected.  Another timeout was performed per protocol.    18 mm skin incisions were made at the L1-2 and L3-4 levels.  K wires were placed at each level under fluoroscopic guidance followed by the first dilator.  AP and lateral fluoroscopy was used as needed to gain access to the lateral aspect of the foramen at each level.  Appropriate length working ports were placed and connected to separate rigid mounting arms.  Final AP and lateral images were taken and found to be satisfactory.    At this point, the operating microscope was brought onto the field.  The operating microscope and surgical microdissection techniques were used for the balance of the operation until closure.    Beginning at the L1-2 level, a small amount of remaining muscle in the surgical field was cleared away with a pituitary rongeur.  The intertransverse membrane was identified.  This was sectioned with a Penfield 4 elevator and resected with a series of Kerrison rongeurs.  Perineural fat was identified.  Meticulous dissection was carried down through this to identify the nerve root.  The nerve root was dissected free medially and laterally.  Underlying the nerve root was a substantial disc herniation.    The nerve root was gently mobilized laterally, and the disc herniation was entered with a #11 scalpel blade.  1 large fragment of herniated disc material was retrieved followed by a number of smaller fragments.  At the conclusion of this process, the nerve root was found to be well decompressed.  The incision was irrigated copiously with saline solution, and meticulous hemostasis was assured with bipolar electrocautery.    The microscope was now  repositioned to the L3-4 level.  Here as well, a small amount of remaining musculature was removed with a pituitary rongeur.  The intertransverse membrane was divided and resected.  Dissection was carried out to identify the nerve root, as well as the underlying disc herniation.  Here, also, was a substantial disc herniation causing significant nerve root compression.  In this case, a more lateral entry corridor was selected based on the anatomy of the disc herniation.  The disc herniation was incised with a #11 scalpel blade.  A series of nerve hooks and a micropituitary rongeur were used to remove several fragments of herniated disc material.  At the conclusion of this process, the nerve root was found to be very well decompressed.    Both incisions were irrigated copiously with saline solution.  Both were inspected under microscopic visualization, and meticulous hemostasis was assured with bipolar electrocautery and Floseal.    The retractors were now removed under microscopic visualization, and meticulous muscular hemostasis was assured with bipolar electrocautery.    Both incisions, fascia was closed with 0 Vicryl on a UR 6 needle in a figure-of-eight fashion.  Deep dermis was closed with interrupted inverted 3-0 Vicryl's.  Skin was closed with running 4-0 Vicryl subcuticular sutures.  Mastisol and Steri-Strips were applied.  A dressing was applied.  Drapes were removed.  The patient was now returned to a neutral, supine position.        Disposition: Postanesthesia care unit    Condition: Stable, extubated, and neurologically at baseline    Counts: All needle, sponge, and cottonoid counts were reported correct at the end of the operation.    Complications: None    Wound classification: 1, clean    Implants: None    Specimen: None        This report was dictated using voice recognition technology.  Errors in syntax or recognition may occur, and should not be construed to change the meaning of a sentence.

## 2025-04-30 NOTE — PROGRESS NOTES
A/o x4. Ra/. Tele:NSR. Bandaid x2 in place cdi . Denies pain, numbness tingling. Pain minimal. Declined pain medication. Pt was able to void 200mL clear yellow urine. Denies feelings of retention. Pt wanting to dc home tonight. Discharge paperwork reviewed with patient and daughter. IV removed. Daughter at bedside. Pt ok to ambulate to car. All belongings sent home with pt.

## 2025-05-01 ENCOUNTER — PATIENT OUTREACH (OUTPATIENT)
Dept: CASE MANAGEMENT | Age: 55
End: 2025-05-01

## 2025-05-01 ENCOUNTER — TELEPHONE (OUTPATIENT)
Dept: SURGERY | Facility: CLINIC | Age: 55
End: 2025-05-01

## 2025-05-01 NOTE — PROGRESS NOTES
TCM Request  Hospital Follow up for PCP (Discharge 4/30 edw)     PCP   Hemal Martines  [facility]  [address] [place] [id number] [phone]  Pt declined make follow up appt     Confirmed with pt  Closing encounter

## 2025-05-01 NOTE — TELEPHONE ENCOUNTER
Rec'd DOS 5/1/2025 Short Term Disability (STD) Attending Physician Statement for completion. Emailed to Forms, original sent via interoffice mail. Rec'd via fax so no fee was collected. Endorsed to EM Forms

## 2025-05-02 NOTE — PAYOR COMM NOTE
--------------  CONTINUED STAY REVIEW  4/29  Payor: [employer]  Subscriber #:  [id number]  Authorization Number: [id number]    Admit date: 4/25/25  Admit time:  2:38 PM    REVIEW DOCUMENTATION:    4/29/2025 11:47 AM Left lumbar 1-lumbar 2 far lateral minimally invasive microdiscectomy; left lumbar 3-lumbar 4 far lateral minimally invasive microdiscectomy    Diogenes George MD               Signed       Neurosurgery operative report     Preoperative diagnosis:  1.  Left L1-2 far lateral disc herniation with intractable radiculopathy  2.  Left L3-4 far lateral disc herniation with intractable radiculopathy      Postoperative diagnosis:Same      Procedure performed:  1.  Left L1-2 far lateral minimally invasive microdiscectomy  2.  Left L3-4 far lateral minimally invasive microdiscectomy  3.  Use of operating microscope with surgical microdissection techniques  4.  Use of intraoperative fluoroscopy with live interpretation of intraoperative imaging              Indications for procedure:     Please also see the relevant progress notes for further information.  Briefly, this patient has been evaluated on several occasions for left-sided radiculopathy.  He underwent epidural steroid injection which did not provide durable relief.  The patient was scheduled for operation on an urgent basis.  He presented to the emergency department before the scheduled operation because of  increased, intractable pain.     The patient was counseled regarding available surgical and nonsurgical treatment options.  Following discussion of risks and benefits, he chose to proceed with this operation.      Discharged 4/29/25 11:15pm    Vitals (last day) before discharge       Date/Time Temp Pulse Resp BP SpO2 Weight O2 Device O2 Flow Rate (L/min) [facility]    04/29/25 2000 97.9 °F (36.6 °C) 89 18 131/71 97 % -- None (Room air) 0 L/min     04/29/25 1605 97.6 °F (36.4 °C) 59 14 140/82 99 % -- None (Room air) --     04/29/25 1535 -- 53 15  -- 97 % -- -- --     25 1530 -- 60 19 -- 97 % -- -- --     25 1515 97.4 °F (36.3 °C) 58 13 134/96 96 % -- None (Room air) --     25 1500 -- 58 14 152/91 97 % -- None (Room air) --     25 1445 -- 57 14 159/92 99 % -- None (Room air) --     25 1435 -- 70 14 148/95 99 % -- -- --     25 1430 -- 75 7 146/89 99 % -- None (Room air) --     25 1425 -- 61 10 151/93 97 % -- -- --     25 1421 -- -- -- -- -- -- None (Room air) --     25 1421 97.3 °F (36.3 °C) 65 12 140/95 97 % -- -- --     25 0400 97.6 °F (36.4 °C) 62 18 108/69 96 % -- None (Room air) 0 L/min     25 0000 97.5 °F (36.4 °C) 79 18 122/64 95 % -- None (Room air) 0 L/min [facility]             --------------  DISCHARGE REVIEW    Payor: [employer]  Subscriber #:  [id number]  Authorization Number: [id number]    Admit date: 25  Admit time:   2:38 PM  Discharge Date: 2025 12:00 AM     Admitting Physician: Jarred Hernandez DO  Attending Physician:  No att. providers found  Primary Care Physician: Hemal Martines MD          Discharge Summary Notes        Discharge Summary signed by Jarred Hernandez DO at 2025  9:13 AM       Author: Jarred Hernandez DO Specialty: HOSPITALIST Author Type: Physician    Filed: 2025  9:13 AM Date of Service: 2025  6:14 PM Status: Signed    : Jarred Hernandez DO (Physician)           [facility]  DISCHARGE SUMMARY     Shin Liang Patient Status:  Inpatient    1970 MRN [id number]   Location [facility] 3SW-A Attending Dayne Hernandez, *   Hosp Day # 4 PCP Hemal Martines MD     Date of Admission: 2025  Date of Discharge:   2025    Discharge Disposition: Home or Self Care    Discharge Diagnosis:  #Intractable back pain  #Lumbar disc herniation  - Plan for L1-L2, L3-L4 discectomy today  - IV steroids  - Pain control as needed  - Neurosurgery following      #Hyperlipidemia  - Statin      #Anxiety  #Depression  - Bupropion    History of Present Illness: Shin Liang is a 54 year old male with past medical history of hyperlipidemia, anxiety, depression who presents to ED for back pain.  Patient has history of disc herniation and is planned for discectomy with Dr. George on Monday.  He has been having progressively worsening back pain since his injury 2 weeks ago.  Pain is on his left side and radiates down his left leg.  He reports numbness and tingling down his leg and wraps around his thigh.  He denies any bowel or bladder incontinence.      Brief Synopsis: Patient was started on IV steroids. Neurosurgery was consulted. Patient was given pain control as needed. Patient underwent microdiscectomy and tolerated procedure well. He was discharged home with outpatient follow up.    Lace+ Score: 60  59-90 High Risk  29-58 Medium Risk  0-28   Low Risk       TCM Follow-Up Recommendation:  LACE > 58: High Risk of readmission after discharge from the hospital.      Procedures during hospitalization:   Left L1-2 far lateral minimally invasive microdiscectomy  Left L3-4 far lateral minimally invasive microdiscectomy    Incidental or significant findings and recommendations (brief descriptions):  See brief synopsis above     Lab/Test results pending at Discharge:   None    Consultants:  Neurosurgery     Discharge Medication List:     Discharge Medications        START taking these medications        Instructions Prescription details   acetaminophen 500 MG Tabs  Commonly known as: Tylenol Extra Strength      Take 2 tablets (1,000 mg total) by mouth every 4 (four) hours as needed for Pain.   Quantity: 120 tablet  Refills: 0     dexamethasone 1 MG Tabs  Commonly known as: Decadron  Start taking on: April 29, 2025      Take 4 tablets (4 mg total) by mouth 3 (three) times daily with meals for 2 days, THEN 4 tablets (4 mg total) 2 (two) times daily with meals for 2 days, THEN 4 tablets (4 mg total) daily with  breakfast for 2 days, THEN 2 tablets (2 mg total) daily with breakfast for 2 days, THEN 1 tablet (1 mg total) daily with breakfast for 1 day.   Stop taking on: May 8, 2025  Quantity: 53 tablet  Refills: 0     oxyCODONE 5 MG Tabs      Take 1 tablet (5 mg total) by mouth every 4 (four) hours as needed.   Quantity: 30 tablet  Refills: 0            CHANGE how you take these medications        Instructions Prescription details   methocarbamol 500 MG Tabs  Commonly known as: Robaxin  What changed:   when to take this  additional instructions      Take 1 tablet (500 mg total) by mouth 3 (three) times daily.   Quantity: 60 tablet  Refills: 0            CONTINUE taking these medications        Instructions Prescription details   buPROPion  MG Tb24  Commonly known as: Wellbutrin XL      Take 1 tablet (150 mg total) by mouth daily.   Quantity: 90 tablet  Refills: 0     buPROPion  MG Tb24  Commonly known as: Wellbutrin XL      Take 1 tablet (300 mg total) by mouth daily.   Quantity: 90 tablet  Refills: 0     cloNIDine 0.1 MG Tabs  Commonly known as: Catapres      Take 1 tablet (0.1 mg total) by mouth at bedtime.   Refills: 0     Dyanavel XR 10 MG Libby  Generic drug: Amphetamine ER      Take 1 tablet by mouth daily.   Refills: 0     Dyanavel XR 20 MG Libby  Generic drug: Amphetamine ER      Take 1 tablet by mouth every morning.   Refills: 0     Eletriptan Hydrobromide 40 MG Tabs  Commonly known as: RELPAX      TAKE 1 TABLET AT ONSET. REPEAT ONCE AFTER 2 HOURS IF NEEDED, MAX 2 TABLETS PER 24 HOURS   Quantity: 18 tablet  Refills: 0     propranolol 10 MG Tabs  Commonly known as: Inderal      Take 1 tablet (10 mg total) by mouth daily. Take 30-60 minutes prior to performance/presentation   Refills: 0     rosuvastatin 10 MG Tabs  Commonly known as: Crestor      TAKE 1 TABLET NIGHTLY   Quantity: 90 tablet  Refills: 0     Trintellix 20 MG Tabs  Generic drug: vortioxetine      Take 1 tablet (20 mg total) by mouth daily.    Refills: 0            STOP taking these medications      traMADol 50 MG Tabs  Commonly known as: Ultram        traZODone 50 MG Tabs  Commonly known as: Desyrel                  Where to Get Your Medications        These medications were sent to [facility]/pharmacy #[id number] - [place], [place] - [address] [phone], [phone]  [address] [facility] [address]      Phone: [phone]   acetaminophen 500 MG Tabs  dexamethasone 1 MG Tabs  methocarbamol 500 MG Tabs  oxyCODONE 5 MG Tabs         [place]     Follow-up appointment:   Hemal Martines MD  [address] [place] [address] [address]  [facility] [address] [phone]    Follow up in 1 week(s)      Diogenes George MD  [address], [facility] [address]  [facility] [id number] [phone]    Schedule an appointment as soon as possible for a visit in 2 week(s)      Appointments for Next 30 Days 4/29/2025 - 5/29/2025        Date Arrival Time Visit Type Length Department Provider     5/5/2025  3:00 PM  FOLLOW UP VISIT [5998] 15 min [facility] Elroy Oconnor MD    Patient Instructions:         Location Instructions:     Masks are optional for all patients and visitors, unless otherwise indicated.               5/19/2025 10:45 AM  POST OP VISIT [1169] 30 min [facility] Cherelle Beauchamp PA    Patient Instructions:         Location Instructions:     Masks are optional for all patients and visitors, unless otherwise indicated.                      Vital signs:  Temp:  [97.3 °F (36.3 °C)-97.6 °F (36.4 °C)] 97.6 °F (36.4 °C)  Pulse:  [53-79] 59  Resp:  [7-19] 14  BP: (108-159)/(64-96) 140/82  SpO2:  [95 %-99 %] 99 %    Physical Exam:    General: No acute distress   Lungs: clear to auscultation  Cardiovascular: S1, S2  Abdomen: Soft      -----------------------------------------------------------------------------------------------  PATIENT DISCHARGE INSTRUCTIONS: See  electronic chart    Jarred Hernandez DO    Total time spent on discharge plannin minutes     The  [employer] C[employer] Act makes medical notes like these available to patients in the interest of transparency. Please be advised this is a medical document. Medical documents are intended to carry relevant information, facts as evident, and the clinical opinion of the practitioner. The medical note is intended as peer to peer communication and may appear blunt or direct. It is written in medical language and may contain abbreviations or verbiage that are unfamiliar.       Electronically signed by Jarred Hernandez DO on 2025  9:13 AM         REVIEWER COMMENTS

## 2025-05-07 ENCOUNTER — TELEPHONE (OUTPATIENT)
Dept: SURGERY | Facility: CLINIC | Age: 55
End: 2025-05-07

## 2025-05-07 NOTE — TELEPHONE ENCOUNTER
Pt scheduled for surgery on 4.28.25 with Dr. George     Pt reminder placed to appear 3-5 days after sx     Initiate TE for pt outreach     If pt was prescribed a Bone Growth Stimulator, fax the Op Note to Bello from [facility] at Fax #: [phone]

## 2025-05-07 NOTE — TELEPHONE ENCOUNTER
Patient had left L1-2 and L3-4 far lateral microdiscectomy on 4/29/25 by Dr. George  Post op day 8    1)Asked how Shin is feeling in general he stated:  Patient states so far he is doing pretty good. Patient states he has been fairly mobile. Just has incisional pain.     2) Discussed Dressing with patient, informed them dressing can be removed on day 3,  Removed    3)Site check for redness, drainage, swelling, discoloration, fever, etc.  Patient states: Patient denies    4) Discussed Current Pain Level:  Pain currently 1-2/10    Pain starts more at night due to movement during the day. Just incisional pain.     If patient is struggling with pain control, discuss recommendations to help with pain  Patient states pain medications have been well managing his pain.     5) Discussed Symptom improvement with patient they stated:  Patient states not as much nerve pain.     6) Discussed medication and asked if he currently needs any refills, Pt stated:   Patient denies any refills at this time.      7) Confirm post-op appointments with patient:    3 week: 5/13/25 with Trinh  6 week: 6/10/25 with Dr. George  3 month: 7/22/25 with Trinh    8) Discussed Surgical Restrictions with patient:  No baths/pool/hot tub  No bending, lifting, twisting at least until 2 week follow up  Acknowledged      9)Verified if pt had any other issues they needed to discuss:  Patient states he has no issues at this time.      10)Provided update on FMLA (received, initiated, need signature, completed etc)  NA        Routed to provider for update

## 2025-05-09 NOTE — TELEPHONE ENCOUNTER
Disability forms rec'd via email. Sent [employer] message for Release of Information. Logged for processing.

## 2025-05-11 ENCOUNTER — HOSPITAL ENCOUNTER (EMERGENCY)
Facility: HOSPITAL | Age: 55
Discharge: LEFT BEFORE DISCHARGE | End: 2025-05-11
Payer: COMMERCIAL

## 2025-05-11 VITALS
DIASTOLIC BLOOD PRESSURE: 78 MMHG | RESPIRATION RATE: 24 BRPM | TEMPERATURE: 99 F | OXYGEN SATURATION: 97 % | SYSTOLIC BLOOD PRESSURE: 152 MMHG | BODY MASS INDEX: 22.65 KG/M2 | HEART RATE: 88 BPM | WEIGHT: 160 LBS | HEIGHT: 70.5 IN

## 2025-05-12 ENCOUNTER — HOSPITAL ENCOUNTER (OUTPATIENT)
Facility: HOSPITAL | Age: 55
Setting detail: OBSERVATION
Discharge: HOME | End: 2025-05-12
Attending: EMERGENCY MEDICINE | Admitting: HOSPITALIST
Payer: COMMERCIAL

## 2025-05-12 ENCOUNTER — APPOINTMENT (OUTPATIENT)
Dept: CT IMAGING | Facility: HOSPITAL | Age: 55
End: 2025-05-12
Attending: EMERGENCY MEDICINE
Payer: COMMERCIAL

## 2025-05-12 VITALS
DIASTOLIC BLOOD PRESSURE: 90 MMHG | OXYGEN SATURATION: 98 % | BODY MASS INDEX: 22.9 KG/M2 | TEMPERATURE: 99 F | RESPIRATION RATE: 32 BRPM | HEART RATE: 73 BPM | HEIGHT: 70 IN | SYSTOLIC BLOOD PRESSURE: 162 MMHG | WEIGHT: 160 LBS

## 2025-05-12 DIAGNOSIS — N20.0 KIDNEY STONE: Primary | ICD-10-CM

## 2025-05-12 DIAGNOSIS — F90.9 ATTENTION DEFICIT HYPERACTIVITY DISORDER (ADHD), UNSPECIFIED ADHD TYPE: ICD-10-CM

## 2025-05-12 DIAGNOSIS — N23 RENAL COLIC: ICD-10-CM

## 2025-05-12 LAB
ALBUMIN SERPL-MCNC: 4.7 G/DL (ref 3.2–4.8)
ALBUMIN/GLOB SERPL: 2 {RATIO} (ref 1–2)
ALP LIVER SERPL-CCNC: 63 U/L (ref 45–117)
ALT SERPL-CCNC: 26 U/L (ref 10–49)
ANION GAP SERPL CALC-SCNC: 9 MMOL/L (ref 0–18)
AST SERPL-CCNC: 24 U/L (ref ?–34)
BASOPHILS # BLD AUTO: 0.05 X10(3) UL (ref 0–0.2)
BASOPHILS NFR BLD AUTO: 0.5 %
BILIRUB SERPL-MCNC: 0.4 MG/DL (ref 0.3–1.2)
BILIRUB UR QL STRIP.AUTO: NEGATIVE
BUN BLD-MCNC: 14 MG/DL (ref 9–23)
CALCIUM BLD-MCNC: 9.1 MG/DL (ref 8.7–10.6)
CHLORIDE SERPL-SCNC: 103 MMOL/L (ref 98–112)
CLARITY UR REFRACT.AUTO: CLEAR
CO2 SERPL-SCNC: 23 MMOL/L (ref 21–32)
CREAT BLD-MCNC: 1.33 MG/DL (ref 0.7–1.3)
EGFRCR SERPLBLD CKD-EPI 2021: 64 ML/MIN/1.73M2 (ref 60–?)
EOSINOPHIL # BLD AUTO: 0.2 X10(3) UL (ref 0–0.7)
EOSINOPHIL NFR BLD AUTO: 2.1 %
ERYTHROCYTE [DISTWIDTH] IN BLOOD BY AUTOMATED COUNT: 13.9 %
GLOBULIN PLAS-MCNC: 2.3 G/DL (ref 2–3.5)
GLUCOSE BLD-MCNC: 97 MG/DL (ref 70–99)
GLUCOSE UR STRIP.AUTO-MCNC: NORMAL MG/DL
HCT VFR BLD AUTO: 43.2 % (ref 39–53)
HGB BLD-MCNC: 14.7 G/DL (ref 13–17.5)
IMM GRANULOCYTES # BLD AUTO: 0.05 X10(3) UL (ref 0–1)
IMM GRANULOCYTES NFR BLD: 0.5 %
KETONES UR STRIP.AUTO-MCNC: NEGATIVE MG/DL
LEUKOCYTE ESTERASE UR QL STRIP.AUTO: NEGATIVE
LYMPHOCYTES # BLD AUTO: 2.55 X10(3) UL (ref 1–4)
LYMPHOCYTES NFR BLD AUTO: 27 %
MCH RBC QN AUTO: 30.4 PG (ref 26–34)
MCHC RBC AUTO-ENTMCNC: 34 G/DL (ref 31–37)
MCV RBC AUTO: 89.3 FL (ref 80–100)
MONOCYTES # BLD AUTO: 0.68 X10(3) UL (ref 0.1–1)
MONOCYTES NFR BLD AUTO: 7.2 %
NEUTROPHILS # BLD AUTO: 5.93 X10 (3) UL (ref 1.5–7.7)
NEUTROPHILS # BLD AUTO: 5.93 X10(3) UL (ref 1.5–7.7)
NEUTROPHILS NFR BLD AUTO: 62.7 %
NITRITE UR QL STRIP.AUTO: NEGATIVE
OSMOLALITY SERPL CALC.SUM OF ELEC: 280 MOSM/KG (ref 275–295)
PH UR STRIP.AUTO: 6.5 [PH] (ref 5–8)
PLATELET # BLD AUTO: 440 10(3)UL (ref 150–450)
POTASSIUM SERPL-SCNC: 3.9 MMOL/L (ref 3.5–5.1)
PROT SERPL-MCNC: 7 G/DL (ref 5.7–8.2)
PROT UR STRIP.AUTO-MCNC: NEGATIVE MG/DL
RBC # BLD AUTO: 4.84 X10(6)UL (ref 4.3–5.7)
SODIUM SERPL-SCNC: 135 MMOL/L (ref 136–145)
SP GR UR STRIP.AUTO: 1.01 (ref 1–1.03)
UROBILINOGEN UR STRIP.AUTO-MCNC: NORMAL MG/DL
WBC # BLD AUTO: 9.5 X10(3) UL (ref 4–11)

## 2025-05-12 PROCEDURE — 99222 1ST HOSP IP/OBS MODERATE 55: CPT | Performed by: PHYSICIAN ASSISTANT

## 2025-05-12 PROCEDURE — 74176 CT ABD & PELVIS W/O CONTRAST: CPT | Performed by: EMERGENCY MEDICINE

## 2025-05-12 PROCEDURE — 99215 OFFICE O/P EST HI 40 MIN: CPT | Performed by: HOSPITALIST

## 2025-05-12 RX ORDER — KETOROLAC TROMETHAMINE 30 MG/ML
15 INJECTION, SOLUTION INTRAMUSCULAR; INTRAVENOUS EVERY 6 HOURS PRN
Status: DISCONTINUED | OUTPATIENT
Start: 2025-05-12 | End: 2025-05-12

## 2025-05-12 RX ORDER — HYDROMORPHONE HYDROCHLORIDE 1 MG/ML
0.5 INJECTION, SOLUTION INTRAMUSCULAR; INTRAVENOUS; SUBCUTANEOUS EVERY 30 MIN PRN
Refills: 0 | Status: DISCONTINUED | OUTPATIENT
Start: 2025-05-12 | End: 2025-05-12

## 2025-05-12 RX ORDER — ONDANSETRON 2 MG/ML
4 INJECTION INTRAMUSCULAR; INTRAVENOUS EVERY 6 HOURS PRN
Status: DISCONTINUED | OUTPATIENT
Start: 2025-05-12 | End: 2025-05-12

## 2025-05-12 RX ORDER — PROPRANOLOL HYDROCHLORIDE 10 MG/1
10 TABLET ORAL DAILY
Status: DISCONTINUED | OUTPATIENT
Start: 2025-05-12 | End: 2025-05-12

## 2025-05-12 RX ORDER — MORPHINE SULFATE 4 MG/ML
4 INJECTION, SOLUTION INTRAMUSCULAR; INTRAVENOUS EVERY 30 MIN PRN
Status: DISCONTINUED | OUTPATIENT
Start: 2025-05-12 | End: 2025-05-12

## 2025-05-12 RX ORDER — PROCHLORPERAZINE EDISYLATE 5 MG/ML
5 INJECTION INTRAMUSCULAR; INTRAVENOUS EVERY 8 HOURS PRN
Status: DISCONTINUED | OUTPATIENT
Start: 2025-05-12 | End: 2025-05-12

## 2025-05-12 RX ORDER — BUPROPION HYDROCHLORIDE 150 MG/1
150 TABLET ORAL DAILY
Status: DISCONTINUED | OUTPATIENT
Start: 2025-05-12 | End: 2025-05-12

## 2025-05-12 RX ORDER — BUPROPION HYDROCHLORIDE 300 MG/1
300 TABLET ORAL DAILY
Status: DISCONTINUED | OUTPATIENT
Start: 2025-05-12 | End: 2025-05-12

## 2025-05-12 RX ORDER — POLYETHYLENE GLYCOL 3350 17 G/17G
17 POWDER, FOR SOLUTION ORAL DAILY PRN
Status: DISCONTINUED | OUTPATIENT
Start: 2025-05-12 | End: 2025-05-12

## 2025-05-12 RX ORDER — HYDROCODONE BITARTRATE AND ACETAMINOPHEN 5; 325 MG/1; MG/1
2 TABLET ORAL EVERY 4 HOURS PRN
Status: DISCONTINUED | OUTPATIENT
Start: 2025-05-12 | End: 2025-05-12

## 2025-05-12 RX ORDER — KETOROLAC TROMETHAMINE 15 MG/ML
15 INJECTION, SOLUTION INTRAMUSCULAR; INTRAVENOUS ONCE
Status: DISCONTINUED | OUTPATIENT
Start: 2025-05-12 | End: 2025-05-12

## 2025-05-12 RX ORDER — MORPHINE SULFATE 2 MG/ML
2 INJECTION, SOLUTION INTRAMUSCULAR; INTRAVENOUS EVERY 2 HOUR PRN
Status: DISCONTINUED | OUTPATIENT
Start: 2025-05-12 | End: 2025-05-12

## 2025-05-12 RX ORDER — CLONIDINE HYDROCHLORIDE 0.1 MG/1
0.1 TABLET ORAL NIGHTLY PRN
Status: SHIPPED | COMMUNITY
Start: 2025-05-12

## 2025-05-12 RX ORDER — ROSUVASTATIN CALCIUM 5 MG/1
10 TABLET, COATED ORAL NIGHTLY
Status: DISCONTINUED | OUTPATIENT
Start: 2025-05-12 | End: 2025-05-12

## 2025-05-12 RX ORDER — ONDANSETRON 2 MG/ML
4 INJECTION INTRAMUSCULAR; INTRAVENOUS ONCE
Status: COMPLETED | OUTPATIENT
Start: 2025-05-12 | End: 2025-05-12

## 2025-05-12 RX ORDER — KETOROLAC TROMETHAMINE 30 MG/ML
30 INJECTION, SOLUTION INTRAMUSCULAR; INTRAVENOUS EVERY 6 HOURS PRN
Status: DISCONTINUED | OUTPATIENT
Start: 2025-05-12 | End: 2025-05-12

## 2025-05-12 RX ORDER — SENNOSIDES 8.6 MG
17.2 TABLET ORAL NIGHTLY PRN
Status: DISCONTINUED | OUTPATIENT
Start: 2025-05-12 | End: 2025-05-12

## 2025-05-12 RX ORDER — TAMSULOSIN HYDROCHLORIDE 0.4 MG/1
0.8 CAPSULE ORAL DAILY
Status: DISCONTINUED | OUTPATIENT
Start: 2025-05-12 | End: 2025-05-12

## 2025-05-12 RX ORDER — SODIUM CHLORIDE 9 MG/ML
INJECTION, SOLUTION INTRAVENOUS CONTINUOUS
Status: DISCONTINUED | OUTPATIENT
Start: 2025-05-12 | End: 2025-05-12

## 2025-05-12 RX ORDER — HYDROCODONE BITARTRATE AND ACETAMINOPHEN 5; 325 MG/1; MG/1
1 TABLET ORAL EVERY 4 HOURS PRN
Status: DISCONTINUED | OUTPATIENT
Start: 2025-05-12 | End: 2025-05-12

## 2025-05-12 RX ORDER — BISACODYL 10 MG
10 SUPPOSITORY, RECTAL RECTAL
Status: DISCONTINUED | OUTPATIENT
Start: 2025-05-12 | End: 2025-05-12

## 2025-05-12 RX ORDER — SODIUM PHOSPHATE, DIBASIC AND SODIUM PHOSPHATE, MONOBASIC 7; 19 G/230ML; G/230ML
1 ENEMA RECTAL ONCE AS NEEDED
Status: DISCONTINUED | OUTPATIENT
Start: 2025-05-12 | End: 2025-05-12

## 2025-05-12 RX ORDER — MORPHINE SULFATE 4 MG/ML
4 INJECTION, SOLUTION INTRAMUSCULAR; INTRAVENOUS EVERY 2 HOUR PRN
Status: DISCONTINUED | OUTPATIENT
Start: 2025-05-12 | End: 2025-05-12

## 2025-05-12 RX ORDER — MORPHINE SULFATE 2 MG/ML
1 INJECTION, SOLUTION INTRAMUSCULAR; INTRAVENOUS EVERY 2 HOUR PRN
Status: DISCONTINUED | OUTPATIENT
Start: 2025-05-12 | End: 2025-05-12

## 2025-05-12 NOTE — PLAN OF CARE
Discharge instructions reviewed with patient. All questions answered. IV removed. Will be discharged to home. Pt set up personal transportation for home.

## 2025-05-12 NOTE — ED INITIAL ASSESSMENT (HPI)
Pt states he was lying on the couch watching TV and had sudden onset severe lower back pain, pt was recently admitted to hospital post discectomy last week, has been taking oxycodone for pain, did not take any today PTA

## 2025-05-12 NOTE — ED QUICK NOTES
Orders for admission, patient is aware of plan and ready to go upstairs. Any questions, please call ED RN Leonila at extension [phone].     Patient Covid vaccination status: Fully vaccinated     COVID Test Ordered in ED: None    COVID Suspicion at Admission: N/A    Running Infusions: Medication Infusions[1]     Mental Status/LOC at time of transport: a/ox4    Other pertinent information:   CIWA score: N/A   NIH score:  N/A             [1]    sodium chloride

## 2025-05-12 NOTE — ED PROVIDER NOTES
Patient Seen in: [facility] Emergency Department      History     Chief Complaint   Patient presents with    Abdomen/Flank Pain     Stated Complaint: LRQ pain    Subjective:   HPI    54-year-old male presenting to Emergency Department for right lower back pain.  Patient says pain came on acutely around 7:00 this evening no diarrhea no vomiting no cough cold fever chills no urinary complaints.  Patient's pain is sharp.  History of Present Illness               Objective:     No pertinent past medical history.            No pertinent past surgical history.              No pertinent social history.                              Physical Exam     ED Triage Vitals   BP 05/12/25 0245 (!) 139/109   Pulse 05/12/25 0233 74   Resp 05/12/25 0233 22   Temp --    Temp src --    SpO2 05/12/25 0233 100 %   O2 Device 05/12/25 0251 None (Room air)       Current Vitals:   Vital Signs  BP: (!) 139/109  Pulse: 74  Resp: 22  MAP (mmHg): (!) 116    Oxygen Therapy  SpO2: 100 %  O2 Device: None (Room air)        Physical Exam  Awake alert patient appears uncomfortable HEENT exam is normal lungs are clear cardiovascular exam regular rhythm abdomen soft nontender extremities no Cyanosis or edema no rash back exam is normal skin is nondiaphoretic  Physical Exam                ED Course     Labs Reviewed   COMP METABOLIC PANEL (14) - Abnormal; Notable for the following components:       Result Value    Sodium 135 (*)     Creatinine 1.33 (*)     All other components within normal limits   URINALYSIS WITH CULTURE REFLEX - Abnormal; Notable for the following components:    Blood Urine 2+ (*)     RBC Urine 3-5 (*)     Squamous Epi. Cells Few (*)     All other components within normal limits   CBC WITH DIFFERENTIAL WITH PLATELET   RAINBOW DRAW LAVENDER   RAINBOW DRAW LIGHT GREEN   RAINBOW DRAW BLUE       ED Course as of 05/12/25 0404  ------------------------------------------------------------  Time: 05/12 0258  Value: CBC With Differential  With Platelet  Comment: Unremarkable     Results            Differential diagnosis includes sepsis, appendicitis               MDM              Medical Decision Making  54-year-old male presenting with lower right sided back pain.  IV established cardiac monitor shows a sinus rhythm pulse ox shows no signs of hypoxia.  Laboratory tests are within acceptable is independent interpretation by ED physician CT scan shows a 3 mm stone passing with hydronephrosis.  Patient will be admitted for pain control and further evaluation.    Problems Addressed:  Kidney stone: acute illness or injury  Renal colic: acute illness or injury    Amount and/or Complexity of Data Reviewed  Labs: ordered. Decision-making details documented in ED Course.  Radiology: ordered and independent interpretation performed. Decision-making details documented in ED Course.  ECG/medicine tests: ordered and independent interpretation performed. Decision-making details documented in ED Course.        Disposition and Plan     Clinical Impression:  1. Kidney stone    2. Renal colic         Disposition:  There is no disposition on file for this visit.  There is no disposition time on file for this visit.    Follow-up:  No follow-up provider specified.        Medications Prescribed:  Current Discharge Medication List          Supplementary Documentation:
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
## 2025-05-12 NOTE — PLAN OF CARE
Pt A&Ox4. VSS on RA. . Telemetry monitoring. IV fluids infusing as ordered. Pain to RLQ managed with prn Toradol and morphine. Up ad eleuterio. Call light within reach. Will continue to monitor.

## 2025-05-12 NOTE — PLAN OF CARE
NURSING ADMISSION NOTE      Patient admitted via Wheelchair from ED.  Oriented to room.  Safety precautions initiated.  Bed in low position.  Call light in reach.  Patient denies any skin wounds. Skin assessed with Saiby, PCT, incisions x2 to left side of back covered with steri-strips, C/D/I.     Urology consult completed @0618.

## 2025-05-13 ENCOUNTER — PATIENT OUTREACH (OUTPATIENT)
Dept: CASE MANAGEMENT | Age: 55
End: 2025-05-13

## 2025-05-13 ENCOUNTER — OFFICE VISIT (OUTPATIENT)
Dept: SURGERY | Facility: CLINIC | Age: 55
End: 2025-05-13
Payer: COMMERCIAL

## 2025-05-13 VITALS
HEIGHT: 70 IN | BODY MASS INDEX: 22.9 KG/M2 | DIASTOLIC BLOOD PRESSURE: 70 MMHG | WEIGHT: 160 LBS | SYSTOLIC BLOOD PRESSURE: 110 MMHG | HEART RATE: 70 BPM

## 2025-05-13 DIAGNOSIS — Z98.890 S/P LUMBAR MICRODISCECTOMY: ICD-10-CM

## 2025-05-13 DIAGNOSIS — M51.16 LUMBAR DISC HERNIATION WITH RADICULOPATHY: ICD-10-CM

## 2025-05-13 DIAGNOSIS — M54.16 LUMBAR RADICULOPATHY: Primary | ICD-10-CM

## 2025-05-13 PROCEDURE — 3074F SYST BP LT 130 MM HG: CPT

## 2025-05-13 PROCEDURE — 3078F DIAST BP <80 MM HG: CPT

## 2025-05-13 PROCEDURE — 99024 POSTOP FOLLOW-UP VISIT: CPT

## 2025-05-13 PROCEDURE — 3008F BODY MASS INDEX DOCD: CPT

## 2025-05-13 RX ORDER — TRAZODONE HYDROCHLORIDE 50 MG/1
TABLET ORAL
COMMUNITY
Start: 2025-05-05

## 2025-05-13 NOTE — PROGRESS NOTES
Hospital follow up.    TCC request.  Patient does not wish to follow up with TCC or PCP.    Confirmed with patient.    Closing encounter.

## 2025-05-13 NOTE — PATIENT INSTRUCTIONS
Refill policies:    Allow 2-3 business days for refills; controlled substances may take longer.  Contact your pharmacy at least 5 days prior to running out of medication and have them send an electronic request or submit request through the “request refill” option in your "[employer] account.  Refills are not addressed on weekends; covering physicians do not authorize routine medications on weekends.  No narcotics or controlled substances are refilled after noon on Fridays or by on call physicians.  By law, narcotics must be electronically prescribed.  A 30 day supply with no refills is the maximum allowed.  If your prescription is due for a refill, you may be due for a follow up appointment.  To best provide you care, patients receiving routine medications need to be seen at least once a year.  Patients receiving narcotic/controlled substance medications need to be seen at least once every 3 months.  In the event that your preferred pharmacy does not have the requested medication in stock (e.g. Backordered), it is your responsibility to find another pharmacy that has the requested medication available.  We will gladly send a new prescription to that pharmacy at your request.    Scheduling Tests:    If your physician has ordered radiology tests such as MRI or CT scans, please contact Central Scheduling at [phone] right away to schedule the test.  Once scheduled, the [facility] Centralized Referral Team will work with your insurance carrier to obtain pre-certification or prior authorization.  Depending on your insurance carrier, approval may take 3-10 days.  It is highly recommended patients assure they have received an authorization before having a test performed.  If test is done without insurance authorization, patient may be responsible for the entire amount billed.      Precertification and Prior Authorizations:  If your physician has recommended that you have a procedure or additional testing performed the [facility]  Centralized Referral Team will contact your insurance carrier to obtain pre-certification or prior authorization.    You are strongly encouraged to contact your insurance carrier to verify that your procedure/test has been approved and is a COVERED benefit.  Although the [facility] Centralized Referral Team does its due diligence, the insurance carrier gives the disclaimer that \"Although the procedure is authorized, this does not guarantee payment.\"    Ultimately the patient is responsible for payment.   Thank you for your understanding in this matter.  Paperwork Completion:  If you require FMLA or disability paperwork for your recovery, please make sure to either drop it off or have it faxed to our office at [phone]. Be sure the form has your name and date of birth on it.  The form will be faxed to our Forms Department and they will complete it for you.  There is a 25$ fee for all forms that need to be filled out.  Please be aware there is a 10-14 day turnaround time.  You will need to sign a release of information (JOSE) form if your paperwork does not come with one.  You may call the Forms Department with any questions at [phone].  Their fax number is [phone].

## 2025-05-13 NOTE — PROGRESS NOTES
Pt has already been contacted  in another TST encounter. Pt declined to follow up     Closing encounter
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
## 2025-05-13 NOTE — PROGRESS NOTES
Established patient:  Reason for follow up: 3 week post op   Left lumbar 1-lumbar 2 far lateral minimally invasive microdiscectomy; left lumbar 3-lumbar 4 far lateral minimally invasive microdiscectomy Left       Numeric Rating Scale:         Pain at Present:  1/10       Distribution of Pain:    left    Most recent treatments for Current Pain Condition:   Surgery  Response to treatment: complete resolution

## 2025-05-14 NOTE — TELEPHONE ENCOUNTER
Dr. George/Cherelle,    Please sign off on form if you agree to:Disability from 4/14/25 to 5/18/25, Return to work 5/19/25 with restrictions per attached letter     -Signature page will be the first page scanned  -From your Inbasket, Highlight the patient and click Chart   -Double click the 5/1/25 Forms Completion telephone encounter  -Scroll down to the Media section   -Click the blue Hyperlink: Disability, Dr. George/Cherelle Beauchamp, 5/14/25  -Click Acknowledge located in the top right ribbon/menu   -Drag the mouse into the blank space of the document and a + sign will appear. Left click to   electronically sign the document.  -Once signed, simply exit out of the screen and you signature will be saved.     Thank you,  Loly PATRICIO

## 2025-05-23 LAB
CAOX MONOHYDRATE: 100 %
WEIGHT-STONE: 12 MG

## 2025-05-28 ENCOUNTER — TELEPHONE (OUTPATIENT)
Dept: SURGERY | Facility: CLINIC | Age: 55
End: 2025-05-28

## 2025-05-28 NOTE — TELEPHONE ENCOUNTER
Dr. George is no longer available 6/10/25. Attempted to reschedule patient a week prior or a week after for 6 week post op. No slots available. Please advise where to schedule patient.

## 2025-05-30 DIAGNOSIS — E78.00 HYPERCHOLESTEREMIA: Primary | ICD-10-CM

## 2025-05-30 RX ORDER — LOVASTATIN 40 MG/1
40 TABLET ORAL NIGHTLY
Qty: 90 TABLET | Refills: 0 | Status: SHIPPED | OUTPATIENT
Start: 2025-05-30 | End: 2025-08-28

## 2025-06-04 ENCOUNTER — TELEPHONE (OUTPATIENT)
Dept: SURGERY | Facility: CLINIC | Age: 55
End: 2025-06-04

## 2025-06-04 NOTE — TELEPHONE ENCOUNTER
Left voicemail to reschedule 6/10/25 appointment with Dr. George. The provider is no longer available this day.    Please assist if patient calls back. Please offer 6/13/25 in [place] for this pt

## 2025-06-04 NOTE — TELEPHONE ENCOUNTER
Message received from Provider.    \"Dr. George will be adding on clinic on Friday 6/13. Patient may follow-up with him on this date.\"    Noted message was routed to  to assist patient with scheduling.

## 2025-06-08 ENCOUNTER — HOSPITAL ENCOUNTER (EMERGENCY)
Facility: HOSPITAL | Age: 55
Discharge: HOME | End: 2025-06-08
Attending: EMERGENCY MEDICINE
Payer: COMMERCIAL

## 2025-06-08 ENCOUNTER — APPOINTMENT (OUTPATIENT)
Dept: CT IMAGING | Facility: HOSPITAL | Age: 55
End: 2025-06-08
Attending: EMERGENCY MEDICINE
Payer: COMMERCIAL

## 2025-06-08 VITALS
TEMPERATURE: 98 F | BODY MASS INDEX: 22.9 KG/M2 | HEIGHT: 70 IN | SYSTOLIC BLOOD PRESSURE: 148 MMHG | DIASTOLIC BLOOD PRESSURE: 78 MMHG | RESPIRATION RATE: 18 BRPM | HEART RATE: 70 BPM | WEIGHT: 160 LBS | OXYGEN SATURATION: 98 %

## 2025-06-08 DIAGNOSIS — R51.9 NONINTRACTABLE HEADACHE, UNSPECIFIED CHRONICITY PATTERN, UNSPECIFIED HEADACHE TYPE: Primary | ICD-10-CM

## 2025-06-08 LAB
ALBUMIN SERPL-MCNC: 4.7 G/DL (ref 3.2–4.8)
ALBUMIN/GLOB SERPL: 2 {RATIO} (ref 1–2)
ALP LIVER SERPL-CCNC: 59 U/L (ref 45–117)
ALT SERPL-CCNC: 17 U/L (ref 10–49)
ANION GAP SERPL CALC-SCNC: 11 MMOL/L (ref 0–18)
AST SERPL-CCNC: 23 U/L (ref ?–34)
BASOPHILS # BLD AUTO: 0.06 X10(3) UL (ref 0–0.2)
BASOPHILS NFR BLD AUTO: 0.8 %
BILIRUB SERPL-MCNC: 0.6 MG/DL (ref 0.3–1.2)
BUN BLD-MCNC: 13 MG/DL (ref 9–23)
CALCIUM BLD-MCNC: 9.2 MG/DL (ref 8.7–10.6)
CHLORIDE SERPL-SCNC: 105 MMOL/L (ref 98–112)
CO2 SERPL-SCNC: 24 MMOL/L (ref 21–32)
CREAT BLD-MCNC: 1.02 MG/DL (ref 0.7–1.3)
EGFRCR SERPLBLD CKD-EPI 2021: 88 ML/MIN/1.73M2 (ref 60–?)
EOSINOPHIL # BLD AUTO: 0.12 X10(3) UL (ref 0–0.7)
EOSINOPHIL NFR BLD AUTO: 1.6 %
ERYTHROCYTE [DISTWIDTH] IN BLOOD BY AUTOMATED COUNT: 13.5 %
GLOBULIN PLAS-MCNC: 2.3 G/DL (ref 2–3.5)
GLUCOSE BLD-MCNC: 97 MG/DL (ref 70–99)
HCT VFR BLD AUTO: 42.9 % (ref 39–53)
HGB BLD-MCNC: 14.9 G/DL (ref 13–17.5)
IMM GRANULOCYTES # BLD AUTO: 0.05 X10(3) UL (ref 0–1)
IMM GRANULOCYTES NFR BLD: 0.7 %
LYMPHOCYTES # BLD AUTO: 2.52 X10(3) UL (ref 1–4)
LYMPHOCYTES NFR BLD AUTO: 33.8 %
MCH RBC QN AUTO: 30.3 PG (ref 26–34)
MCHC RBC AUTO-ENTMCNC: 34.7 G/DL (ref 31–37)
MCV RBC AUTO: 87.4 FL (ref 80–100)
MONOCYTES # BLD AUTO: 0.57 X10(3) UL (ref 0.1–1)
MONOCYTES NFR BLD AUTO: 7.7 %
NEUTROPHILS # BLD AUTO: 4.13 X10 (3) UL (ref 1.5–7.7)
NEUTROPHILS # BLD AUTO: 4.13 X10(3) UL (ref 1.5–7.7)
NEUTROPHILS NFR BLD AUTO: 55.4 %
OSMOLALITY SERPL CALC.SUM OF ELEC: 290 MOSM/KG (ref 275–295)
PLATELET # BLD AUTO: 379 10(3)UL (ref 150–450)
POTASSIUM SERPL-SCNC: 3.5 MMOL/L (ref 3.5–5.1)
PROT SERPL-MCNC: 7 G/DL (ref 5.7–8.2)
RBC # BLD AUTO: 4.91 X10(6)UL (ref 4.3–5.7)
SODIUM SERPL-SCNC: 140 MMOL/L (ref 136–145)
WBC # BLD AUTO: 7.5 X10(3) UL (ref 4–11)

## 2025-06-08 PROCEDURE — 70450 CT HEAD/BRAIN W/O DYE: CPT | Performed by: EMERGENCY MEDICINE

## 2025-06-08 PROCEDURE — 99284 EMERGENCY DEPT VISIT MOD MDM: CPT

## 2025-06-08 PROCEDURE — 80053 COMPREHEN METABOLIC PANEL: CPT | Performed by: EMERGENCY MEDICINE

## 2025-06-08 PROCEDURE — 96374 THER/PROPH/DIAG INJ IV PUSH: CPT

## 2025-06-08 PROCEDURE — 96361 HYDRATE IV INFUSION ADD-ON: CPT

## 2025-06-08 PROCEDURE — 85025 COMPLETE CBC W/AUTO DIFF WBC: CPT | Performed by: EMERGENCY MEDICINE

## 2025-06-08 PROCEDURE — 96375 TX/PRO/DX INJ NEW DRUG ADDON: CPT

## 2025-06-08 RX ORDER — DIPHENHYDRAMINE HYDROCHLORIDE 50 MG/ML
25 INJECTION, SOLUTION INTRAMUSCULAR; INTRAVENOUS ONCE
Status: COMPLETED | OUTPATIENT
Start: 2025-06-08 | End: 2025-06-08

## 2025-06-08 RX ORDER — METOCLOPRAMIDE HYDROCHLORIDE 5 MG/ML
5 INJECTION INTRAMUSCULAR; INTRAVENOUS ONCE
Status: COMPLETED | OUTPATIENT
Start: 2025-06-08 | End: 2025-06-08

## 2025-06-08 NOTE — DISCHARGE INSTRUCTIONS
Follow-up with a neurologist in the next 1 to 2 weeks for further evaluation of your headaches.  Follow-up with your primary care doctor within the next week for reassessment.  Return for new or worsening pain or other concerning symptoms.
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
## 2025-06-11 ENCOUNTER — TELEPHONE (OUTPATIENT)
Dept: SURGERY | Facility: CLINIC | Age: 55
End: 2025-06-11

## 2025-06-11 NOTE — TELEPHONE ENCOUNTER
Family Medical Leave Act form, Disability ,and Fitness for Duty form received and logged for processing. Valid Authorization (Albaro Norris) received

## 2025-06-12 NOTE — TELEPHONE ENCOUNTER
Called and Left message for patient to call and provide details. Please get details when patient calls.

## 2025-06-12 NOTE — TELEPHONE ENCOUNTER
Patient called to provide details.    Type of Leave: Family Medical Leave Act/ short term disability   Reason for Leave: Back surgery   Start date of leave: 04/14/25   End date of leave: 05/19/25   How many flare ups per month/length?:  How many appts per month/length?:   Was Fee and Turnaround info Given?: Yes

## 2025-06-17 NOTE — TELEPHONE ENCOUNTER
Family Medical Leave Act, disability and Fit for Duty forms completed and signed by provider.    Family Medical Leave Act and disability forms faxed to [facility] ([phone])  confirmation received.

## 2025-06-17 NOTE — TELEPHONE ENCOUNTER
3 FORMS NEED SIGNATURE!!    Dr. George / Cherelle    Please sign off on form if you agree to: Family Medical Leave Act, Disability AND Fit for Duty for Lumbar radiculopathy 4/14/25 - 5/19/25     -Signature page will be the first page scanned  -From your Inbasket, Highlight the patient and click Chart   -Double click the 6/11/25 Forms Completion telephone encounter  -Scroll down to the Media section   -Click the blue Hyperlink: Family Medical Leave Act   PERRY Mondragon, PA  6/17/25  Disability  PERRY Mondragon, PA 6/17/25  Fit for Duty  PERRY Mondragon PA 6/17/25  -Click Acknowledge located in the top right ribbon/menu   -Drag the mouse into the blank space of the document and a + sign will appear. Left click to   electronically sign the document.  -Once signed, simply exit out of the screen and you signature will be saved.     Thank you,    Cuauhtemoc DUNBAR

## 2025-06-23 DIAGNOSIS — G43.109 MIGRAINE WITH AURA AND WITHOUT STATUS MIGRAINOSUS, NOT INTRACTABLE: ICD-10-CM

## 2025-06-24 RX ORDER — ELETRIPTAN HYDROBROMIDE 40 MG/1
TABLET, FILM COATED ORAL
Qty: 18 TABLET | Refills: 0 | Status: SHIPPED | OUTPATIENT
Start: 2025-06-24

## 2025-06-24 NOTE — TELEPHONE ENCOUNTER
Last Office Visit: 04/22/2025    Last Refill:   Medication Quantity Refills Start End   Eletriptan Hydrobromide 40 MG Oral Tab 18 tablet 0 7/10/2024 --     Return to Clinic: n/a    Protocol:

## 2025-07-08 ENCOUNTER — OFFICE VISIT (OUTPATIENT)
Dept: SURGERY | Facility: CLINIC | Age: 55
End: 2025-07-08
Payer: COMMERCIAL

## 2025-07-08 VITALS
WEIGHT: 160 LBS | BODY MASS INDEX: 22.9 KG/M2 | DIASTOLIC BLOOD PRESSURE: 64 MMHG | OXYGEN SATURATION: 98 % | HEART RATE: 81 BPM | HEIGHT: 70 IN | SYSTOLIC BLOOD PRESSURE: 112 MMHG

## 2025-07-08 DIAGNOSIS — M51.16 LUMBAR DISC HERNIATION WITH RADICULOPATHY: Primary | ICD-10-CM

## 2025-07-08 PROCEDURE — 3078F DIAST BP <80 MM HG: CPT | Performed by: NEUROLOGICAL SURGERY

## 2025-07-08 PROCEDURE — 3008F BODY MASS INDEX DOCD: CPT | Performed by: NEUROLOGICAL SURGERY

## 2025-07-08 PROCEDURE — 99024 POSTOP FOLLOW-UP VISIT: CPT | Performed by: NEUROLOGICAL SURGERY

## 2025-07-08 PROCEDURE — 3074F SYST BP LT 130 MM HG: CPT | Performed by: NEUROLOGICAL SURGERY

## 2025-07-08 RX ORDER — ARIPIPRAZOLE 2 MG/1
TABLET ORAL
COMMUNITY
Start: 2025-06-19

## 2025-07-08 NOTE — PROGRESS NOTES
Established patient:  Reason for follow up:   Sx 04/28/25  Pt is doing well, denies any new pain or symptoms.    Numeric Rating Scale:      Pain at Present: 0/10            The following individual(s) verbally consented to be recorded using ambient AI listening technology and understand that they can each withdraw their consent to this listening technology at any point by asking the clinician to turn off or pause the recording:    Patient name: Shin Liang

## 2025-07-08 NOTE — PROGRESS NOTES
Neurosurgery Clinic Visit  2025    Shin Liang PCP:  Hemal Martines MD    1970 MRN [id number]     HISTORY OF PRESENT ILLNESS:  Shin Liang is a(n) 54 year old male who presents today in postoperative follow-up.  On , I performed a two-level far lateral minimally invasive discectomy.    Overall, the patient is making fantastic progress.  He reports that he has been pain-free since about a week postoperatively.  He recently drove to [place], and he had no pain.      PHYSICAL EXAMINATION:  Vital Signs:  /64   Pulse 81   Ht 70\"   Wt 160 lb (72.6 kg)   SpO2 98%   BMI 22.96 kg/m²   On examination, strength is 5/5.  The incisions are well-healed.      REVIEW OF STUDIES:    No new imaging      ASSESSMENT and PLAN:  1.  Multilevel far lateral disc herniations, now status post multilevel minimally invasive decompression.  The patient is making outstanding progress.  Activity recommendations were reviewed.  I will see him back on an as-needed basis.        Time spent on counseling/coordination of care:  15 Minutes    Total time spent with patient:  15 Minutes        2025  5:02 PM     This report was dictated using voice recognition technology.  Errors in syntax or recognition may occur, and should not be construed to change the meaning of a sentence.

## 2025-07-08 NOTE — PATIENT INSTRUCTIONS
Refill policies:    Allow 2-3 business days for refills; controlled substances may take longer.  Contact your pharmacy at least 5 days prior to running out of medication and have them send an electronic request or submit request through the “request refill” option in your [employer] account.  Refills are not addressed on weekends; covering physicians do not authorize routine medications on weekends.  No narcotics or controlled substances are refilled after noon on Fridays or by on call physicians.  By law, narcotics must be electronically prescribed.  A 30 day supply with no refills is the maximum allowed.  If your prescription is due for a refill, you may be due for a follow up appointment.  To best provide you care, patients receiving routine medications need to be seen at least once a year.  Patients receiving narcotic/controlled substance medications need to be seen at least once every 3 months.  In the event that your preferred pharmacy does not have the requested medication in stock (e.g. Backordered), it is your responsibility to find another pharmacy that has the requested medication available.  We will gladly send a new prescription to that pharmacy at your request.    Scheduling Tests:    If your physician has ordered radiology tests such as MRI or CT scans, please contact Central Scheduling at [phone] right away to schedule the test.  Once scheduled, the [facility] Centralized Referral Team will work with your insurance carrier to obtain pre-certification or prior authorization.  Depending on your insurance carrier, approval may take 3-10 days.  It is highly recommended patients assure they have received an authorization before having a test performed.  If test is done without insurance authorization, patient may be responsible for the entire amount billed.      Precertification and Prior Authorizations:  If your physician has recommended that you have a procedure or additional testing performed the [facility]  Centralized Referral Team will contact your insurance carrier to obtain pre-certification or prior authorization.    You are strongly encouraged to contact your insurance carrier to verify that your procedure/test has been approved and is a COVERED benefit.  Although the [facility] Centralized Referral Team does its due diligence, the insurance carrier gives the disclaimer that \"Although the procedure is authorized, this does not guarantee payment.\"    Ultimately the patient is responsible for payment.   Thank you for your understanding in this matter.  Paperwork Completion:  If you require FMLA or disability paperwork for your recovery, please make sure to either drop it off or have it faxed to our office at [phone]. Be sure the form has your name and date of birth on it.  The form will be faxed to our Forms Department and they will complete it for you.  There is a 25$ fee for all forms that need to be filled out.  Please be aware there is a 10-14 day turnaround time.  You will need to sign a release of information (JOSE) form if your paperwork does not come with one.  You may call the Forms Department with any questions at [phone].  Their fax number is [phone].

## 2025-07-10 ENCOUNTER — TELEPHONE (OUTPATIENT)
Dept: SURGERY | Facility: CLINIC | Age: 55
End: 2025-07-10

## 2025-07-10 NOTE — TELEPHONE ENCOUNTER
Left voicemail to reschedule 7/22/25 appointment with Cherelle Beauchamp. The provider is no longer available this day.    Please assist if patient calls back.

## (undated) DEVICE — SPONGE,NEURO,0.5"X1.5",XR,STRL,LF,10/PK: Brand: MEDLINE

## (undated) DEVICE — Device

## (undated) DEVICE — PAIN TRAY: Brand: MEDLINE INDUSTRIES, INC.

## (undated) DEVICE — 4-PORT MANIFOLD: Brand: NEPTUNE 2

## (undated) DEVICE — NEEDLE SPINAL 22X5 405148

## (undated) DEVICE — ZEISS MD DRAPE

## (undated) DEVICE — GLOVE SURG SENSICARE SZ 6-1/2

## (undated) DEVICE — GAMMEX® PI HYBRID SIZE 8.5, STERILE POWDER-FREE SURGICAL GLOVE, POLYISOPRENE AND NEOPRENE BLEND: Brand: GAMMEX

## (undated) DEVICE — CODMAN® SURGICAL PATTIES 1/2" X 1/2" (1.27CM X 1.27CM): Brand: CODMAN®

## (undated) DEVICE — GLOVE SUR 6.5 SENSICARE PIP WHT PWD F

## (undated) DEVICE — #15 STERILE STAINLESS BLADE: Brand: STERILE STAINLESS BLADES

## (undated) DEVICE — 7.5MM ACORN

## (undated) DEVICE — 3.0MM PRECISION NEURO (MATCH HEAD)

## (undated) DEVICE — 3M™ STERI-DRAPE™ U-DRAPE 1015: Brand: STERI-DRAPE™

## (undated) DEVICE — PEN: MARKING STD PT 100/CS: Brand: MEDICAL ACTION INDUSTRIES

## (undated) DEVICE — VIOLET BRAIDED (POLYGLACTIN 910), SYNTHETIC ABSORBABLE SUTURE: Brand: COATED VICRYL

## (undated) DEVICE — LAPAROTOMY SPONGE - RF AND X-RAY DETECTABLE PRE-WASHED: Brand: SITUATE

## (undated) DEVICE — NON-ADHERENT DRESSING: Brand: TELFA

## (undated) DEVICE — SOLUTION SURG DURA PREP HAZMAT

## (undated) DEVICE — INSULATED BLADE ELECTRODE: Brand: EDGE

## (undated) DEVICE — UNDYED BRAIDED (POLYGLACTIN 910), SYNTHETIC ABSORBABLE SUTURE: Brand: COATED VICRYL

## (undated) DEVICE — 3M™ TEGADERM™ TRANSPARENT FILM DRESSING, 1626W, 4 IN X 4-3/4 IN (10 CM X 12 CM), 50 EACH/CARTON, 4 CARTON/CASE: Brand: 3M™ TEGADERM™

## (undated) DEVICE — MICRO KOVER: Brand: UNBRANDED

## (undated) DEVICE — BLADE ELECTRODE: Brand: EDGE

## (undated) DEVICE — SYRINGE,EAR/ULCER, 2 OZ, STERILE: Brand: MEDLINE

## (undated) DEVICE — C-ARMOR C-ARM EQUIPMENT COVERS CLEAR STERILE UNIVERSAL FIT 12 PER CASE: Brand: C-ARMOR

## (undated) DEVICE — DERMABOND LIQUID ADHESIVE

## (undated) DEVICE — DRAPE MICROSCOPE NEURO PENTERO

## (undated) DEVICE — GLOVE SUR 8 SENSICARE PI PIP CRM PWD F

## (undated) DEVICE — TRANSPOSAL ULTRAFLEX DUO/QUAD ULTRA CART MANIFOLD

## (undated) DEVICE — INSULATED BLADE ELECTRODE 6.5

## (undated) DEVICE — INSERT CUSH LG FOR PRONEVIEW HLMT SYS

## (undated) DEVICE — BANDAGE ADH 1INX3IN NAT FAB N ADH PD CURAD

## (undated) DEVICE — KENDALL SCD EXPRESS SLEEVES, KNEE LENGTH, MEDIUM: Brand: KENDALL SCD

## (undated) DEVICE — REMOVER LOT 4OZ N IRRIG UNSCNT SFT MOIST LIQ

## (undated) DEVICE — GLOVE SURG SENSICARE SZ 7

## (undated) DEVICE — BANDAID COVERLET 1X3

## (undated) DEVICE — SYRINGE MED 10ML LL TIP W/O SFTY DISP

## (undated) DEVICE — LAMINECTOMY: Brand: MEDLINE INDUSTRIES, INC.

## (undated) DEVICE — SUTURE MONOCRYL 3-0 PS-2

## (undated) DEVICE — DRILL SRG OIL CRTDG MAESTRO

## (undated) DEVICE — GLOVE SUR 8 BIOGEL PI ULTRATOUCH S PIP BLU

## (undated) DEVICE — MARKER SKIN 2 TIP

## (undated) DEVICE — SUTURE VICRYL 2-0 CP-2

## (undated) DEVICE — DRAPE C-ARM UNIVERSAL

## (undated) DEVICE — NEEDLE SPNL 22GA L3.5IN BLK QNCKE STYL DISP

## (undated) DEVICE — WRAP COOLING BACK W/NO PILLOW

## (undated) DEVICE — GAUZE SPONGES,12 PLY: Brand: CURITY

## (undated) DEVICE — C-ARM: Brand: UNBRANDED

## (undated) DEVICE — SUTURE VICRYL 2-0 CT-2

## (undated) DEVICE — GAUZE,SPONGE,USP,4"X4",12PLY,STRL,10/PK: Brand: MEDLINE INDUSTRIES, INC.

## (undated) DEVICE — CATHETER IV 14GA L5.25IN ANGIOCATH STR FEP

## (undated) DEVICE — SUT COAT VCRL+ 0 27IN UR-6 ABSRB VLT ANTIBACT

## (undated) DEVICE — STERILE POLYISOPRENE POWDER-FREE SURGICAL GLOVES: Brand: PROTEXIS

## (undated) DEVICE — GLOVE SURG SENSICARE SZ 7-1/2

## (undated) DEVICE — SOLUTION IRRIG 1000ML 0.9% NACL USP BTL

## (undated) DEVICE — INSULATED BLADE ELECTRODE;CAUTION: FOR MANUFACTURING, PROCESSING, OR REPACKING.: Brand: EDGE

## (undated) DEVICE — AVANOS* TUOHY EPIDURAL NEEDLE: Brand: AVANOS

## (undated) DEVICE — GLOVE SUR 7.5 SENSICARE PI PIP CRM PWD F

## (undated) DEVICE — SUTURE MONOCRYL 3-0 Y497G

## (undated) DEVICE — 3M™ IOBAN™ 2 ANTIMICROBIAL INCISE DRAPE 6650EZ: Brand: IOBAN™ 2

## (undated) DEVICE — ANTISEPTIC 4OZ 70% ISO ALC

## (undated) DEVICE — SOL  .9 1000ML BTL

## (undated) DEVICE — LIGHT HANDLE

## (undated) DEVICE — LAMINECTOMY CDS: Brand: MEDLINE INDUSTRIES, INC.

## (undated) DEVICE — MARKER SKIN PREP-RESISTANT ST: Brand: MEDLINE INDUSTRIES, INC.

## (undated) DEVICE — SLEEVE COMPR MD KNEE LEN SGL USE KENDALL SCD

## (undated) DEVICE — SOLUTION DURAPREP 26ML APPLICATOR

## (undated) DEVICE — SUTURE VICRYL 0 CT-2

## (undated) DEVICE — REMOVER DURAPREP 3M

## (undated) DEVICE — 3M™ MEDIPORE™ SOFT CLOTH TAPE, 4 INCH X 10 YARDS, 12 ROLLS/CASE, 2964: Brand: 3M™ MEDIPORE™

## (undated) DEVICE — KIT PATIENT CARE SPINAL TABLE

## (undated) DEVICE — WRAP THRP PLRCR500 BCK DRSG

## (undated) DEVICE — COVER,LIGHT,CAMERA,HARD,1/PK,STRL: Brand: MEDLINE

## (undated) DEVICE — KIT ACCESS MAXCESS 4

## (undated) DEVICE — ADHESIVE LIQ 2/3ML VI MASTISOL

## (undated) DEVICE — ALCOHOL 70% 4 OZ

## (undated) DEVICE — DRAPE SHEET LG

## (undated) DEVICE — FLOSEAL HEMOSTATIC MATRIX, 5ML: Brand: FLOSEAL HEMOSTATIC MATRIX

## (undated) DEVICE — 3.0MM NEURO (MATCH HEAD) SOFT TOUCH

## (undated) DEVICE — GLOVE SUR 7.5 SENSICARE PIP WHT PWD F

## (undated) DEVICE — Device: Brand: SMARTDRAPE

## (undated) DEVICE — NEPTUNE E-SEP SMOKE EVACUATION PENCIL, COATED, 70MM BLADE, PUSH BUTTON SWITCH: Brand: NEPTUNE E-SEP

## (undated) NOTE — LETTER
Date: 5/13/2025    Patient Name: Shin Liang          To Whom it may concern:    The above patient was seen at [facility] for treatment of a medical condition.  He recently underwent spine surgery.    The patient may return to work on 5/19/2025 with observation of the following restrictions: No lifting greater than 15 pounds, accommodations to sit or stand as needed, frequent breaks up to 10 minutes per hour as needed for pain relief and comfort.    If this office may be of further assistance, please do not hesitate to reach out.      Sincerely,          Cherelle Beauchamp M.S., ACROLE  [address], [address]  [place] [address]  [phone]  5/13/2025 9:50 AM

## (undated) NOTE — Clinical Note
[address]  [address]  Authorization for Surgical Operation and Procedure     Date:___________                                                                                                         Time:__________  1. I hereby authorize Surgeon(s):  Diogenes George MD, my physician and his/her assistants (if applicable), which may include medical students, residents, and/or fellows, to perform the following surgical operation/ procedure and administer such anesthesia as may be determined necessary by my physician:  Operation/Procedure name (s) Procedure(s):  Left lumbar 1-lumbar 2 far lateral minimally invasive microdiscectomy; left lumbar 3-lumbar 4 far lateral minimally invasive microdiscectomy on Shin Liang   2.   I recognize that during the surgical operation/procedure, unforeseen conditions may necessitate additional or different procedures than those listed above.  I, therefore, further authorize and request that the above-named surgeon, assistants, or designees perform such procedures as are, in their judgment, necessary and desirable.    3.   My surgeon/physician has discussed prior to my surgery the potential benefits, risks and side effects of this procedure; the likelihood of achieving goals; and potential problems that might occur during recuperation.  They also discussed reasonable alternatives to the procedure, including risks, benefits, and side effects related to the alternatives and risks related to not receiving this procedure.  I have had all my questions answered and I acknowledge that no guarantee has been made as to the result that may be obtained.    4.   Should the need arise during my operation/procedure, which includes change of level of care prior to discharge, I also consent to the administration of blood and/or blood products.  Further, I understand that despite careful testing and screening of blood or blood products by  collecting agencies, I may still be subject to ill effects as a result of receiving a blood transfusion and/or blood products.  The following are some, but not all, of the potential risks that can occur: fever and allergic reactions, hemolytic reactions, transmission of diseases such as Hepatitis, AIDS and Cytomegalovirus (CMV) and fluid overload.  In the event that I wish to have an autologous transfusion of my own blood, or a directed donor transfusion, I will discuss this with my physician.  Check only if Refusing Blood or Blood Products  I understand refusal of blood or blood products as deemed necessary by my physician may have serious consequences to my condition to include possible death. I hereby assume responsibility for my refusal and release the hospital, its personnel, and my physicians from any responsibility for the consequences of my refusal.          o  Refuse      5.   I authorize the use of any specimen, organs, tissues, body parts or foreign objects that may be removed from my body during the operation/procedure for diagnosis, research or teaching purposes and their subsequent disposal by hospital authorities.  I also authorize the release of specimen test results and/or written reports to my treating physician on the hospital medical staff or other referring or consulting physicians involved in my care, at the discretion of the Pathologist or my treating physician.    6.   I consent to the photographing or videotaping of the operations or procedures to be performed, including appropriate portions of my body for medical, scientific, or educational purposes, provided my identity is not revealed by the pictures or by descriptive texts accompanying them.  If the procedure has been photographed/videotaped, the surgeon will obtain the original picture, image, videotape or CD.  The hospital will not be responsible for storage, release or maintenance of the picture, image, tape or CD.    7.   I consent  to the presence of a  or observers in the operating room as deemed necessary by my physician or their designees.    8.   I recognize that in the event my procedure results in extended X-Ray/fluoroscopy time, I may develop a skin reaction.    9. If I have a Do Not Attempt Resuscitation (DNAR) order in place, that status will be suspended while in the operating room, procedural suite, and during the recovery period unless otherwise explicitly stated by me (or a person authorized to consent on my behalf). The surgeon or my attending physician will determine when the applicable recovery period ends for purposes of reinstating the DNAR order.  10. Patients having a sterilization procedure: I understand that if the procedure is successful the results will be permanent and it will therefore be impossible for me to inseminate, conceive, or bear children.  I also understand that the procedure is intended to result in sterility, although the result has not been guaranteed.   11. I acknowledge that my physician has explained sedation/analgesia administration to me including the risk and benefits I consent to the administration of sedation/analgesia as may be necessary or desirable in the judgment of my physician.    I CERTIFY THAT I HAVE READ AND FULLY UNDERSTAND THE ABOVE CONSENT TO OPERATION and/or OTHER PROCEDURE.    _________________________________________  __________________________________  Signature of Patient     Signature of Responsible Person         ___________________________________         Printed Name of Responsible Person           _________________________________                 Relationship to Patient  _________________________________________  ______________________________  Signature of Witness          Date  Time      Patient Name: Shin Oglesby Liang     : 1970                 Printed: 2025     Medical Record #: [id number]                     Page 1 of 2                                   [address]  [address]    Consent for Anesthesia    1. I, Shin Liang agree to be cared for by an anesthesiologist, who is specially trained to monitor me and give me medicine to put me to sleep or keep me comfortable during my procedure    I understand that my anesthesiologist is not an employee or agent of [facility] or [employer]. He or she works for [employer].    2. As the patient asking for anesthesia services, I agree to:  a. Allow the anesthesiologist (anesthesia doctor) to give me medicine and do additional procedures as necessary. Some examples are: Starting or using an “IV” to give me medicine, fluids or blood during my procedure, and having a breathing tube placed to help me breathe when I’m asleep (intubation). In the event that my heart stops working properly, I understand that my anesthesiologist will make every effort to sustain my life, unless otherwise directed by [facility] Do Not Resuscitate documents.  b. Tell my anesthesia doctor before my procedure:  i. If I am pregnant.  ii. The last time that I ate or drank.  iii. All of the medicines I take (including prescriptions, herbal supplements, and pills I can buy without a prescription (including street drugs/illegal medications). Failure to inform my anesthesiologist about these medicines may increase my risk of anesthetic complications.  iv. If I am allergic to anything or have had a reaction to anesthesia before.  3. I understand how the anesthesia medicine will help me (benefits).  4. I understand that with any type of anesthesia medicine there are risks:  a. The most common risks are: nausea, vomiting, sore throat, muscle soreness, damage to my eyes, mouth, or teeth (from breathing tube placement).  b. Rare risks include: remembering what happened during my procedure, allergic reactions to medications, injury to my airway, heart, lungs,  vision, nerves, or muscles and in extremely rare instances death.  5. My doctor has explained to me other choices available to me for my care (alternatives).  6. Pregnant Patients (“epidural”):  I understand that the risks of having an epidural (medicine given into my back to help control pain during labor), include itching, low blood pressure, difficulty urinating, headache or slowing of the baby’s heart. Very rare risks include infection, bleeding, seizure, irregular heart rhythms and nerve injury.  7. Regional Anesthesia (“spinal”, “epidural”, & “nerve blocks”):  I understand that rare but potential complications include headache, bleeding, infection, seizure, irregular heart rhythms, and nerve injury.    I can change my mind about having anesthesia services at any time before I get the medicine.    _____________________________________________________________________________  Patient (or Representative) Signature/Relationship to Patient  Date   Time    _____________________________________________________________________________   Name (if used)    Language/Organization   Time    _____________________________________________________________________________  Anesthesiologist Signature     Date   Time  I have discussed the procedure and information above with the patient (or patient’s representative) and answered their questions. The patient or their representative has agreed to have anesthesia services.    _____________________________________________________________________________  Witness        Date   Time  I have verified that the signature is that of the patient or patient’s representative, and that it was signed before the procedure  Patient Name: Shin Liang     : 1970                 Printed: 2025     Medical Record #: [id number]                     Page 2 of 2

## (undated) NOTE — ED AVS SNAPSHOT
Kodi Anderson   MRN: [id number]    Department:  [facility] Emergency Department   Date of Visit:  6/16/2019           Disclosure     Insurance plans vary and the physician(s) referred by the ER may not be covered by your plan.  Please contact your tell this physician (or your personal doctor if your instructions are to return to your personal doctor) about any new or lasting problems. The primary care or specialist physician will see patients referred from the [facility] Emergency Department.  Miller Santo

## (undated) NOTE — LETTER
[facility] Testing Department  Phone: [phone]  OUTSIDE TESTING RESULT REQUEST    TO:   Dr. Concha Frost Date: 8/27/20    FAX #: [phone]    Patient will be calling your office to make an appointment   and will need testing don

## (undated) NOTE — LETTER
[address]  [address]  Authorization for Surgical Operation and Procedure     Date:___________                                                                                                         Time:__________  I hereby authorize Surgeon(s):  Elroy Oconnor MD, my physician and his/her assistants (if applicable), which may include medical students, residents, and/or fellows, to perform the following surgical operation/ procedure and administer such anesthesia as may be determined necessary by my physician:  Operation/Procedure name (s) Procedure(s):  LEFT LUMBAR 1, LUMBAR 3 TRANSFORAMINAL EPIDURAL STEROID INJECTION MULTIPLE LEVEL WITH LOCAL on Shin Liang   2.   I recognize that during the surgical operation/procedure, unforeseen conditions may necessitate additional or different procedures than those listed above.  I, therefore, further authorize and request that the above-named surgeon, assistants, or designees perform such procedures as are, in their judgment, necessary and desirable.    3.   My surgeon/physician has discussed prior to my surgery the potential benefits, risks and side effects of this procedure; the likelihood of achieving goals; and potential problems that might occur during recuperation.  They also discussed reasonable alternatives to the procedure, including risks, benefits, and side effects related to the alternatives and risks related to not receiving this procedure.  I have had all my questions answered and I acknowledge that no guarantee has been made as to the result that may be obtained.    4.   Should the need arise during my operation/procedure, which includes change of level of care prior to discharge, I also consent to the administration of blood and/or blood products.  Further, I understand that despite careful testing and screening of blood or blood products by collecting agencies, I may still be subject to ill effects as a  result of receiving a blood transfusion and/or blood products.  The following are some, but not all, of the potential risks that can occur: fever and allergic reactions, hemolytic reactions, transmission of diseases such as Hepatitis, AIDS and Cytomegalovirus (CMV) and fluid overload.  In the event that I wish to have an autologous transfusion of my own blood, or a directed donor transfusion, I will discuss this with my physician.  Check only if Refusing Blood or Blood Products  I understand refusal of blood or blood products as deemed necessary by my physician may have serious consequences to my condition to include possible death. I hereby assume responsibility for my refusal and release the hospital, its personnel, and my physicians from any responsibility for the consequences of my refusal.          o  Refuse      5.   I authorize the use of any specimen, organs, tissues, body parts or foreign objects that may be removed from my body during the operation/procedure for diagnosis, research or teaching purposes and their subsequent disposal by hospital authorities.  I also authorize the release of specimen test results and/or written reports to my treating physician on the hospital medical staff or other referring or consulting physicians involved in my care, at the discretion of the Pathologist or my treating physician.    6.   I consent to the photographing or videotaping of the operations or procedures to be performed, including appropriate portions of my body for medical, scientific, or educational purposes, provided my identity is not revealed by the pictures or by descriptive texts accompanying them.  If the procedure has been photographed/videotaped, the surgeon will obtain the original picture, image, videotape or CD.  The hospital will not be responsible for storage, release or maintenance of the picture, image, tape or CD.    7.   I consent to the presence of a  or observers in the  operating room as deemed necessary by my physician or their designees.    8.   I recognize that in the event my procedure results in extended X-Ray/fluoroscopy time, I may develop a skin reaction.    9. If I have a Do Not Attempt Resuscitation (DNAR) order in place, that status will be suspended while in the operating room, procedural suite, and during the recovery period unless otherwise explicitly stated by me (or a person authorized to consent on my behalf). The surgeon or my attending physician will determine when the applicable recovery period ends for purposes of reinstating the DNAR order.  10. Patients having a sterilization procedure: I understand that if the procedure is successful the results will be permanent and it will therefore be impossible for me to inseminate, conceive, or bear children.  I also understand that the procedure is intended to result in sterility, although the result has not been guaranteed.   11. I acknowledge that my physician has explained sedation/analgesia administration to me including the risk and benefits I consent to the administration of sedation/analgesia as may be necessary or desirable in the judgment of my physician.    I CERTIFY THAT I HAVE READ AND FULLY UNDERSTAND THE ABOVE CONSENT TO OPERATION and/or OTHER PROCEDURE.    _________________________________________  __________________________________  Signature of Patient     Signature of Responsible Person         ___________________________________         Printed Name of Responsible Person           _________________________________                 Relationship to Patient  _________________________________________  ______________________________  Signature of Witness          Date  Time      Patient Name: Shin Liang     : 1970                 Printed: April 15, 2025     Medical Record #: [id number]                     Page 1 of 2                                    [address]  [place]  [address]    Consent for Anesthesia    I, Shin Liang agree to be cared for by an anesthesiologist, who is specially trained to monitor me and give me medicine to put me to sleep or keep me comfortable during my procedure    I understand that my anesthesiologist is not an employee or agent of [facility] or [employer]. He or she works for [employer].    As the patient asking for anesthesia services, I agree to:  Allow the anesthesiologist (anesthesia doctor) to give me medicine and do additional procedures as necessary. Some examples are: Starting or using an “IV” to give me medicine, fluids or blood during my procedure, and having a breathing tube placed to help me breathe when I’m asleep (intubation). In the event that my heart stops working properly, I understand that my anesthesiologist will make every effort to sustain my life, unless otherwise directed by [facility] Do Not Resuscitate documents.  Tell my anesthesia doctor before my procedure:  If I am pregnant.  The last time that I ate or drank.  All of the medicines I take (including prescriptions, herbal supplements, and pills I can buy without a prescription (including street drugs/illegal medications). Failure to inform my anesthesiologist about these medicines may increase my risk of anesthetic complications.  If I am allergic to anything or have had a reaction to anesthesia before.  I understand how the anesthesia medicine will help me (benefits).  I understand that with any type of anesthesia medicine there are risks:  The most common risks are: nausea, vomiting, sore throat, muscle soreness, damage to my eyes, mouth, or teeth (from breathing tube placement).  Rare risks include: remembering what happened during my procedure, allergic reactions to medications, injury to my airway, heart, lungs, vision, nerves, or muscles and in extremely rare instances death.  My doctor has explained to  me other choices available to me for my care (alternatives).  Pregnant Patients (“epidural”):  I understand that the risks of having an epidural (medicine given into my back to help control pain during labor), include itching, low blood pressure, difficulty urinating, headache or slowing of the baby’s heart. Very rare risks include infection, bleeding, seizure, irregular heart rhythms and nerve injury.  Regional Anesthesia (“spinal”, “epidural”, & “nerve blocks”):  I understand that rare but potential complications include headache, bleeding, infection, seizure, irregular heart rhythms, and nerve injury.    I can change my mind about having anesthesia services at any time before I get the medicine.    _____________________________________________________________________________  Patient (or Representative) Signature/Relationship to Patient  Date   Time    _____________________________________________________________________________   Name (if used)    Language/Organization   Time    _____________________________________________________________________________  Anesthesiologist Signature     Date   Time  I have discussed the procedure and information above with the patient (or patient’s representative) and answered their questions. The patient or their representative has agreed to have anesthesia services.    _____________________________________________________________________________  Witness        Date   Time  I have verified that the signature is that of the patient or patient’s representative, and that it was signed before the procedure  Patient Name: Shin Liang     : 1970                 Printed: April 15, 2025     Medical Record #: [id number]                     Page 2 of 2

## (undated) NOTE — LETTER
20    RE: Wesleycynthia Paz     : 1970    Dear Dr. Santi King and Mallory Gallegos,    This letter is to inform you that your patient is being scheduled for surgery with Dr. Brenda Nguyen on 2020 at [facility]. We have asked the patient t

## (undated) NOTE — LETTER
Lizzie Perez [address]  [place], [address] [facility]    Consent for Operation  Date: __________________                                Time: _______________    1.  I authorize the performance upon Louise Parent the following operation: L4 transf procedure has been videotaped, the surgeon will obtain the original videotape. The hospital will not be responsible for storage or maintenance of this tape.   6. For the purpose of advancing medical education, I consent to the admittance of observers to the STATEMENTS REQUIRING INSERTION OR COMPLETION WERE FILLED IN.     Signature of Patient:   ___________________________    When the patient is a minor or mentally incompetent to give consent:  Signature of person authorized to consent for patient: ____________ supplements, and pills I can buy without a prescription (including street drugs/illegal medications). Failure to inform my anesthesiologist about these medicines may increase my risk of anesthetic complications. iv.  If I am allergic to anything or have ha Anesthesiologist Signature     Date   Time  I have discussed the procedure and information above with the patient (or patient’s representative) and answered their questions. The patient or their representative has agreed to have anesthesia services.     ___

## (undated) NOTE — Clinical Note
Hi Dr. Martines, pt feeling better since discharge.  Has scheduled visit with you.  Thank you, Tatiana

## (undated) NOTE — LETTER
[address]  [address]  Authorization for Surgical Operation and Procedure     Date:___________                                                                                                         Time:__________  I hereby authorize Surgeon(s):  Diogenes George MD, my physician and his/her assistants (if applicable), which may include medical students, residents, and/or fellows, to perform the following surgical operation/ procedure and administer such anesthesia as may be determined necessary by my physician:  Operation/Procedure name (s) Procedure(s):  Left lumbar 1-lumbar 2 far lateral minimally invasive microdiscectomy; left lumbar 3-lumbar 4 far lateral minimally invasive microdiscectomy on Shin Liang   2.   I recognize that during the surgical operation/procedure, unforeseen conditions may necessitate additional or different procedures than those listed above.  I, therefore, further authorize and request that the above-named surgeon, assistants, or designees perform such procedures as are, in their judgment, necessary and desirable.    3.   My surgeon/physician has discussed prior to my surgery the potential benefits, risks and side effects of this procedure; the likelihood of achieving goals; and potential problems that might occur during recuperation.  They also discussed reasonable alternatives to the procedure, including risks, benefits, and side effects related to the alternatives and risks related to not receiving this procedure.  I have had all my questions answered and I acknowledge that no guarantee has been made as to the result that may be obtained.    4.   Should the need arise during my operation/procedure, which includes change of level of care prior to discharge, I also consent to the administration of blood and/or blood products.  Further, I understand that despite careful testing and screening of blood or blood products by collecting  agencies, I may still be subject to ill effects as a result of receiving a blood transfusion and/or blood products.  The following are some, but not all, of the potential risks that can occur: fever and allergic reactions, hemolytic reactions, transmission of diseases such as Hepatitis, AIDS and Cytomegalovirus (CMV) and fluid overload.  In the event that I wish to have an autologous transfusion of my own blood, or a directed donor transfusion, I will discuss this with my physician.  Check only if Refusing Blood or Blood Products  I understand refusal of blood or blood products as deemed necessary by my physician may have serious consequences to my condition to include possible death. I hereby assume responsibility for my refusal and release the hospital, its personnel, and my physicians from any responsibility for the consequences of my refusal.          o  Refuse      5.   I authorize the use of any specimen, organs, tissues, body parts or foreign objects that may be removed from my body during the operation/procedure for diagnosis, research or teaching purposes and their subsequent disposal by hospital authorities.  I also authorize the release of specimen test results and/or written reports to my treating physician on the hospital medical staff or other referring or consulting physicians involved in my care, at the discretion of the Pathologist or my treating physician.    6.   I consent to the photographing or videotaping of the operations or procedures to be performed, including appropriate portions of my body for medical, scientific, or educational purposes, provided my identity is not revealed by the pictures or by descriptive texts accompanying them.  If the procedure has been photographed/videotaped, the surgeon will obtain the original picture, image, videotape or CD.  The hospital will not be responsible for storage, release or maintenance of the picture, image, tape or CD.    7.   I consent to the  presence of a  or observers in the operating room as deemed necessary by my physician or their designees.    8.   I recognize that in the event my procedure results in extended X-Ray/fluoroscopy time, I may develop a skin reaction.    9. If I have a Do Not Attempt Resuscitation (DNAR) order in place, that status will be suspended while in the operating room, procedural suite, and during the recovery period unless otherwise explicitly stated by me (or a person authorized to consent on my behalf). The surgeon or my attending physician will determine when the applicable recovery period ends for purposes of reinstating the DNAR order.  10. Patients having a sterilization procedure: I understand that if the procedure is successful the results will be permanent and it will therefore be impossible for me to inseminate, conceive, or bear children.  I also understand that the procedure is intended to result in sterility, although the result has not been guaranteed.   11. I acknowledge that my physician has explained sedation/analgesia administration to me including the risk and benefits I consent to the administration of sedation/analgesia as may be necessary or desirable in the judgment of my physician.    I CERTIFY THAT I HAVE READ AND FULLY UNDERSTAND THE ABOVE CONSENT TO OPERATION and/or OTHER PROCEDURE.    _________________________________________  __________________________________  Signature of Patient     Signature of Responsible Person         ___________________________________         Printed Name of Responsible Person           _________________________________                 Relationship to Patient  _________________________________________  ______________________________  Signature of Witness          Date  Time      Patient Name: Shin Liang     : 1970                 Printed: 2025     Medical Record #: [id number]                     Page 1 of 2                                     [address]  [address]    Consent for Anesthesia    I, Shin Liang agree to be cared for by an anesthesiologist, who is specially trained to monitor me and give me medicine to put me to sleep or keep me comfortable during my procedure    I understand that my anesthesiologist is not an employee or agent of [facility] or [employer]. He or she works for [employer].    As the patient asking for anesthesia services, I agree to:  Allow the anesthesiologist (anesthesia doctor) to give me medicine and do additional procedures as necessary. Some examples are: Starting or using an “IV” to give me medicine, fluids or blood during my procedure, and having a breathing tube placed to help me breathe when I’m asleep (intubation). In the event that my heart stops working properly, I understand that my anesthesiologist will make every effort to sustain my life, unless otherwise directed by [facility] Do Not Resuscitate documents.  Tell my anesthesia doctor before my procedure:  If I am pregnant.  The last time that I ate or drank.  All of the medicines I take (including prescriptions, herbal supplements, and pills I can buy without a prescription (including street drugs/illegal medications). Failure to inform my anesthesiologist about these medicines may increase my risk of anesthetic complications.  If I am allergic to anything or have had a reaction to anesthesia before.  I understand how the anesthesia medicine will help me (benefits).  I understand that with any type of anesthesia medicine there are risks:  The most common risks are: nausea, vomiting, sore throat, muscle soreness, damage to my eyes, mouth, or teeth (from breathing tube placement).  Rare risks include: remembering what happened during my procedure, allergic reactions to medications, injury to my airway, heart, lungs, vision, nerves, or muscles and in extremely  rare instances death.  My doctor has explained to me other choices available to me for my care (alternatives).  Pregnant Patients (“epidural”):  I understand that the risks of having an epidural (medicine given into my back to help control pain during labor), include itching, low blood pressure, difficulty urinating, headache or slowing of the baby’s heart. Very rare risks include infection, bleeding, seizure, irregular heart rhythms and nerve injury.  Regional Anesthesia (“spinal”, “epidural”, & “nerve blocks”):  I understand that rare but potential complications include headache, bleeding, infection, seizure, irregular heart rhythms, and nerve injury.    I can change my mind about having anesthesia services at any time before I get the medicine.    _____________________________________________________________________________  Patient (or Representative) Signature/Relationship to Patient  Date   Time    _____________________________________________________________________________   Name (if used)    Language/Organization   Time    _____________________________________________________________________________  Anesthesiologist Signature     Date   Time  I have discussed the procedure and information above with the patient (or patient’s representative) and answered their questions. The patient or their representative has agreed to have anesthesia services.    _____________________________________________________________________________  Witness        Date   Time  I have verified that the signature is that of the patient or patient’s representative, and that it was signed before the procedure  Patient Name: Shin Liang     : 1970                 Printed: 2025     Medical Record #: [id number]                     Page 2 of 2